# Patient Record
Sex: FEMALE | Race: WHITE | Employment: FULL TIME | URBAN - METROPOLITAN AREA
[De-identification: names, ages, dates, MRNs, and addresses within clinical notes are randomized per-mention and may not be internally consistent; named-entity substitution may affect disease eponyms.]

---

## 2017-01-12 ENCOUNTER — TELEPHONE (OUTPATIENT)
Dept: INTERNAL MEDICINE CLINIC | Facility: CLINIC | Age: 32
End: 2017-01-12

## 2017-01-12 NOTE — TELEPHONE ENCOUNTER
Patient is to take contrave and belviq both. Pharmacy informed. PLAN:  1. Obesity, morbid, BMI 50 or higher (HCC)/KYLE/prediabetes, pt with 4 lbs wt loss on 1 month of phentermine 37.5mg, contrave, topamax and metformin. Total wt loss of 17 lbs.  Pt with O

## 2017-02-17 ENCOUNTER — OFFICE VISIT (OUTPATIENT)
Dept: FAMILY MEDICINE CLINIC | Facility: CLINIC | Age: 32
End: 2017-02-17

## 2017-02-17 VITALS
SYSTOLIC BLOOD PRESSURE: 128 MMHG | DIASTOLIC BLOOD PRESSURE: 70 MMHG | HEIGHT: 64 IN | RESPIRATION RATE: 20 BRPM | TEMPERATURE: 98 F | OXYGEN SATURATION: 97 % | WEIGHT: 290 LBS | BODY MASS INDEX: 49.51 KG/M2 | HEART RATE: 81 BPM

## 2017-02-17 DIAGNOSIS — J01.00 ACUTE MAXILLARY SINUSITIS, RECURRENCE NOT SPECIFIED: ICD-10-CM

## 2017-02-17 DIAGNOSIS — J02.9 SORE THROAT: Primary | ICD-10-CM

## 2017-02-17 LAB — CONTROL LINE PRESENT WITH A CLEAR BACKGROUND (YES/NO): YES YES/NO

## 2017-02-17 PROCEDURE — 99213 OFFICE O/P EST LOW 20 MIN: CPT | Performed by: NURSE PRACTITIONER

## 2017-02-17 PROCEDURE — 87880 STREP A ASSAY W/OPTIC: CPT | Performed by: NURSE PRACTITIONER

## 2017-02-17 RX ORDER — AZITHROMYCIN 250 MG/1
TABLET, FILM COATED ORAL
Qty: 6 TABLET | Refills: 0 | Status: SHIPPED | OUTPATIENT
Start: 2017-02-17 | End: 2017-03-16

## 2017-02-17 NOTE — PROGRESS NOTES
CHIEF COMPLAINT:   Patient presents with:  Sinus Problem: sore throat, pain in left ear, congestion x 6 dys       HPI:   Maddison Thomas is a 32year old female who presents for cold symptoms for  1  weeks.  Symptoms have progressed into sinus dannielle Lorcaserin HCl (BELVIQ) 10 MG Oral Tab Take 10 mg by mouth 2 (two) times daily. Disp: 60 tablet Rfl: 0   Cholecalciferol (VITAMIN D) 2000 UNITS Oral Cap Take  by mouth.  Disp:  Rfl:       Past Medical History   Diagnosis Date   • Depression    • Unspecified EXTREMITIES: no cyanosis, clubbing or edema  LYMPH:  No cervical lymphadenopathy. ASSESSMENT AND PLAN:   Kerry Hinojosa is a 32year old female who presents with Sinus Problem.  Symptoms are consistent with:      ASSESSMENT:  Sore throat  ( Your doctor may prescribe medications to help treat your sinusitis. If you have an infection, antibiotics can help clear it up. If you are prescribed antibiotics, take all pills on schedule until they are gone, even if you feel better.  Decongestants help r

## 2017-03-16 ENCOUNTER — OFFICE VISIT (OUTPATIENT)
Dept: INTERNAL MEDICINE CLINIC | Facility: CLINIC | Age: 32
End: 2017-03-16

## 2017-03-16 VITALS
DIASTOLIC BLOOD PRESSURE: 78 MMHG | SYSTOLIC BLOOD PRESSURE: 124 MMHG | WEIGHT: 293 LBS | HEIGHT: 64 IN | HEART RATE: 98 BPM | BODY MASS INDEX: 50.02 KG/M2 | RESPIRATION RATE: 16 BRPM

## 2017-03-16 DIAGNOSIS — Z13.21 ENCOUNTER FOR VITAMIN DEFICIENCY SCREENING: Primary | ICD-10-CM

## 2017-03-16 DIAGNOSIS — F41.9 ANXIETY: ICD-10-CM

## 2017-03-16 DIAGNOSIS — E66.01 OBESITY, MORBID, BMI 50 OR HIGHER (HCC): ICD-10-CM

## 2017-03-16 PROCEDURE — 99213 OFFICE O/P EST LOW 20 MIN: CPT | Performed by: INTERNAL MEDICINE

## 2017-03-16 RX ORDER — PHENTERMINE HYDROCHLORIDE 37.5 MG/1
37.5 TABLET ORAL
Qty: 30 TABLET | Refills: 0 | Status: SHIPPED | OUTPATIENT
Start: 2017-03-16 | End: 2017-05-23

## 2017-03-16 NOTE — PROGRESS NOTES
CC: Patient presents with:  Weight Check: up 11 lb       HPI:   Obesity, worsening wt gain, stopped all meds. Worsening hunger. Increase anxiety and stress.        Current Outpatient Prescriptions:  Phentermine HCl 37.5 MG Oral Tab Take 1 tablet (37.5 for lipoid disorders     Essential hypertension, benign     Smoking     Lung nodule     Adrenal tumor     Encounter for therapeutic drug monitoring     Prediabetes     Fatigue     KYLE (obstructive sleep apnea)     Constipation     Anxiety        REVIEW OF

## 2017-04-11 ENCOUNTER — OFFICE VISIT (OUTPATIENT)
Dept: FAMILY MEDICINE CLINIC | Facility: CLINIC | Age: 32
End: 2017-04-11

## 2017-04-11 VITALS
HEART RATE: 91 BPM | RESPIRATION RATE: 16 BRPM | SYSTOLIC BLOOD PRESSURE: 122 MMHG | DIASTOLIC BLOOD PRESSURE: 82 MMHG | TEMPERATURE: 99 F | OXYGEN SATURATION: 99 % | HEIGHT: 64 IN | WEIGHT: 293 LBS | BODY MASS INDEX: 50.02 KG/M2

## 2017-04-11 DIAGNOSIS — M26.609 TMJ (TEMPOROMANDIBULAR JOINT SYNDROME): Primary | ICD-10-CM

## 2017-04-11 PROCEDURE — 99213 OFFICE O/P EST LOW 20 MIN: CPT | Performed by: PHYSICIAN ASSISTANT

## 2017-04-11 NOTE — PROGRESS NOTES
CHIEF COMPLAINT:   Patient presents with:  Jaw Pain: Jaw pain sx x 2 weeks. HPI:   Jorge Berry is a 32year old female who presents to clinic today with complaints of pain over the left TMJ radiating to the left ear.  Has had for 2  wee topiramate 50 MG Oral Tab Take 1 tablet (50 mg total) by mouth 2 (two) times daily. Disp: 60 tablet Rfl: 5   Hydrocortisone Acetate 25 MG Rectal Suppos Place 1 suppository (25 mg total) rectally 2 (two) times daily.  Disp: 24 suppository Rfl: 1   MetFORMIN THROAT: oral mucosa pink, moist. Posterior pharynx is not erythematous or injected. No exudates. NECK: supple, non-tender  LUNGS: clear to auscultation bilaterally, no wheezes or rhonchi. Breathing is non labored.   CARDIO: RRR without murmur  GI: good BS' Modest, nonsurgical treatments are a good first step toward relieving symptoms. Try the approaches described below. · Rest the jaw by avoiding crunchy or hard-to-chew foods. Do not eat hard or sticky candies. Soft foods and liquids are easier on the jaw. Unfortunately, many stressful situations cannot be avoided. So learning how to manage stress better is very important. Getting regular exercise, eating nutritious, balanced meals, and getting adequate rest all help to make everyday stress more manageable. Patient voiced understanding and is in agreement with treatment plan.

## 2017-04-11 NOTE — PATIENT INSTRUCTIONS
TMJ Syndrome  The temporomandibular joint (TMJ) is the joint that connects your lower jaw to your head. You can feel it in front of your ears when you open and close your mouth. TMJ disorders involve chronic or recurrent pain in the joint.  When treated, If stress seems to be contributing to your symptoms, try to identify the sources of stress in your life. These aren’t always obvious.  Common stressors include:  · Everyday hassles (which can add up), such as traffic jams, missed appointments, or car troubl · Trouble breathing or swallowing, wheezing  · Confusion  · Extreme drowsiness or trouble awakening  · Fainting or loss of consciousness  · Rapid heart rate  When to seek medical advice  Call your healthcare provider right away if any of these occur:  · Jonathan Lopez

## 2017-04-14 NOTE — TELEPHONE ENCOUNTER
Medication failed refill protocol. Last seen in the office on 8/16/16. Please approve or deny above Rx request. Thank you!

## 2017-04-17 RX ORDER — LOSARTAN POTASSIUM 25 MG/1
TABLET ORAL
Qty: 30 TABLET | Refills: 0 | Status: SHIPPED | OUTPATIENT
Start: 2017-04-17 | End: 2017-06-14

## 2017-04-26 ENCOUNTER — TELEPHONE (OUTPATIENT)
Dept: SURGERY | Facility: CLINIC | Age: 32
End: 2017-04-26

## 2017-04-26 NOTE — TELEPHONE ENCOUNTER
4/20/17 @ 2:17pm Spoke to Jethro at [facility], [phone]. AWI#[id number]. She verified that patient has following benefits for Bariatric services: No wgt mngmnt criteria. No TYLOR/Blue Distinction required.  ABBEY (XWN#[id number]) DX E66.01 Pt has benefits for Dieti

## 2017-05-02 ENCOUNTER — OFFICE VISIT (OUTPATIENT)
Dept: SURGERY | Facility: CLINIC | Age: 32
End: 2017-05-02

## 2017-05-02 ENCOUNTER — TELEPHONE (OUTPATIENT)
Dept: SURGERY | Facility: CLINIC | Age: 32
End: 2017-05-02

## 2017-05-02 VITALS
DIASTOLIC BLOOD PRESSURE: 108 MMHG | BODY MASS INDEX: 50.64 KG/M2 | RESPIRATION RATE: 18 BRPM | HEART RATE: 96 BPM | WEIGHT: 293 LBS | HEIGHT: 63.6 IN | SYSTOLIC BLOOD PRESSURE: 156 MMHG

## 2017-05-02 NOTE — H&P
[address] [place] [address]  Dept: [phone]    5/2/2017  Bariatric New Patient Evaluation    Chief Complaint:  obesity     History of Present Illness:  Jules Comas Comment: social    Drug Use: No            Other Topics            Concern  Caffeine Concern        No  Exercise                Yes    Comment:recently  Seat Belt               Yes        Medications:   Current outpatient prescriptions:   •  LOSARTAN P her breathing is unlabored she has no scleral icterus no cervical or supraclavicular lymphadenopathy no carotid bruits no thyromegaly her lungs are clear bilaterally her heart tones are normal there is perhaps a hint of a holosystolic murmur though initial

## 2017-05-15 ENCOUNTER — PRIOR ORIGINAL RECORDS (OUTPATIENT)
Dept: OTHER | Age: 32
End: 2017-05-15

## 2017-05-23 ENCOUNTER — OFFICE VISIT (OUTPATIENT)
Dept: INTERNAL MEDICINE CLINIC | Facility: CLINIC | Age: 32
End: 2017-05-23

## 2017-05-23 VITALS
SYSTOLIC BLOOD PRESSURE: 122 MMHG | DIASTOLIC BLOOD PRESSURE: 78 MMHG | HEIGHT: 64 IN | WEIGHT: 293 LBS | RESPIRATION RATE: 16 BRPM | HEART RATE: 96 BPM | BODY MASS INDEX: 50.02 KG/M2

## 2017-05-23 DIAGNOSIS — R73.03 PREDIABETES: ICD-10-CM

## 2017-05-23 DIAGNOSIS — Z51.81 ENCOUNTER FOR THERAPEUTIC DRUG MONITORING: Primary | ICD-10-CM

## 2017-05-23 DIAGNOSIS — E66.01 OBESITY, MORBID, BMI 50 OR HIGHER (HCC): ICD-10-CM

## 2017-05-23 PROCEDURE — 99213 OFFICE O/P EST LOW 20 MIN: CPT | Performed by: INTERNAL MEDICINE

## 2017-05-23 RX ORDER — PHENTERMINE HYDROCHLORIDE 37.5 MG/1
37.5 TABLET ORAL
Qty: 30 TABLET | Refills: 0 | Status: SHIPPED | OUTPATIENT
Start: 2017-05-23 | End: 2017-06-20

## 2017-05-23 NOTE — PROGRESS NOTES
CC: Patient presents with:  Weight Check: up 8 lbs       HPI:   Obesity, doing well on phentermine, worsening hunger, been getting some psych therapy. Current Outpatient Prescriptions:  FIBER OR Take by mouth. Disp:  Rfl:    Phentermine HCl 37. placed in this encounter.         Signed Prescriptions Disp Refills    Phentermine HCl 37.5 MG Oral Tab 30 tablet 0      Sig: Take 1 tablet (37.5 mg total) by mouth every morning before breakfast.      Naltrexone-Bupropion HCl ER (CONTRAVE) 8-90 MG Oral Tab

## 2017-06-06 ENCOUNTER — OFFICE VISIT (OUTPATIENT)
Dept: SURGERY | Facility: CLINIC | Age: 32
End: 2017-06-06

## 2017-06-06 VITALS
RESPIRATION RATE: 16 BRPM | HEIGHT: 63.6 IN | WEIGHT: 293 LBS | DIASTOLIC BLOOD PRESSURE: 74 MMHG | HEART RATE: 100 BPM | SYSTOLIC BLOOD PRESSURE: 122 MMHG | BODY MASS INDEX: 50.64 KG/M2

## 2017-06-06 NOTE — PROGRESS NOTES
[address], [place] [address] Lavonne Vaz [facility] [address]  Dept: [phone]    6/6/2017    BARIATRIC EXISTING PATIENT/FOLLOW UP    HPI:  Santa Clarke is a 32year old-year old fem

## 2017-06-07 ENCOUNTER — OFFICE VISIT (OUTPATIENT)
Dept: SURGERY | Facility: CLINIC | Age: 32
End: 2017-06-07

## 2017-06-07 VITALS — BODY MASS INDEX: 50.64 KG/M2 | HEIGHT: 63.6 IN | WEIGHT: 293 LBS

## 2017-06-07 DIAGNOSIS — E66.01 OBESITY, MORBID, BMI 50 OR HIGHER (HCC): Primary | ICD-10-CM

## 2017-06-07 PROCEDURE — 97802 MEDICAL NUTRITION INDIV IN: CPT | Performed by: DIETITIAN, REGISTERED

## 2017-06-07 NOTE — PROGRESS NOTES
[address] [place] [address] AND WEIGHT LOSS CLINIC  [address] [place] [address]  Dept: [phone]  Loc: [phone]    06/07/2017    Bariatric Initial Nutrition Assessment    Be Taco is a 32year old femal Topamax/Topriamate    Patient has received these behavioral treatments for weight loss: Psychologist Therapy    Patient is being followed by Dr. Dulce Quinn for medical weight loss.     Patient has completed medical weight management Yes    Patient has support f ALT 33 08/05/2016   BILT 0.4 08/05/2016   TP 7.3 08/05/2016   ALB 3.6 08/05/2016   GLOBULT 2.8 07/09/2015   ALBGLOBRAT 1.6 07/09/2015    08/05/2016   K 3.9 08/05/2016    08/05/2016   CO2 22.0 08/05/2016     No results found for: MG  No result date: 08/01/2016   Smokeless tobacco: Never Used   Comment: 5 cig daily       Alcohol Intake:    Alcohol Use: Yes   Comment: social       Drugs:    Drug Use: No       Estimated Kcal Needs (Bard Hint): 2783 kcal/day; 1783 kcal/day for weight loss  Es undesirable food choices and physical inactivity as evidenced by history    Intervention     1. Nutrition Rx:  Reviewed  1800 kcal meal plan, Discussed portion control, Reviewed Bariatric Diet Stages 1-4, Discussed goals and Obtain MVI  2.   Coordination o

## 2017-06-12 ENCOUNTER — HOSPITAL ENCOUNTER (OUTPATIENT)
Dept: CV DIAGNOSTICS | Facility: HOSPITAL | Age: 32
Discharge: HOME | End: 2017-06-12
Attending: INTERNAL MEDICINE

## 2017-06-12 ENCOUNTER — HOSPITAL ENCOUNTER (OUTPATIENT)
Dept: CV DIAGNOSTICS | Facility: HOSPITAL | Age: 32
Discharge: HOME | End: 2017-06-12
Attending: INTERNAL MEDICINE
Payer: COMMERCIAL

## 2017-06-12 ENCOUNTER — MYAURORA ACCOUNT LINK (OUTPATIENT)
Dept: OTHER | Age: 32
End: 2017-06-12

## 2017-06-12 DIAGNOSIS — R01.1 MURMUR: ICD-10-CM

## 2017-06-12 DIAGNOSIS — Z01.810 PREOP CARDIOVASCULAR EXAM: ICD-10-CM

## 2017-06-12 DIAGNOSIS — R06.00 DYSPNEA, UNSPECIFIED TYPE: ICD-10-CM

## 2017-06-12 PROCEDURE — 93018 CV STRESS TEST I&R ONLY: CPT | Performed by: INTERNAL MEDICINE

## 2017-06-12 PROCEDURE — 93350 STRESS TTE ONLY: CPT | Performed by: INTERNAL MEDICINE

## 2017-06-12 PROCEDURE — 93017 CV STRESS TEST TRACING ONLY: CPT | Performed by: INTERNAL MEDICINE

## 2017-06-15 ENCOUNTER — OFFICE VISIT (OUTPATIENT)
Dept: NUTRITION/OBESITY MEDICINE | Facility: HOSPITAL | Age: 32
End: 2017-06-15
Attending: SURGERY
Payer: COMMERCIAL

## 2017-06-15 DIAGNOSIS — E66.01 MORBID OBESITY DUE TO EXCESS CALORIES (HCC): Primary | ICD-10-CM

## 2017-06-15 PROCEDURE — 96101 PSYCL TSTG PR HR F2F TIME W/PT: CPT | Performed by: OTHER

## 2017-06-15 RX ORDER — LOSARTAN POTASSIUM 25 MG/1
TABLET ORAL
Qty: 30 TABLET | Refills: 0 | Status: SHIPPED | OUTPATIENT
Start: 2017-06-15 | End: 2017-07-10

## 2017-06-15 NOTE — TELEPHONE ENCOUNTER
LOV:08/04/16   LF:04/17/17    Pending Prescriptions Disp Refills    LOSARTAN POTASSIUM 25 MG Oral Tab [Pharmacy Med Name: LOSARTAN 25MG   TAB] 30 tablet 0     Sig: TAKE ONE TABLET BY MOUTH ONCE DAILY         Pt is in need of an apt   Please approve or deny

## 2017-06-15 NOTE — PROGRESS NOTES
Psychological Evaluation    Patient Name: Dalia Marie  Visit Date:  6/15/2017  :   1985    Reason for Referral:    Boogie Rollins is a 32year old single  female who was referred for a psychological evaluation prior to being sched include: Phentermine, Contrave, Losartan, and Cholecalciferol. Regarding a familial history of medical issues, Dharmesh Varela stated that her maternal grandmother was diagnosed with adult onset diabetes, as well as as sleep apnea.   Also, she related that her m into [employer], and following the diet plan provided to her. Also, she stated that since she used to eat fast food regularly, out of convenience, in order to avoid the temptation she takes different routes to work.  Roderick Simmons related that creating a st Inventory (RUBY). The RUBY was developed to address the need for an instrument that would reliably discriminate anxiety from depression while displaying convergent validity. The scale consists of 21 items, each describing a common symptom of anxiety.   The holding negative feelings concerning her physical stamina, muscular strength, biceps, and health.   On the Sexual Attractiveness factor, however, Ronnie Cameron endorsed holding mostly neutral, as well as positive feelings regarding the items comprising this fac Neighborhood and [employer] [facility]. Wilian Kline endorsed items that indicate she is satisfied with all of the aforementioned items, aside from her health, as it relates to her weight.     Clinical Impression:    Wilian Kline agreed to participate in the interview a recovery. Gideon Pedro appears competent, cognitively and emotionally, to make this decision and has started to make changes to her lifestyle in an effort to prepare for the surgery.   While she related some emotional eating, she stated that she has been wo

## 2017-06-20 ENCOUNTER — OFFICE VISIT (OUTPATIENT)
Dept: INTERNAL MEDICINE CLINIC | Facility: CLINIC | Age: 32
End: 2017-06-20

## 2017-06-20 VITALS
DIASTOLIC BLOOD PRESSURE: 80 MMHG | SYSTOLIC BLOOD PRESSURE: 122 MMHG | HEIGHT: 64 IN | RESPIRATION RATE: 16 BRPM | BODY MASS INDEX: 50.02 KG/M2 | HEART RATE: 84 BPM | WEIGHT: 293 LBS

## 2017-06-20 DIAGNOSIS — Z13.21 ENCOUNTER FOR VITAMIN DEFICIENCY SCREENING: Primary | ICD-10-CM

## 2017-06-20 DIAGNOSIS — F41.9 ANXIETY: ICD-10-CM

## 2017-06-20 DIAGNOSIS — E66.01 OBESITY, MORBID, BMI 50 OR HIGHER (HCC): ICD-10-CM

## 2017-06-20 DIAGNOSIS — R73.03 PREDIABETES: ICD-10-CM

## 2017-06-20 PROCEDURE — 99213 OFFICE O/P EST LOW 20 MIN: CPT | Performed by: INTERNAL MEDICINE

## 2017-06-20 RX ORDER — TOPIRAMATE 25 MG/1
25 TABLET ORAL 2 TIMES DAILY
Qty: 60 TABLET | Refills: 5 | Status: CANCELLED | OUTPATIENT
Start: 2017-06-20

## 2017-06-20 RX ORDER — LACTOBACIL 2/BIFIDO 1/S.THERMO 450B CELL
PACKET (EA) ORAL
COMMUNITY
End: 2017-08-17

## 2017-06-20 RX ORDER — PHENTERMINE HYDROCHLORIDE 37.5 MG/1
37.5 TABLET ORAL
Qty: 30 TABLET | Refills: 0 | Status: SHIPPED | OUTPATIENT
Start: 2017-06-20 | End: 2017-08-17

## 2017-06-20 NOTE — PROGRESS NOTES
CC: Patient presents with:  Weight Check: down 3 lb       HPI:   Obesity, doing well on phentermine and contrave. No chest pain.        Current Outpatient Prescriptions:  Phentermine HCl 37.5 MG Oral Tab Take 1 tablet (37.5 mg total) by mouth every mo Disp Refills    Phentermine HCl 37.5 MG Oral Tab 30 tablet 0      Sig: Take 1 tablet (37.5 mg total) by mouth every morning before breakfast.          None     ASSESSMENT:   Encounter for vitamin deficiency screening  (primary encounter diagnosis)  Obesity

## 2017-06-22 ENCOUNTER — PRIOR ORIGINAL RECORDS (OUTPATIENT)
Dept: OTHER | Age: 32
End: 2017-06-22

## 2017-06-28 ENCOUNTER — TELEPHONE (OUTPATIENT)
Dept: SURGERY | Facility: CLINIC | Age: 32
End: 2017-06-28

## 2017-07-03 ENCOUNTER — OFFICE VISIT (OUTPATIENT)
Dept: SURGERY | Facility: CLINIC | Age: 32
End: 2017-07-03

## 2017-07-03 VITALS — BODY MASS INDEX: 50.02 KG/M2 | WEIGHT: 293 LBS | HEIGHT: 64 IN

## 2017-07-03 DIAGNOSIS — E66.01 OBESITY, MORBID, BMI 50 OR HIGHER (HCC): Primary | ICD-10-CM

## 2017-07-03 PROCEDURE — 97803 MED NUTRITION INDIV SUBSEQ: CPT | Performed by: DIETITIAN, REGISTERED

## 2017-07-03 PROCEDURE — 0358T BIA WHOLE BODY: CPT | Performed by: DIETITIAN, REGISTERED

## 2017-07-03 NOTE — PROGRESS NOTES
[address] [place] [address] AND WEIGHT LOSS CLINIC  [address] [place] [address]  Dept: [phone]  Loc: [phone]    07/03/17      Bariatric Follow-up Nutrition Session    Irma Castro is a 32year old female VITD 34.5 08/05/2016   VITD25 27 (L) 07/09/2015     No results found for: THIAMINE   No results found for: VITB1    Meds:     Current Outpatient Prescriptions:   •  Phentermine HCl 37.5 MG Oral Tab, Take 1 tablet (37.5 mg total) by mouth every morning be 1800 kcal/day. Cooking majority of foods at home, less lean cuisine/frozen dinners, and prioritizing protein.    Food intolerances:  None reported  Vitamin/mineral supplements:  Adult MVI, Other: OTC fiber pills and Vit D  Protein supplements:  Other SlimFa Other:  RTC for Liquid Protein diet instruction  Additional RD visits required to review concepts? For Liquid Protein diet instruction  Patient understands protein requirements? Yes  Patient understand fluid requirements (amount and method of intake)?  Yes

## 2017-07-10 RX ORDER — LOSARTAN POTASSIUM 25 MG/1
TABLET ORAL
Qty: 30 TABLET | Refills: 0 | Status: SHIPPED | OUTPATIENT
Start: 2017-07-10 | End: 2017-07-19

## 2017-07-18 ENCOUNTER — TELEPHONE (OUTPATIENT)
Dept: SURGERY | Facility: CLINIC | Age: 32
End: 2017-07-18

## 2017-07-18 ENCOUNTER — OFFICE VISIT (OUTPATIENT)
Dept: SURGERY | Facility: CLINIC | Age: 32
End: 2017-07-18

## 2017-07-18 VITALS
DIASTOLIC BLOOD PRESSURE: 104 MMHG | WEIGHT: 293 LBS | BODY MASS INDEX: 50.02 KG/M2 | HEART RATE: 112 BPM | RESPIRATION RATE: 16 BRPM | SYSTOLIC BLOOD PRESSURE: 163 MMHG | HEIGHT: 64 IN

## 2017-07-18 NOTE — PROGRESS NOTES
[address], [place] [address] Lavonne Loza  [address]  Dept: [phone]    7/18/2017    BARIATRIC EXISTING PATIENT/FOLLOW UP    HPI:  Venancio Hi is a 32year [de-identified] old fe outcome. Plan: The patient understands all this wishes to proceed we will schedule her for gastric bypass on August 21. She will begin the liquid diet 2 weeks prior to and stop the phentermine and contrary for 2 weeks prior to surgery.   She will make a

## 2017-07-19 ENCOUNTER — OFFICE VISIT (OUTPATIENT)
Dept: FAMILY MEDICINE CLINIC | Facility: CLINIC | Age: 32
End: 2017-07-19

## 2017-07-19 VITALS
RESPIRATION RATE: 16 BRPM | OXYGEN SATURATION: 98 % | HEIGHT: 64 IN | HEART RATE: 123 BPM | SYSTOLIC BLOOD PRESSURE: 140 MMHG | BODY MASS INDEX: 50.02 KG/M2 | WEIGHT: 293 LBS | DIASTOLIC BLOOD PRESSURE: 102 MMHG

## 2017-07-19 DIAGNOSIS — Z13.21 SCREENING FOR ENDOCRINE, NUTRITIONAL, METABOLIC AND IMMUNITY DISORDER: ICD-10-CM

## 2017-07-19 DIAGNOSIS — Z13.29 SCREENING FOR ENDOCRINE, NUTRITIONAL, METABOLIC AND IMMUNITY DISORDER: ICD-10-CM

## 2017-07-19 DIAGNOSIS — L71.9 ROSACEA: ICD-10-CM

## 2017-07-19 DIAGNOSIS — Z13.0 SCREENING FOR ENDOCRINE, NUTRITIONAL, METABOLIC AND IMMUNITY DISORDER: ICD-10-CM

## 2017-07-19 DIAGNOSIS — Z13.228 SCREENING FOR ENDOCRINE, NUTRITIONAL, METABOLIC AND IMMUNITY DISORDER: ICD-10-CM

## 2017-07-19 DIAGNOSIS — Z30.41 ENCOUNTER FOR SURVEILLANCE OF CONTRACEPTIVE PILLS: ICD-10-CM

## 2017-07-19 DIAGNOSIS — I10 ESSENTIAL HYPERTENSION, BENIGN: Primary | ICD-10-CM

## 2017-07-19 PROCEDURE — 99214 OFFICE O/P EST MOD 30 MIN: CPT | Performed by: PHYSICIAN ASSISTANT

## 2017-07-19 RX ORDER — NORGESTIMATE AND ETHINYL ESTRADIOL 7DAYSX3 28
1 KIT ORAL DAILY
Qty: 3 PACKAGE | Refills: 3 | Status: SHIPPED | OUTPATIENT
Start: 2017-07-19 | End: 2017-09-01

## 2017-07-19 RX ORDER — LOSARTAN POTASSIUM 50 MG/1
50 TABLET ORAL DAILY
Qty: 90 TABLET | Refills: 3 | Status: SHIPPED | OUTPATIENT
Start: 2017-07-19 | End: 2018-08-10

## 2017-07-19 RX ORDER — METRONIDAZOLE 10 MG/G
1 GEL TOPICAL DAILY
Qty: 1 EACH | Refills: 0 | Status: SHIPPED | OUTPATIENT
Start: 2017-07-19 | End: 2018-01-30

## 2017-07-19 NOTE — PROGRESS NOTES
Santa Clarke is a 32year old female. HPI:   Patient presents for recheck of her hypertension. Pt has been taking medications as instructed, no medication side effects, home BP monitoring is around 120/80.   Pt denies chest pain, shortness o Packs/day: 0.00      Years: 0.00         Quit date: 8/1/2016  Smokeless tobacco: Never Used                      Comment: 5 cig daily  Alcohol use:  Yes              Comment: social        REVIEW OF SYSTEMS:   GENERAL HEALTH: feels well ot Prescriptions Disp Refills    TRI-SPRINTEC 0.18/0.215/0.25 MG-35 MCG Oral Tab 3 Package 3      Sig: Take 1 tablet by mouth daily. Losartan Potassium 50 MG Oral Tab 90 tablet 3      Sig: Take 1 tablet (50 mg total) by mouth daily.       MetroNIDAZOLE 1

## 2017-07-20 ENCOUNTER — PRIOR ORIGINAL RECORDS (OUTPATIENT)
Dept: OTHER | Age: 32
End: 2017-07-20

## 2017-07-21 ENCOUNTER — OFFICE VISIT (OUTPATIENT)
Dept: SURGERY | Facility: CLINIC | Age: 32
End: 2017-07-21

## 2017-07-21 ENCOUNTER — LAB ENCOUNTER (OUTPATIENT)
Dept: LAB | Facility: HOSPITAL | Age: 32
End: 2017-07-21
Attending: PHYSICIAN ASSISTANT
Payer: COMMERCIAL

## 2017-07-21 VITALS — HEIGHT: 64 IN | WEIGHT: 293 LBS | BODY MASS INDEX: 50.02 KG/M2

## 2017-07-21 DIAGNOSIS — Z13.29 SCREENING FOR ENDOCRINE, NUTRITIONAL, METABOLIC AND IMMUNITY DISORDER: ICD-10-CM

## 2017-07-21 DIAGNOSIS — E66.01 OBESITY, MORBID, BMI 50 OR HIGHER (HCC): Primary | ICD-10-CM

## 2017-07-21 DIAGNOSIS — Z13.21 SCREENING FOR ENDOCRINE, NUTRITIONAL, METABOLIC AND IMMUNITY DISORDER: ICD-10-CM

## 2017-07-21 DIAGNOSIS — Z13.228 SCREENING FOR ENDOCRINE, NUTRITIONAL, METABOLIC AND IMMUNITY DISORDER: ICD-10-CM

## 2017-07-21 DIAGNOSIS — Z13.0 SCREENING FOR ENDOCRINE, NUTRITIONAL, METABOLIC AND IMMUNITY DISORDER: ICD-10-CM

## 2017-07-21 LAB
25(OH)D3 SERPL-MCNC: 47 NG/ML
ALBUMIN SERPL BCP-MCNC: 3.8 G/DL (ref 3.5–4.8)
ALBUMIN/GLOB SERPL: 1 {RATIO} (ref 1–2)
ALP SERPL-CCNC: 59 U/L (ref 32–100)
ALT SERPL-CCNC: 39 U/L (ref 14–54)
ANION GAP SERPL CALC-SCNC: 9 MMOL/L (ref 0–18)
AST SERPL-CCNC: 43 U/L (ref 15–41)
BASOPHILS # BLD: 0.1 K/UL (ref 0–0.2)
BASOPHILS NFR BLD: 1 %
BILIRUB SERPL-MCNC: 0.9 MG/DL (ref 0.3–1.2)
BUN SERPL-MCNC: 16 MG/DL (ref 8–20)
BUN/CREAT SERPL: 23.9 (ref 10–20)
CALCIUM SERPL-MCNC: 9.4 MG/DL (ref 8.5–10.5)
CHLORIDE SERPL-SCNC: 102 MMOL/L (ref 95–110)
CHOLEST SERPL-MCNC: 151 MG/DL (ref 110–200)
CO2 SERPL-SCNC: 27 MMOL/L (ref 22–32)
CREAT SERPL-MCNC: 0.67 MG/DL (ref 0.5–1.5)
EOSINOPHIL # BLD: 0.2 K/UL (ref 0–0.7)
EOSINOPHIL NFR BLD: 3 %
ERYTHROCYTE [DISTWIDTH] IN BLOOD BY AUTOMATED COUNT: 14 % (ref 11–15)
FERRITIN SERPL IA-MCNC: 81 NG/ML (ref 11–307)
FOLATE SERPL-MCNC: 21.7 NG/ML
GLOBULIN PLAS-MCNC: 3.8 G/DL (ref 2.5–3.7)
GLUCOSE SERPL-MCNC: 107 MG/DL (ref 70–99)
HBA1C MFR BLD: 5.6 % (ref 4–6)
HCT VFR BLD AUTO: 42.3 % (ref 35–48)
HDLC SERPL-MCNC: 24 MG/DL
HGB BLD-MCNC: 14.4 G/DL (ref 12–16)
IRON SATN MFR SERPL: 35 % (ref 15–50)
IRON SERPL-MCNC: 146 MCG/DL (ref 28–170)
LDLC SERPL CALC-MCNC: 94 MG/DL (ref 0–99)
LYMPHOCYTES # BLD: 2.2 K/UL (ref 1–4)
LYMPHOCYTES NFR BLD: 23 %
MAGNESIUM SERPL-MCNC: 1.5 MG/DL (ref 1.8–2.5)
MCH RBC QN AUTO: 31.1 PG (ref 27–32)
MCHC RBC AUTO-ENTMCNC: 34 G/DL (ref 32–37)
MCV RBC AUTO: 91.5 FL (ref 80–100)
MONOCYTES # BLD: 0.7 K/UL (ref 0–1)
MONOCYTES NFR BLD: 8 %
NEUTROPHILS # BLD AUTO: 6.2 K/UL (ref 1.8–7.7)
NEUTROPHILS NFR BLD: 66 %
NONHDLC SERPL-MCNC: 127 MG/DL
OSMOLALITY UR CALC.SUM OF ELEC: 288 MOSM/KG (ref 275–295)
PHOSPHATE SERPL-MCNC: 4.1 MG/DL (ref 2.4–4.7)
PLATELET # BLD AUTO: 281 K/UL (ref 140–400)
PMV BLD AUTO: 8.6 FL (ref 7.4–10.3)
POTASSIUM SERPL-SCNC: 4.4 MMOL/L (ref 3.3–5.1)
PROT SERPL-MCNC: 7.6 G/DL (ref 5.9–8.4)
RBC # BLD AUTO: 4.63 M/UL (ref 3.7–5.4)
SODIUM SERPL-SCNC: 138 MMOL/L (ref 136–144)
TIBC SERPL-MCNC: 414 MCG/DL (ref 228–428)
TRANSFERRIN SERPL-MCNC: 314 MG/DL (ref 192–382)
TRIGL SERPL-MCNC: 164 MG/DL (ref 1–149)
TSH SERPL-ACNC: 0.47 UIU/ML (ref 0.45–5.33)
VIT B12 SERPL-MCNC: 372 PG/ML (ref 181–914)
WBC # BLD AUTO: 9.4 K/UL (ref 4–11)

## 2017-07-21 PROCEDURE — 82728 ASSAY OF FERRITIN: CPT

## 2017-07-21 PROCEDURE — 80053 COMPREHEN METABOLIC PANEL: CPT

## 2017-07-21 PROCEDURE — 84443 ASSAY THYROID STIM HORMONE: CPT

## 2017-07-21 PROCEDURE — 82306 VITAMIN D 25 HYDROXY: CPT

## 2017-07-21 PROCEDURE — 83540 ASSAY OF IRON: CPT

## 2017-07-21 PROCEDURE — 36415 COLL VENOUS BLD VENIPUNCTURE: CPT

## 2017-07-21 PROCEDURE — 82607 VITAMIN B-12: CPT

## 2017-07-21 PROCEDURE — 85025 COMPLETE CBC W/AUTO DIFF WBC: CPT

## 2017-07-21 PROCEDURE — 82746 ASSAY OF FOLIC ACID SERUM: CPT

## 2017-07-21 PROCEDURE — 84466 ASSAY OF TRANSFERRIN: CPT

## 2017-07-21 PROCEDURE — 83036 HEMOGLOBIN GLYCOSYLATED A1C: CPT

## 2017-07-21 PROCEDURE — 80061 LIPID PANEL: CPT

## 2017-07-21 PROCEDURE — 84100 ASSAY OF PHOSPHORUS: CPT

## 2017-07-21 PROCEDURE — 97803 MED NUTRITION INDIV SUBSEQ: CPT | Performed by: DIETITIAN, REGISTERED

## 2017-07-21 PROCEDURE — 83735 ASSAY OF MAGNESIUM: CPT

## 2017-07-21 NOTE — PROGRESS NOTES
[address] [place] [address] AND WEIGHT LOSS CLINIC  [address] [place] [address]  Dept: [phone]  Loc: [phone]    07/21/17    Bariatric Liquid Protein Nutrition Session    Maddison Thomas is a 32year old fem 08/05/2016    07/09/2015        Diabetes:    HGBA1C:    Lab Results  Component Value Date   A1C 5.7 (H) 06/05/2015       Lipid Panel:    Lab Results  Component Value Date   CHOLEST 151 07/21/2017   TRIG 164 (H) 07/21/2017   HDL 24 07/21/2017   LDL 9 in:  fruits and fiber  Patient has made some modifications and adjustments to diet: yes, continues to consistently track meals on MFP, started taking Bariatric MVI, 4-5 small meals throughout day, reduced calorie intake to ~1600/day, purchased bariatric co measurements, Food/fluid intake/choices, Food intolerances, Activity level, Vitamin/mineral supplementation, Reinforce goals, Calorie/protein intake and Other:  RTC 1 month post-op  Patient understands protein requirements?  Yes  Patient understand fluid re

## 2017-07-25 ENCOUNTER — TELEPHONE (OUTPATIENT)
Dept: FAMILY MEDICINE CLINIC | Facility: CLINIC | Age: 32
End: 2017-07-25

## 2017-07-25 DIAGNOSIS — R79.89 ELEVATED LIVER FUNCTION TESTS: Primary | ICD-10-CM

## 2017-07-25 NOTE — TELEPHONE ENCOUNTER
----- Message from Sheng Feliciano sent at 7/24/2017  9:50 AM CDT -----  Magnesium is low. Her bariatric surgeon ordered this. Please have the patient call them to see how they want to replace this before surgery. AST is a little elevated.  We can see

## 2017-07-25 NOTE — TELEPHONE ENCOUNTER
Called and spoke with pt. Pt informed of provider message below. Pt states understanding and agrees to plan.

## 2017-07-25 NOTE — TELEPHONE ENCOUNTER
I called pt at [phone] and verified 2 patient identifiers: name & . I discussed results and recommendations at length with patient. Labs faxed to bariatric surgeon Dr Carrillo Friends at [phone]. Pt informed to avoid etoh and tylenol.   I stated

## 2017-07-25 NOTE — TELEPHONE ENCOUNTER
Her level was barely elevated. I am not worried about this. She is fine to take tylenol after her surgery. We can hold off on repeating her lab until she is recovered from surgery. This is likely an incidental finding that will resolve on its own.

## 2017-07-27 ENCOUNTER — OFFICE VISIT (OUTPATIENT)
Dept: FAMILY MEDICINE CLINIC | Facility: CLINIC | Age: 32
End: 2017-07-27

## 2017-07-27 VITALS — DIASTOLIC BLOOD PRESSURE: 66 MMHG | SYSTOLIC BLOOD PRESSURE: 124 MMHG

## 2017-07-27 DIAGNOSIS — Z01.30 BLOOD PRESSURE CHECK: Primary | ICD-10-CM

## 2017-08-17 RX ORDER — MULTIVITAMIN
1 TABLET ORAL 2 TIMES DAILY
COMMUNITY
End: 2017-09-01 | Stop reason: ALTCHOICE

## 2017-08-19 ENCOUNTER — LAB ENCOUNTER (OUTPATIENT)
Dept: LAB | Facility: HOSPITAL | Age: 32
End: 2017-08-19
Attending: SURGERY
Payer: COMMERCIAL

## 2017-08-19 DIAGNOSIS — Z01.818 PRE-OP TESTING: ICD-10-CM

## 2017-08-19 LAB
ANTIBODY SCREEN: NEGATIVE
RH BLOOD TYPE: POSITIVE

## 2017-08-19 PROCEDURE — 86850 RBC ANTIBODY SCREEN: CPT

## 2017-08-19 PROCEDURE — 86901 BLOOD TYPING SEROLOGIC RH(D): CPT

## 2017-08-19 PROCEDURE — 86900 BLOOD TYPING SEROLOGIC ABO: CPT

## 2017-08-19 PROCEDURE — 36415 COLL VENOUS BLD VENIPUNCTURE: CPT

## 2017-08-21 ENCOUNTER — SURGERY (OUTPATIENT)
Age: 32
End: 2017-08-21

## 2017-08-21 ENCOUNTER — ANESTHESIA EVENT (OUTPATIENT)
Dept: SURGERY | Facility: HOSPITAL | Age: 32
DRG: 621 | End: 2017-08-21
Payer: COMMERCIAL

## 2017-08-21 ENCOUNTER — ANESTHESIA (OUTPATIENT)
Dept: SURGERY | Facility: HOSPITAL | Age: 32
DRG: 621 | End: 2017-08-21
Payer: COMMERCIAL

## 2017-08-21 ENCOUNTER — HOSPITAL ENCOUNTER (INPATIENT)
Facility: HOSPITAL | Age: 32
LOS: 2 days | Discharge: HOME | DRG: 621 | End: 2017-08-23
Attending: SURGERY | Admitting: SURGERY
Payer: COMMERCIAL

## 2017-08-21 DIAGNOSIS — Z01.818 PRE-OP TESTING: ICD-10-CM

## 2017-08-21 DIAGNOSIS — E66.01 MORBID OBESITY DUE TO EXCESS CALORIES (HCC): Primary | ICD-10-CM

## 2017-08-21 PROBLEM — E66.9 OBESITY: Status: ACTIVE | Noted: 2017-08-21

## 2017-08-21 LAB — B-HCG UR QL: NEGATIVE

## 2017-08-21 PROCEDURE — 0DJ08ZZ INSPECTION OF UPPER INTESTINAL TRACT, VIA NATURAL OR ARTIFICIAL OPENING ENDOSCOPIC: ICD-10-PCS | Performed by: SURGERY

## 2017-08-21 PROCEDURE — 99232 SBSQ HOSP IP/OBS MODERATE 35: CPT | Performed by: HOSPITALIST

## 2017-08-21 PROCEDURE — 0D164ZA BYPASS STOMACH TO JEJUNUM, PERCUTANEOUS ENDOSCOPIC APPROACH: ICD-10-PCS | Performed by: SURGERY

## 2017-08-21 RX ORDER — ROCURONIUM BROMIDE 10 MG/ML
INJECTION, SOLUTION INTRAVENOUS AS NEEDED
Status: DISCONTINUED | OUTPATIENT
Start: 2017-08-21 | End: 2017-08-21 | Stop reason: SURG

## 2017-08-21 RX ORDER — DEXAMETHASONE SODIUM PHOSPHATE 4 MG/ML
VIAL (ML) INJECTION AS NEEDED
Status: DISCONTINUED | OUTPATIENT
Start: 2017-08-21 | End: 2017-08-21 | Stop reason: SURG

## 2017-08-21 RX ORDER — CLINDAMYCIN PHOSPHATE 150 MG/ML
INJECTION, SOLUTION INTRAVENOUS AS NEEDED
Status: DISCONTINUED | OUTPATIENT
Start: 2017-08-21 | End: 2017-08-21 | Stop reason: SURG

## 2017-08-21 RX ORDER — GLYCOPYRROLATE 0.2 MG/ML
INJECTION INTRAMUSCULAR; INTRAVENOUS AS NEEDED
Status: DISCONTINUED | OUTPATIENT
Start: 2017-08-21 | End: 2017-08-21 | Stop reason: SURG

## 2017-08-21 RX ORDER — MORPHINE SULFATE 2 MG/ML
2 INJECTION, SOLUTION INTRAMUSCULAR; INTRAVENOUS EVERY 10 MIN PRN
Status: DISCONTINUED | OUTPATIENT
Start: 2017-08-21 | End: 2017-08-21 | Stop reason: HOSPADM

## 2017-08-21 RX ORDER — ONDANSETRON 2 MG/ML
INJECTION INTRAMUSCULAR; INTRAVENOUS AS NEEDED
Status: DISCONTINUED | OUTPATIENT
Start: 2017-08-21 | End: 2017-08-21 | Stop reason: SURG

## 2017-08-21 RX ORDER — DEXTROSE AND SODIUM CHLORIDE 5; .45 G/100ML; G/100ML
INJECTION, SOLUTION INTRAVENOUS CONTINUOUS
Status: DISCONTINUED | OUTPATIENT
Start: 2017-08-21 | End: 2017-08-23

## 2017-08-21 RX ORDER — HYDROCODONE BITARTRATE AND ACETAMINOPHEN 5; 325 MG/1; MG/1
1 TABLET ORAL AS NEEDED
Status: DISCONTINUED | OUTPATIENT
Start: 2017-08-21 | End: 2017-08-21 | Stop reason: HOSPADM

## 2017-08-21 RX ORDER — NALOXONE HYDROCHLORIDE 0.4 MG/ML
80 INJECTION, SOLUTION INTRAMUSCULAR; INTRAVENOUS; SUBCUTANEOUS AS NEEDED
Status: DISCONTINUED | OUTPATIENT
Start: 2017-08-21 | End: 2017-08-21 | Stop reason: HOSPADM

## 2017-08-21 RX ORDER — LOSARTAN POTASSIUM 50 MG/1
50 TABLET ORAL DAILY
Status: DISCONTINUED | OUTPATIENT
Start: 2017-08-22 | End: 2017-08-23

## 2017-08-21 RX ORDER — ONDANSETRON 2 MG/ML
4 INJECTION INTRAMUSCULAR; INTRAVENOUS ONCE AS NEEDED
Status: DISCONTINUED | OUTPATIENT
Start: 2017-08-21 | End: 2017-08-21 | Stop reason: HOSPADM

## 2017-08-21 RX ORDER — MORPHINE SULFATE 4 MG/ML
6 INJECTION, SOLUTION INTRAMUSCULAR; INTRAVENOUS EVERY 10 MIN PRN
Status: DISCONTINUED | OUTPATIENT
Start: 2017-08-21 | End: 2017-08-21 | Stop reason: HOSPADM

## 2017-08-21 RX ORDER — MORPHINE SULFATE 4 MG/ML
4 INJECTION, SOLUTION INTRAMUSCULAR; INTRAVENOUS EVERY 10 MIN PRN
Status: DISCONTINUED | OUTPATIENT
Start: 2017-08-21 | End: 2017-08-21 | Stop reason: HOSPADM

## 2017-08-21 RX ORDER — LIDOCAINE HYDROCHLORIDE 10 MG/ML
INJECTION, SOLUTION EPIDURAL; INFILTRATION; INTRACAUDAL; PERINEURAL AS NEEDED
Status: DISCONTINUED | OUTPATIENT
Start: 2017-08-21 | End: 2017-08-21 | Stop reason: SURG

## 2017-08-21 RX ORDER — BUPIVACAINE HYDROCHLORIDE 2.5 MG/ML
INJECTION, SOLUTION EPIDURAL; INFILTRATION; INTRACAUDAL AS NEEDED
Status: DISCONTINUED | OUTPATIENT
Start: 2017-08-21 | End: 2017-08-21 | Stop reason: HOSPADM

## 2017-08-21 RX ORDER — ACETAMINOPHEN 10 MG/ML
1000 INJECTION, SOLUTION INTRAVENOUS EVERY 6 HOURS PRN
Status: DISCONTINUED | OUTPATIENT
Start: 2017-08-21 | End: 2017-08-23

## 2017-08-21 RX ORDER — MORPHINE SULFATE 2 MG/ML
1 INJECTION, SOLUTION INTRAMUSCULAR; INTRAVENOUS EVERY 2 HOUR PRN
Status: DISCONTINUED | OUTPATIENT
Start: 2017-08-21 | End: 2017-08-23

## 2017-08-21 RX ORDER — METOCLOPRAMIDE 10 MG/1
10 TABLET ORAL ONCE
Status: COMPLETED | OUTPATIENT
Start: 2017-08-21 | End: 2017-08-21

## 2017-08-21 RX ORDER — HYDROMORPHONE HYDROCHLORIDE 1 MG/ML
0.4 INJECTION, SOLUTION INTRAMUSCULAR; INTRAVENOUS; SUBCUTANEOUS EVERY 5 MIN PRN
Status: DISCONTINUED | OUTPATIENT
Start: 2017-08-21 | End: 2017-08-21 | Stop reason: HOSPADM

## 2017-08-21 RX ORDER — SODIUM CHLORIDE 0.9 % (FLUSH) 0.9 %
10 SYRINGE (ML) INJECTION AS NEEDED
Status: DISCONTINUED | OUTPATIENT
Start: 2017-08-21 | End: 2017-08-23

## 2017-08-21 RX ORDER — SUCCINYLCHOLINE CHLORIDE 20 MG/ML
INJECTION INTRAMUSCULAR; INTRAVENOUS AS NEEDED
Status: DISCONTINUED | OUTPATIENT
Start: 2017-08-21 | End: 2017-08-21 | Stop reason: SURG

## 2017-08-21 RX ORDER — ONDANSETRON 2 MG/ML
4 INJECTION INTRAMUSCULAR; INTRAVENOUS EVERY 6 HOURS PRN
Status: DISCONTINUED | OUTPATIENT
Start: 2017-08-21 | End: 2017-08-23

## 2017-08-21 RX ORDER — KETOROLAC TROMETHAMINE 30 MG/ML
30 INJECTION, SOLUTION INTRAMUSCULAR; INTRAVENOUS EVERY 6 HOURS
Status: DISPENSED | OUTPATIENT
Start: 2017-08-21 | End: 2017-08-23

## 2017-08-21 RX ORDER — MORPHINE SULFATE 4 MG/ML
4 INJECTION, SOLUTION INTRAMUSCULAR; INTRAVENOUS EVERY 2 HOUR PRN
Status: DISCONTINUED | OUTPATIENT
Start: 2017-08-21 | End: 2017-08-23

## 2017-08-21 RX ORDER — SODIUM CHLORIDE, SODIUM LACTATE, POTASSIUM CHLORIDE, CALCIUM CHLORIDE 600; 310; 30; 20 MG/100ML; MG/100ML; MG/100ML; MG/100ML
INJECTION, SOLUTION INTRAVENOUS CONTINUOUS
Status: DISCONTINUED | OUTPATIENT
Start: 2017-08-21 | End: 2017-08-23

## 2017-08-21 RX ORDER — HYDROMORPHONE HYDROCHLORIDE 1 MG/ML
0.2 INJECTION, SOLUTION INTRAMUSCULAR; INTRAVENOUS; SUBCUTANEOUS EVERY 5 MIN PRN
Status: DISCONTINUED | OUTPATIENT
Start: 2017-08-21 | End: 2017-08-21 | Stop reason: HOSPADM

## 2017-08-21 RX ORDER — HYDROMORPHONE HYDROCHLORIDE 1 MG/ML
0.6 INJECTION, SOLUTION INTRAMUSCULAR; INTRAVENOUS; SUBCUTANEOUS EVERY 5 MIN PRN
Status: DISCONTINUED | OUTPATIENT
Start: 2017-08-21 | End: 2017-08-21 | Stop reason: HOSPADM

## 2017-08-21 RX ORDER — HEPARIN SODIUM 5000 [USP'U]/ML
5000 INJECTION, SOLUTION INTRAVENOUS; SUBCUTANEOUS ONCE
Status: COMPLETED | OUTPATIENT
Start: 2017-08-21 | End: 2017-08-21

## 2017-08-21 RX ORDER — ACETAMINOPHEN 325 MG/1
650 TABLET ORAL ONCE
Status: COMPLETED | OUTPATIENT
Start: 2017-08-21 | End: 2017-08-21

## 2017-08-21 RX ORDER — NEOSTIGMINE METHYLSULFATE 0.5 MG/ML
INJECTION INTRAVENOUS AS NEEDED
Status: DISCONTINUED | OUTPATIENT
Start: 2017-08-21 | End: 2017-08-21 | Stop reason: SURG

## 2017-08-21 RX ORDER — MORPHINE SULFATE 2 MG/ML
2 INJECTION, SOLUTION INTRAMUSCULAR; INTRAVENOUS EVERY 2 HOUR PRN
Status: DISCONTINUED | OUTPATIENT
Start: 2017-08-21 | End: 2017-08-23

## 2017-08-21 RX ORDER — FAMOTIDINE 20 MG/1
20 TABLET ORAL ONCE
Status: COMPLETED | OUTPATIENT
Start: 2017-08-21 | End: 2017-08-21

## 2017-08-21 RX ORDER — CLINDAMYCIN PHOSPHATE 900 MG/50ML
900 INJECTION INTRAVENOUS ONCE
Status: DISCONTINUED | OUTPATIENT
Start: 2017-08-21 | End: 2017-08-21 | Stop reason: HOSPADM

## 2017-08-21 RX ORDER — KETOROLAC TROMETHAMINE 15 MG/ML
15 INJECTION, SOLUTION INTRAMUSCULAR; INTRAVENOUS EVERY 6 HOURS
Status: ACTIVE | OUTPATIENT
Start: 2017-08-21 | End: 2017-08-23

## 2017-08-21 RX ORDER — HYDROCODONE BITARTRATE AND ACETAMINOPHEN 5; 325 MG/1; MG/1
2 TABLET ORAL AS NEEDED
Status: DISCONTINUED | OUTPATIENT
Start: 2017-08-21 | End: 2017-08-21 | Stop reason: HOSPADM

## 2017-08-21 RX ADMIN — DEXAMETHASONE SODIUM PHOSPHATE 4 MG: 4 MG/ML VIAL (ML) INJECTION at 14:28:00

## 2017-08-21 RX ADMIN — GLYCOPYRROLATE 0.8 MG: 0.2 INJECTION INTRAMUSCULAR; INTRAVENOUS at 16:05:00

## 2017-08-21 RX ADMIN — SUCCINYLCHOLINE CHLORIDE 180 MG: 20 INJECTION INTRAMUSCULAR; INTRAVENOUS at 14:31:00

## 2017-08-21 RX ADMIN — ROCURONIUM BROMIDE 20 MG: 10 INJECTION, SOLUTION INTRAVENOUS at 15:40:00

## 2017-08-21 RX ADMIN — CLINDAMYCIN PHOSPHATE 900 MG: 150 INJECTION, SOLUTION INTRAVENOUS at 14:31:00

## 2017-08-21 RX ADMIN — GLYCOPYRROLATE 0.2 MG: 0.2 INJECTION INTRAMUSCULAR; INTRAVENOUS at 14:28:00

## 2017-08-21 RX ADMIN — ROCURONIUM BROMIDE 10 MG: 10 INJECTION, SOLUTION INTRAVENOUS at 14:31:00

## 2017-08-21 RX ADMIN — ONDANSETRON 4 MG: 2 INJECTION INTRAMUSCULAR; INTRAVENOUS at 14:28:00

## 2017-08-21 RX ADMIN — SODIUM CHLORIDE, SODIUM LACTATE, POTASSIUM CHLORIDE, CALCIUM CHLORIDE: 600; 310; 30; 20 INJECTION, SOLUTION INTRAVENOUS at 16:25:00

## 2017-08-21 RX ADMIN — ROCURONIUM BROMIDE 20 MG: 10 INJECTION, SOLUTION INTRAVENOUS at 15:25:00

## 2017-08-21 RX ADMIN — LIDOCAINE HYDROCHLORIDE 50 MG: 10 INJECTION, SOLUTION EPIDURAL; INFILTRATION; INTRACAUDAL; PERINEURAL at 14:31:00

## 2017-08-21 RX ADMIN — ROCURONIUM BROMIDE 30 MG: 10 INJECTION, SOLUTION INTRAVENOUS at 14:40:00

## 2017-08-21 RX ADMIN — SODIUM CHLORIDE, SODIUM LACTATE, POTASSIUM CHLORIDE, CALCIUM CHLORIDE: 600; 310; 30; 20 INJECTION, SOLUTION INTRAVENOUS at 14:28:00

## 2017-08-21 RX ADMIN — NEOSTIGMINE METHYLSULFATE 5 MG: 0.5 INJECTION INTRAVENOUS at 16:05:00

## 2017-08-21 NOTE — H&P
[address] Patient Status:  Surgery Admit    1985 MRN [id number]   Location [facility] PRE OP RECOVERY Attending Serina Rhoades MD   Hosp Day # 0 PCP Maria Elena Hale MD MCG Oral Tab Take 1 tablet by mouth daily. (Patient taking differently: Take 1 tablet by mouth nightly.  ) Disp: 3 Package Rfl: 3 8/21/2017 at 0800   Losartan Potassium 50 MG Oral Tab Take 1 tablet (50 mg total) by mouth daily.  Disp: 90 tablet Rfl: 3 8/21/ (H) 07/21/2017   CA 9.4 07/21/2017   ALB 3.8 07/21/2017   ALKPHO 59 07/21/2017   BILT 0.9 07/21/2017   TP 7.6 07/21/2017   AST 43 (H) 07/21/2017   ALT 39 07/21/2017   TSH 0.47 07/21/2017   DDIMER 0.50 (H) 04/22/2015   MG 1.5 (L) 07/21/2017   PHOS 4.1 07/21

## 2017-08-21 NOTE — ANESTHESIA PREPROCEDURE EVALUATION
Anesthesia PreOp Note    HPI:     Maddison Thomas is a 32year old female who presents for preoperative consultation requested by: Alfonso Corona MD    Date of Surgery: 8/21/2017    Procedure(s):  BARIATRIC GASTRIC BYPASS LAPAROSCOPIC MOE-EN-Y Application topically daily.  Disp: 1 each Rfl: 0 Past Week at Unknown time       Current Facility-Administered Medications Ordered in Epic:  lactated ringers infusion  Intravenous Continuous Jay Orta MD Last Rate: 20 mL/hr at 08/21/17 1032   clind and her pulse is 106. Her respiration is 16 and oxygen saturation is 100%.     08/17/17  1512 08/21/17  1025   BP:  149/55   BP Location:  Right arm   Pulse:  106   Resp:  16   Temp:  98.6 °F (37 °C)   TempSrc:  Oral   SpO2:  100%   Weight: (!) 142.9 kg (31

## 2017-08-21 NOTE — PLAN OF CARE
Problem: GASTROINTESTINAL - ADULT  Goal: Minimal or absence of nausea and vomiting  INTERVENTIONS:  - Maintain adequate hydration with IV or PO as ordered and tolerated  - Nasogastric tube to low intermittent suction as ordered  - Evaluate effectiveness of pressure ulcer development  - Assess and document skin integrity  - Monitor for areas of redness and/or skin breakdown  - Initiate interventions, skin care algorithm/standards of care as needed  Outcome: Progressing  Skin wnl  Goal: Incision(s), wounds(s) injury  INTERVENTIONS:  - Assess pt frequently for physical needs  - Identify cognitive and physical deficits and behaviors that affect risk of falls.   - [place] fall precautions as indicated by assessment.  - Educate pt/family on patient safety includin

## 2017-08-21 NOTE — PROGRESS NOTES
[facility]    Progress Note    Venancio Miines Patient Status:  Surgery Admit    1985 MRN [id number]   Location [address] UNIT Attending Sandford Duane, MD   Hosp Day # 0 PCP Sherri Fitch hypertension  CONT HOME MEDS, MONITOR. KYLE (obstructive sleep apnea)  KYLE PROTOCOL, CONT CPAP.          Results:     Lab Results  Component Value Date   WBC 9.4 07/21/2017   HGB 14.4 07/21/2017   HCT 42.3 07/21/2017    07/21/2017   CREATSERUM 0

## 2017-08-21 NOTE — BRIEF OP NOTE
Pre-Operative Diagnosis: morbid obesity, hypertension, obstructive sleep apnea     Post-Operative Diagnosis: morbid obesity, hypertension, obstructive sleep apnea     Procedure Performed:   Procedure(s):  laparoscopic kathie-en-y gastric bypass    Surgeon

## 2017-08-21 NOTE — OPERATIVE REPORT
Iban Olivarez / Yanci Mcfarlane / Tammy Richmond / Marline Bocanegra / Nayely Rader / Noemy  - [phone]  Answering Service [phone]        Preop Diagnosis: Morbid Obesity secondary to excess calories   Postop Diagnosis: Morbid trocar, and a 5 mm right upper quadrant trocar were all inserted under direct vision. We started by elevating the omentum up towards the head, allowing us to elevate the transverse mesocolon. The ligament of Treitz was identified.   It was carried out t lateral trocar site. The stitch was cut leaving only the anvil and the gastric pouch in good position. We then identified our Ayaka limb which reached up to this area without any tension. A 2-0 silk antimesenteric stay suture was placed.   Enterotomy w and a running 4-0 Monocryl subarticular. The remaining trocar sites were closed with 4-0 Monocryl subarticular sutures. Patient tolerated the procedure well and was brought to the postanesthesia recovery unit in stable condition. Henok Pretty

## 2017-08-22 LAB
ANION GAP SERPL CALC-SCNC: 5 MMOL/L (ref 0–18)
BASOPHILS # BLD: 0 K/UL (ref 0–0.2)
BASOPHILS NFR BLD: 0 %
BUN SERPL-MCNC: 7 MG/DL (ref 8–20)
BUN/CREAT SERPL: 10.4 (ref 10–20)
CALCIUM SERPL-MCNC: 8 MG/DL (ref 8.5–10.5)
CHLORIDE SERPL-SCNC: 106 MMOL/L (ref 95–110)
CO2 SERPL-SCNC: 26 MMOL/L (ref 22–32)
CREAT SERPL-MCNC: 0.67 MG/DL (ref 0.5–1.5)
EOSINOPHIL # BLD: 0 K/UL (ref 0–0.7)
EOSINOPHIL NFR BLD: 0 %
ERYTHROCYTE [DISTWIDTH] IN BLOOD BY AUTOMATED COUNT: 13.8 % (ref 11–15)
GLUCOSE SERPL-MCNC: 187 MG/DL (ref 70–99)
HCT VFR BLD AUTO: 35.7 % (ref 35–48)
HGB BLD-MCNC: 12 G/DL (ref 12–16)
LYMPHOCYTES # BLD: 1.2 K/UL (ref 1–4)
LYMPHOCYTES NFR BLD: 10 %
MCH RBC QN AUTO: 30.8 PG (ref 27–32)
MCHC RBC AUTO-ENTMCNC: 33.6 G/DL (ref 32–37)
MCV RBC AUTO: 91.6 FL (ref 80–100)
MONOCYTES # BLD: 0.9 K/UL (ref 0–1)
MONOCYTES NFR BLD: 7 %
NEUTROPHILS # BLD AUTO: 9.9 K/UL (ref 1.8–7.7)
NEUTROPHILS NFR BLD: 83 %
OSMOLALITY UR CALC.SUM OF ELEC: 287 MOSM/KG (ref 275–295)
PLATELET # BLD AUTO: 245 K/UL (ref 140–400)
PMV BLD AUTO: 9.2 FL (ref 7.4–10.3)
POTASSIUM SERPL-SCNC: 3.7 MMOL/L (ref 3.3–5.1)
RBC # BLD AUTO: 3.9 M/UL (ref 3.7–5.4)
SODIUM SERPL-SCNC: 137 MMOL/L (ref 136–144)
WBC # BLD AUTO: 12 K/UL (ref 4–11)

## 2017-08-22 PROCEDURE — 99232 SBSQ HOSP IP/OBS MODERATE 35: CPT | Performed by: HOSPITALIST

## 2017-08-22 RX ORDER — HEPARIN SODIUM 5000 [USP'U]/ML
5000 INJECTION, SOLUTION INTRAVENOUS; SUBCUTANEOUS EVERY 8 HOURS SCHEDULED
Status: DISCONTINUED | OUTPATIENT
Start: 2017-08-22 | End: 2017-08-23

## 2017-08-22 NOTE — RESPIRATORY THERAPY NOTE
Talked to the family and the patient about the CPAP nd they said brought own CPAP and do not want to she evens Denis.

## 2017-08-22 NOTE — PAYOR COMM NOTE
REQUESTING 1 ADDITIONAL DAY    CONTINUED STAY REVIEW    Payor: [facility] PPO  Subscriber #:  [id number]  Authorization Number: [id number]    Admit date: 8/21/17  Admit time: 1734    Admitting Physician: Abeba Vázquez MD  Attending Physician:  Don Hicks Route User    8/21/2017 1709 Given 0.5 mg Intravenous Ira Rose RN    8/21/2017 1704 Given 0.5 mg Intravenous Ira Rose RN    8/21/2017 1649 Given 0.5 mg Intravenous Ira Rose RN    8/21/2017 1644 Given 0.5 mg Intravenous João Nichole % 1,000 mL irrigation (OR to mix)     Date Action Dose Route User    8/21/2017 1512 Given (none) Irrigation Pedro Blanco MD      propofol (DIPRIVAN) injection     Date Action Dose Route User    8/21/2017 1431 Given 200 mg Intravenous Fanta Ray     morphINE sulfate (PF) 2 MG/ML injection 2 mg 2 mg Intravenous Q2H PRN   Or         morphINE sulfate (PF) 4 MG/ML injection 4 mg 4 mg Intravenous Q2H PRN   ondansetron HCl (ZOFRAN) injection 4 mg 4 mg Intravenous Q6H PRN   acetaminophen (OFIRMEV) infusi 08/22/17   0418   WBC  12.0*   HGB  12.0   HCT  35.7   PLT  245   RBC  3.90   MCV  91.6   MCH  30.8   MCHC  33.6   RDW  13.8          Recent Labs   Lab  08/22/17 0418   BUN  7*   CREATSERUM  0.67   GFRAA  >60   GFRNAA  >60   CA  8.0*   NA  137   K  3.7

## 2017-08-22 NOTE — PROGRESS NOTES
Alok Combs / Francisco Galeas / Georgette Mcintosh / Clau Sebastian / Lindsey Barajas / Mayra Coughlin - [phone]  Answering Service [phone]      Progress Note    Kerry Hinojosa Patient Status:  Inpatient    1985 MR 50 mg 50 mg Oral Daily       Labs:    Lab Results  Component Value Date   WBC 12.0 (H) 08/22/2017   HGB 12.0 08/22/2017   HCT 35.7 08/22/2017    08/22/2017    08/22/2017   K 3.7 08/22/2017   CO2 26 08/22/2017   BUN 7 (L) 08/22/2017    (

## 2017-08-22 NOTE — PROGRESS NOTES
[facility]  Progress Note     Caitlyn Evans  : 1985    Status: Inpatient  Day #: 1    Attending: Nik Finley MD  PCP: Raymon Zuleta MD      Assessment and Plan     Morbid obesity Body mass index is 52.01 kg/m².   - s/p l HYDROcodone-acetaminophen, morphINE sulfate **OR** morphINE sulfate **OR** morphINE sulfate, ondansetron HCl, acetaminophen          Lizbeth Atkinson MD

## 2017-08-22 NOTE — BH NUTRITION NOTE
Bariatric Inpatient Nutrition Note      Hiren Leyva is a 32year old female.     Procedure: Laparoscopic gastric bypass surgery  Surgery Date: 8.21.2017  Medical Diagnosis: Morbid obesity    Height:  Ht Readings from Last 1 Encounters:  08/2 Subcutaneous Q8H [address]   lactated ringers infusion  Intravenous Continuous   [COMPLETED] acetaminophen (TYLENOL) tab 650 mg 650 mg Oral Once   [COMPLETED] famoTIDine (PEPCID) tab 20 mg 20 mg Oral Once   [COMPLETED] Metoclopramide HCl (REGLAN) tab 10 mg 10 mg additional supplementation for repletion as this only provides 100 mg.      Arnulfo Nickerson, MS, RD, LDN

## 2017-08-23 VITALS
TEMPERATURE: 98 F | WEIGHT: 293 LBS | SYSTOLIC BLOOD PRESSURE: 136 MMHG | HEART RATE: 119 BPM | BODY MASS INDEX: 50.02 KG/M2 | DIASTOLIC BLOOD PRESSURE: 78 MMHG | RESPIRATION RATE: 18 BRPM | HEIGHT: 64 IN | OXYGEN SATURATION: 94 %

## 2017-08-23 PROCEDURE — 99238 HOSP IP/OBS DSCHRG MGMT 30/<: CPT | Performed by: HOSPITALIST

## 2017-08-23 NOTE — DISCHARGE SUMMARY
[facility]    Discharge Summary    Santa Clarke Patient Status:  Inpatient    1985 MRN [id number]   Location [facility] 4W/SW/SE Attending Ashutosh Rodas MD   Hosp Day # 2 PCP Joni Austin MD     Date of Adm mondragon discontinued    DVT proph: heparin sq    Complications: none     Consultants     Provider Role Specialty    Jaylon Odom MD Consulting Physician  HOSPITALIST        Surgical Procedures     Case IDs Date Procedure Surgeon Location Status    [id number] Where to Get Your Medications      Please  your prescriptions at the location directed by your doctor or nurse    Bring a paper prescription for each of these medications  · HYDROcodone-acetaminophen 7.5-325 MG/15ML Soln         Follow up:      Mary Blount early may lead to a blockage and vomiting. It may also create stress on the newly formed stomach. · Drink liquids in smaller amounts than you used to. This will make it easier for your body to digest liquids.  But, it is important that you continue to drin pause every few steps. · Start an exercise program 1 to 2 weeks after discharge. You can benefit from simple activities, such as walking or gardening.  Ask your doctor how and when to get started, or speak to the Exercise Physiologist.  · Ask your doctor w pain in your calves  · Persistent pain, nausea, or left shoulder  · Persistent hiccups  · Confusion, depression, or unusual fatigue  · Signs of bladder infection (urinating more often than usual, burning, pain, bleeding, or hesitancy when you urinate)

## 2017-08-23 NOTE — DISCHARGE PLANNING
Discharge Instructions    Seen patient in hospital to discuss discharge care. Covered the postoperative gastric bypass discharge instructions. *Covered fluid intake of 64 oz/day and to call if not meeting at 30-40 oz/day.   *Monitor incision for any red

## 2017-08-23 NOTE — PROGRESS NOTES
Emma Kaplan / Homer Ansari / Jamaal Vicente / Joe Del Valle / Elijah Ma / Michelle Walker - [phone]  Answering Service [phone]      Progress Note    Hiren Leyva Patient Status:  Inpatient    1985 MR PRN   acetaminophen (OFIRMEV) infusion 1,000 mg 1,000 mg Intravenous Q6H PRN   Losartan Potassium (COZAAR) tab 50 mg 50 mg Oral Daily       Labs:    Lab Results  Component Value Date   WBC 12.0 (H) 08/22/2017   HGB 12.0 08/22/2017   HCT 35.7 08/22/2017   P

## 2017-08-23 NOTE — RESPIRATORY THERAPY NOTE
RESPIRATORY THERAPY CPAP PROGRESS NOTE:    Patient is compliant with nightly CPAP: Yes  Patient refused: No  AutoCPAP in use: No  [facility] EPAP noted: 7 cmH20  CPAP interface: Patient using own nasal pillows  Patient tolerating: Well    P recommends to gloria

## 2017-08-23 NOTE — PLAN OF CARE
Active Problems     Problem: Patient/Family Goals   Start Date: 08/21/17   Problem Details: Patient/Family Goals is for capturing the specific goals the patient wants to achieve.  These goals are used in conjunction with physiologic, psychosocial, and needs review patient's medication profile    Goal Priority Disciplines Start Date Expected End Date End Date   Maintains adequate nutritional intake (undernourished) -- Nurse, RT, Social Work, Interdisciplinary 08/21/17 -- --   Goal Details: INTERVENTIONS:   - M Implement wound care per orders   - Initiate  isolation precautions as appropriate   - Initiate Pressure Ulcer prevention bundle as indicated    Goal Priority Disciplines Start Date Expected End Date End Date   Oral mucous membranes remain intact -- Nurse, needs   - Identify cognitive and physical deficits and behaviors that affect risk of falls.    - [place] fall precautions as indicated by assessment.   - Educate pt/family on patient safety including phys ical limitations   - Instruct pt to call for zahida

## 2017-08-24 ENCOUNTER — PATIENT OUTREACH (OUTPATIENT)
Dept: CASE MANAGEMENT | Age: 32
End: 2017-08-24

## 2017-08-24 DIAGNOSIS — G47.33 OSA (OBSTRUCTIVE SLEEP APNEA): ICD-10-CM

## 2017-08-24 DIAGNOSIS — E66.01 OBESITY, MORBID, BMI 50 OR HIGHER (HCC): ICD-10-CM

## 2017-08-24 NOTE — PROGRESS NOTES
Initial Post Discharge Follow Up   Discharge Date: 8/23/17  Contact Date: 8/24/2017    Consent Verification:  Assessment Completed With: Patient  HIPAA Verified?   Yes    Discharge Dx:   Morbid obesity, HTN, KYLE; S/P gastric bypass surgery    General: Disp: 3 Package Rfl: 3   Losartan Potassium 50 MG Oral Tab Take 1 tablet (50 mg total) by mouth daily. Disp: 90 tablet Rfl: 3   MetroNIDAZOLE 1 % External Gel Apply 1 Application topically daily.  Disp: 1 each Rfl: 0     • When you were leaving the hospital [facility] ([facility])    Dec 05, 2017  3:15 PM CST Exam - Established Patient with Vasu Butler MD [address], [facility], [facility] ([facility] [address])        [address]

## 2017-08-28 ENCOUNTER — TELEPHONE (OUTPATIENT)
Dept: SURGERY | Facility: CLINIC | Age: 32
End: 2017-08-28

## 2017-08-28 ENCOUNTER — APPOINTMENT (OUTPATIENT)
Dept: CT IMAGING | Facility: HOSPITAL | Age: 32
End: 2017-08-28
Attending: EMERGENCY MEDICINE
Payer: COMMERCIAL

## 2017-08-28 ENCOUNTER — HOSPITAL ENCOUNTER (EMERGENCY)
Facility: HOSPITAL | Age: 32
Discharge: HOME | End: 2017-08-28
Attending: EMERGENCY MEDICINE
Payer: COMMERCIAL

## 2017-08-28 ENCOUNTER — APPOINTMENT (OUTPATIENT)
Dept: GENERAL RADIOLOGY | Facility: HOSPITAL | Age: 32
End: 2017-08-28
Attending: EMERGENCY MEDICINE
Payer: COMMERCIAL

## 2017-08-28 VITALS
OXYGEN SATURATION: 94 % | RESPIRATION RATE: 16 BRPM | WEIGHT: 293 LBS | HEIGHT: 64 IN | TEMPERATURE: 99 F | BODY MASS INDEX: 50.02 KG/M2 | DIASTOLIC BLOOD PRESSURE: 59 MMHG | HEART RATE: 74 BPM | SYSTOLIC BLOOD PRESSURE: 145 MMHG

## 2017-08-28 DIAGNOSIS — R10.9 ABDOMINAL PAIN, LEFT LATERAL: ICD-10-CM

## 2017-08-28 DIAGNOSIS — N30.00 ACUTE CYSTITIS WITHOUT HEMATURIA: Primary | ICD-10-CM

## 2017-08-28 LAB
ANION GAP SERPL CALC-SCNC: 13 MMOL/L (ref 0–18)
B-HCG UR QL: NEGATIVE
BASOPHILS # BLD: 0.1 K/UL (ref 0–0.2)
BASOPHILS NFR BLD: 1 %
BILIRUB UR QL: NEGATIVE
BUN SERPL-MCNC: 10 MG/DL (ref 8–20)
BUN/CREAT SERPL: 17.9 (ref 10–20)
CALCIUM SERPL-MCNC: 9.1 MG/DL (ref 8.5–10.5)
CHLORIDE SERPL-SCNC: 98 MMOL/L (ref 95–110)
CO2 SERPL-SCNC: 30 MMOL/L (ref 22–32)
COLOR UR: YELLOW
CREAT SERPL-MCNC: 0.56 MG/DL (ref 0.5–1.5)
EOSINOPHIL # BLD: 0.4 K/UL (ref 0–0.7)
EOSINOPHIL NFR BLD: 5 %
ERYTHROCYTE [DISTWIDTH] IN BLOOD BY AUTOMATED COUNT: 13.6 % (ref 11–15)
GLUCOSE SERPL-MCNC: 101 MG/DL (ref 70–99)
GLUCOSE UR-MCNC: NEGATIVE MG/DL
HCT VFR BLD AUTO: 36.8 % (ref 35–48)
HGB BLD-MCNC: 12.4 G/DL (ref 12–16)
LYMPHOCYTES # BLD: 1.4 K/UL (ref 1–4)
LYMPHOCYTES NFR BLD: 14 %
MCH RBC QN AUTO: 30.5 PG (ref 27–32)
MCHC RBC AUTO-ENTMCNC: 33.7 G/DL (ref 32–37)
MCV RBC AUTO: 90.7 FL (ref 80–100)
MONOCYTES # BLD: 0.9 K/UL (ref 0–1)
MONOCYTES NFR BLD: 9 %
NEUTROPHILS # BLD AUTO: 6.9 K/UL (ref 1.8–7.7)
NEUTROPHILS NFR BLD: 71 %
NITRITE UR QL STRIP.AUTO: POSITIVE
OSMOLALITY UR CALC.SUM OF ELEC: 291 MOSM/KG (ref 275–295)
PH UR: 6 [PH] (ref 5–8)
PLATELET # BLD AUTO: 338 K/UL (ref 140–400)
PMV BLD AUTO: 8.7 FL (ref 7.4–10.3)
POTASSIUM SERPL-SCNC: 3.1 MMOL/L (ref 3.3–5.1)
PROT UR-MCNC: 100 MG/DL
RBC # BLD AUTO: 4.06 M/UL (ref 3.7–5.4)
RBC #/AREA URNS AUTO: 57 /HPF
SODIUM SERPL-SCNC: 141 MMOL/L (ref 136–144)
SP GR UR STRIP: 1.01 (ref 1–1.03)
UROBILINOGEN UR STRIP-ACNC: <2
VIT C UR-MCNC: NEGATIVE MG/DL
WBC # BLD AUTO: 9.6 K/UL (ref 4–11)
WBC #/AREA URNS AUTO: 2467 /HPF

## 2017-08-28 PROCEDURE — 71020 XR CHEST PA + LAT CHEST (CPT=71020): CPT | Performed by: EMERGENCY MEDICINE

## 2017-08-28 PROCEDURE — 87186 SC STD MICRODIL/AGAR DIL: CPT | Performed by: EMERGENCY MEDICINE

## 2017-08-28 PROCEDURE — 87086 URINE CULTURE/COLONY COUNT: CPT | Performed by: EMERGENCY MEDICINE

## 2017-08-28 PROCEDURE — 74177 CT ABD & PELVIS W/CONTRAST: CPT | Performed by: EMERGENCY MEDICINE

## 2017-08-28 PROCEDURE — 80048 BASIC METABOLIC PNL TOTAL CA: CPT | Performed by: EMERGENCY MEDICINE

## 2017-08-28 PROCEDURE — 96375 TX/PRO/DX INJ NEW DRUG ADDON: CPT

## 2017-08-28 PROCEDURE — 81025 URINE PREGNANCY TEST: CPT

## 2017-08-28 PROCEDURE — 85025 COMPLETE CBC W/AUTO DIFF WBC: CPT | Performed by: EMERGENCY MEDICINE

## 2017-08-28 PROCEDURE — 87088 URINE BACTERIA CULTURE: CPT | Performed by: EMERGENCY MEDICINE

## 2017-08-28 PROCEDURE — 96365 THER/PROPH/DIAG IV INF INIT: CPT

## 2017-08-28 PROCEDURE — 99284 EMERGENCY DEPT VISIT MOD MDM: CPT

## 2017-08-28 PROCEDURE — 81001 URINALYSIS AUTO W/SCOPE: CPT | Performed by: EMERGENCY MEDICINE

## 2017-08-28 RX ORDER — CEPHALEXIN 250 MG/5ML
500 POWDER, FOR SUSPENSION ORAL 3 TIMES DAILY
Qty: 210 ML | Refills: 0 | Status: SHIPPED | OUTPATIENT
Start: 2017-08-28 | End: 2017-09-04

## 2017-08-28 RX ORDER — HYDROMORPHONE HYDROCHLORIDE 1 MG/ML
0.5 INJECTION, SOLUTION INTRAMUSCULAR; INTRAVENOUS; SUBCUTANEOUS ONCE
Status: COMPLETED | OUTPATIENT
Start: 2017-08-28 | End: 2017-08-28

## 2017-08-28 NOTE — ED INITIAL ASSESSMENT (HPI)
States that for the pat 2 days she has increased ABD pain . States she had \" gastric bypass surgery last Monday and has had no BM all week, No fever.  No vomiting

## 2017-08-28 NOTE — ED NOTES
Patient  Accompanied by mother for c/o LLQ abdominal pain with intermittent nausea and constipation since Monday. Patient states she had gastric bypass surgery on Monday and has not had a BM since then.   Patient denies any fever, no cp, no sob, no urinary

## 2017-08-28 NOTE — TELEPHONE ENCOUNTER
Called patient for post-surgery check. Pt was admitted to ED last night for bladder infection. Still taking pain meds; Abdominal pain worsens when getting up to stand.  Patient tolerating liquid without difficulty, able to drink 40 oz water + good protein

## 2017-08-28 NOTE — ED PROVIDER NOTES
Patient Seen in: [facility] Emergency Department    History   Patient presents with:  Abdomen/Flank Pain (GI/)    Stated Complaint: gastric bypass surgery on monday, cant poop, abd pain    HPI    The patient is a 51-year-old female 1 week status Problem Relation Age of Onset   • Hypertension Father    • Cancer Mother      brain   • Other Colt Feldman Mother    • brain tumor Colt Feldman Mother    • Hypertension Brother        Smoking status: Former Smoker erythema or fluctuance   Musculoskeletal: Normal range of motion. Neurological: She is alert and oriented to person, place, and time. Skin: Skin is warm and dry. Rash noted. Psychiatric: She has a normal mood and affect.  Judgment normal.   Nursing no Chest Pa + Lat Chest ([id number]=[id number])    Result Date: 8/28/2017  CONCLUSION:  No acute cardiopulmonary disease         Ct Abdomen Pelvis Iv Contrast, No Oral (er)    Result Date: 8/28/2017  CONCLUSION:   1.  Recent gastric bypass surgical changes with stranding mL (500 mg total) by mouth 3 (three) times daily. Qty: 210 mL Refills: 0    !! HYDROcodone-acetaminophen 7.5-325 MG/15ML Oral Solution  Take 10 mL by mouth every 6 (six) hours as needed for Pain.   Qty: 240 mL Refills: 0    !! - Potential duplicate medicat

## 2017-08-29 ENCOUNTER — TELEPHONE (OUTPATIENT)
Dept: SURGERY | Facility: CLINIC | Age: 32
End: 2017-08-29

## 2017-08-29 NOTE — TELEPHONE ENCOUNTER
Contacted patient to schedule an appointment earlier than 21 September 2017.   Patient able to come in on 18 September 2017 due to appointments and meetings scheduled for the first two weeks of September

## 2017-08-30 ENCOUNTER — OFFICE VISIT (OUTPATIENT)
Dept: SURGERY | Facility: CLINIC | Age: 32
End: 2017-08-30

## 2017-08-30 VITALS
WEIGHT: 293 LBS | DIASTOLIC BLOOD PRESSURE: 94 MMHG | BODY MASS INDEX: 50.02 KG/M2 | RESPIRATION RATE: 16 BRPM | HEART RATE: 71 BPM | SYSTOLIC BLOOD PRESSURE: 160 MMHG | TEMPERATURE: 98 F | HEIGHT: 64 IN

## 2017-08-30 DIAGNOSIS — G47.33 OSA (OBSTRUCTIVE SLEEP APNEA): ICD-10-CM

## 2017-08-30 DIAGNOSIS — E66.01 OBESITY, MORBID, BMI 50 OR HIGHER (HCC): ICD-10-CM

## 2017-08-30 DIAGNOSIS — I10 ESSENTIAL HYPERTENSION, BENIGN: Primary | ICD-10-CM

## 2017-08-30 DIAGNOSIS — R73.03 PREDIABETES: ICD-10-CM

## 2017-08-30 DIAGNOSIS — K59.00 CONSTIPATION, UNSPECIFIED CONSTIPATION TYPE: ICD-10-CM

## 2017-08-30 NOTE — PROGRESS NOTES
[address], [address] [address]  Dept: [phone]    8/30/2017  Bariatric Patient Follow-up Evaluation    Chief Complaint:  Post-op RYGB    History of Present Illnes Social History Main Topics    Smoking status: Former Smoker                                                                Packs/day: 0.00      Years: 12.00          Quit date: 8/1/2016    Smokeless tobacco: Never Used                        Com kg)   LMP 08/04/2017   BMI 52.66 kg/m²   BMI:  Body mass index is 52.66 kg/m².   General: no acute distress, well-nourished  HENT: normocephalic, atraumatic  Lung: breathing comfortably, symmetrical expansion, clear to ausculation bilaterally, no wheezing

## 2017-09-01 ENCOUNTER — OFFICE VISIT (OUTPATIENT)
Dept: FAMILY MEDICINE CLINIC | Facility: CLINIC | Age: 32
End: 2017-09-01

## 2017-09-01 VITALS
BODY MASS INDEX: 50.02 KG/M2 | RESPIRATION RATE: 16 BRPM | WEIGHT: 293 LBS | HEIGHT: 64 IN | HEART RATE: 54 BPM | SYSTOLIC BLOOD PRESSURE: 134 MMHG | OXYGEN SATURATION: 97 % | DIASTOLIC BLOOD PRESSURE: 82 MMHG | TEMPERATURE: 98 F

## 2017-09-01 DIAGNOSIS — K59.00 ACUTE CONSTIPATION: ICD-10-CM

## 2017-09-01 DIAGNOSIS — T83.511D URINARY TRACT INFECTION ASSOCIATED WITH CATHETERIZATION OF URINARY TRACT, UNSPECIFIED INDWELLING URINARY CATHETER TYPE, SUBSEQUENT ENCOUNTER: Primary | ICD-10-CM

## 2017-09-01 DIAGNOSIS — Z98.84 S/P GASTRIC BYPASS: ICD-10-CM

## 2017-09-01 DIAGNOSIS — N39.0 URINARY TRACT INFECTION ASSOCIATED WITH CATHETERIZATION OF URINARY TRACT, UNSPECIFIED INDWELLING URINARY CATHETER TYPE, SUBSEQUENT ENCOUNTER: Primary | ICD-10-CM

## 2017-09-01 PROCEDURE — 99213 OFFICE O/P EST LOW 20 MIN: CPT | Performed by: PHYSICIAN ASSISTANT

## 2017-09-01 NOTE — PROGRESS NOTES
CHIEF COMPLAINT:   Patient presents with:  ER F/U      HPI:   Stephanie Camacho is a 32year old female who presents for ER f/u. Patient woke up from sleep with severe abdominal pain the day she went into the ER. CT scan was wnl.  Patient was exper /82   Pulse 54   Temp 98 °F (36.7 °C) (Oral)   Resp 16   Ht 64\"   Wt (!) 303 lb   LMP 08/04/2017   SpO2 97%   BMI 52.01 kg/m²   GENERAL: well developed, well nourished,in no apparent distress  CARDIO: RRR, no murmurs  LUNGS: clear to ausculation seun A urinary catheter is a thin, flexible tube. It is placed in the bladder to drain urine. Urine flows through the tube into a collecting bag outside of the body. There are different types of urinary catheters. The most common type is an indwelling catheter. · Person is confused, or is not alert, or has a change in behavior (mainly affects older patients)  · Note that sometimes a person won’t have any symptoms but may still have a CAUTI.   Tell a health care provider right away if you or your loved one has any You can help prevent yourself from getting a CAUTI by doing the following:  · Every day ask your health care provider how long you need to have the catheter. The longer you have a catheter, the higher your chance of getting a CAUTI.   · If a caregiver doesn

## 2017-09-01 NOTE — PATIENT INSTRUCTIONS
Catheter-Associated Urinary Tract Infections     A small balloon keeps the catheter in place inside the bladder. A catheter-associated urinary tract infection (CAUTI) is an infection of the urinary system.  CAUTI is caused by germs that enter the urinar Germs can enter the urinary tract as the catheter is put into the urethra. Germs can also get into the urinary tract while the catheter is in place. The common germs that cause a CAUTI are ones that live in the intestine.  These germs don’t normally cause p · Changing the catheter. If you still need a catheter, the old one will be removed. A new one will be put in. This may help stop the infection.   How hospital and long-term facility staff prevent CAUTI  To keep patients from getting a CAUTI, the staff gardenia · Before you leave the hospital, make sure you understand the instructions on how to care for your catheter at home. · Ask your health care provider how long you need the catheter.  Also ask if you need to make a follow-up appointment to have the catheter

## 2017-09-05 ENCOUNTER — TELEPHONE (OUTPATIENT)
Dept: FAMILY MEDICINE CLINIC | Facility: CLINIC | Age: 32
End: 2017-09-05

## 2017-09-05 ENCOUNTER — APPOINTMENT (OUTPATIENT)
Dept: LAB | Age: 32
End: 2017-09-05
Attending: PHYSICIAN ASSISTANT
Payer: COMMERCIAL

## 2017-09-05 DIAGNOSIS — T83.511D URINARY TRACT INFECTION ASSOCIATED WITH CATHETERIZATION OF URINARY TRACT, UNSPECIFIED INDWELLING URINARY CATHETER TYPE, SUBSEQUENT ENCOUNTER: ICD-10-CM

## 2017-09-05 DIAGNOSIS — R79.89 ELEVATED LIVER FUNCTION TESTS: ICD-10-CM

## 2017-09-05 DIAGNOSIS — N39.0 URINARY TRACT INFECTION ASSOCIATED WITH CATHETERIZATION OF URINARY TRACT, UNSPECIFIED INDWELLING URINARY CATHETER TYPE, SUBSEQUENT ENCOUNTER: ICD-10-CM

## 2017-09-05 PROCEDURE — 87086 URINE CULTURE/COLONY COUNT: CPT | Performed by: PHYSICIAN ASSISTANT

## 2017-09-05 NOTE — TELEPHONE ENCOUNTER
Work letter was faxed to [phone] and a confirmation was received. Pt was called and verbalized understanding.

## 2017-09-12 ENCOUNTER — OFFICE VISIT (OUTPATIENT)
Dept: INTERNAL MEDICINE CLINIC | Facility: CLINIC | Age: 32
End: 2017-09-12

## 2017-09-12 VITALS
DIASTOLIC BLOOD PRESSURE: 78 MMHG | SYSTOLIC BLOOD PRESSURE: 124 MMHG | WEIGHT: 293 LBS | RESPIRATION RATE: 16 BRPM | BODY MASS INDEX: 50.02 KG/M2 | HEART RATE: 84 BPM | HEIGHT: 64 IN

## 2017-09-12 DIAGNOSIS — R73.03 PREDIABETES: ICD-10-CM

## 2017-09-12 DIAGNOSIS — F41.9 ANXIETY: ICD-10-CM

## 2017-09-12 DIAGNOSIS — Z98.84 HISTORY OF ROUX-EN-Y GASTRIC BYPASS: ICD-10-CM

## 2017-09-12 DIAGNOSIS — E66.01 OBESITY, MORBID, BMI 50 OR HIGHER (HCC): ICD-10-CM

## 2017-09-12 DIAGNOSIS — Z51.81 ENCOUNTER FOR THERAPEUTIC DRUG MONITORING: Primary | ICD-10-CM

## 2017-09-12 PROCEDURE — 99213 OFFICE O/P EST LOW 20 MIN: CPT | Performed by: INTERNAL MEDICINE

## 2017-09-12 RX ORDER — MINOCYCLINE HYDROCHLORIDE 100 MG/1
CAPSULE ORAL
Refills: 0 | COMMUNITY
Start: 2017-09-11 | End: 2018-01-02 | Stop reason: ALTCHOICE

## 2017-09-12 RX ORDER — GLYCOPYRROLATE 1 MG/1
TABLET ORAL
Refills: 0 | COMMUNITY
Start: 2017-09-11 | End: 2017-11-30 | Stop reason: ALTCHOICE

## 2017-09-12 NOTE — PROGRESS NOTES
CC: Patient presents with:  Weight Check: gastric bypass 3 weeks ago, down 18 lb       HPI:   Obesity, doing well after gastri bypass surgery, feels good. No fevers. No chest pain.        Current Outpatient Prescriptions:  Minocycline HCl 100 MG Oral months. S/p Ayaka-en-Y gastric bypass on 8/21/2017. Was on phentermine, contrave, topamax and metformin in past. Total 19 lb wt loss. Pt with KYLE, saw sleep doc, due mild KYLE, held  off on treatment, will cont to work on wt loss.   Never started belviq.  Hol

## 2017-09-18 ENCOUNTER — OFFICE VISIT (OUTPATIENT)
Dept: SURGERY | Facility: CLINIC | Age: 32
End: 2017-09-18

## 2017-09-18 VITALS — HEIGHT: 64 IN | BODY MASS INDEX: 49.23 KG/M2 | WEIGHT: 288.38 LBS

## 2017-09-18 DIAGNOSIS — E66.01 MORBID OBESITY WITH BMI OF 45.0-49.9, ADULT (HCC): Primary | ICD-10-CM

## 2017-09-18 PROCEDURE — 97803 MED NUTRITION INDIV SUBSEQ: CPT | Performed by: DIETITIAN, REGISTERED

## 2017-09-18 NOTE — PROGRESS NOTES
[address] [place] [address] AND WEIGHT LOSS CLINIC  [address] [place] [address]  Dept: [phone]  Loc: [phone]    09/18/17      Bariatric Follow-up Nutrition Session    Belinda Da Silva is a 32year old female VLDL 18 08/05/2016   TCHDLRATIO 4.42 08/05/2016   NONHDLC 127 07/21/2017   CHOLHDLRATIO 5.4 (H) 06/05/2015        Vitamins/Minerals:    Lab Results  Component Value Date   B12 372 07/21/2017   VITB12 619 08/27/2015       Lab Results  Component Value Date workday  · Frequency: per day    Other:  Patient doing very well post-op. Experienced constipation and  bladder infection initially post-op, now resolved. Tolerating soft/solid diet. Consumes adequate protein and fluids throughout the day.  Pt states she's

## 2017-09-26 ENCOUNTER — OFFICE VISIT (OUTPATIENT)
Dept: SURGERY | Facility: CLINIC | Age: 32
End: 2017-09-26

## 2017-09-26 VITALS
HEART RATE: 92 BPM | WEIGHT: 285.5 LBS | SYSTOLIC BLOOD PRESSURE: 142 MMHG | RESPIRATION RATE: 16 BRPM | BODY MASS INDEX: 48.74 KG/M2 | HEIGHT: 64 IN | DIASTOLIC BLOOD PRESSURE: 78 MMHG

## 2017-09-26 RX ORDER — NORGESTIMATE AND ETHINYL ESTRADIOL 7DAYSX3 28
KIT ORAL
Refills: 2 | COMMUNITY
Start: 2017-09-17 | End: 2018-07-06

## 2017-09-26 NOTE — PROGRESS NOTES
[address], [address] [address]  Dept: [phone]    9/26/2017  Bariatric New Patient Evaluation    Chief Complaint: Postoperative follow-up    History of Present Il Years: 12.00          Quit date: 8/1/2016    Smokeless tobacco: Never Used                        Comment: 5 cig daily    Alcohol use:  Yes                Comment: social    Drug use: No            Other Topics            Concern  Caffeine Concern        No

## 2017-10-23 ENCOUNTER — TELEPHONE (OUTPATIENT)
Dept: SURGERY | Facility: CLINIC | Age: 32
End: 2017-10-23

## 2017-10-31 ENCOUNTER — OFFICE VISIT (OUTPATIENT)
Dept: SURGERY | Facility: CLINIC | Age: 32
End: 2017-10-31

## 2017-10-31 VITALS
SYSTOLIC BLOOD PRESSURE: 128 MMHG | BODY MASS INDEX: 46.3 KG/M2 | DIASTOLIC BLOOD PRESSURE: 78 MMHG | HEIGHT: 64 IN | RESPIRATION RATE: 16 BRPM | HEART RATE: 95 BPM | WEIGHT: 271.19 LBS

## 2017-10-31 DIAGNOSIS — IMO0001 CLASS 3 OBESITY DUE TO EXCESS CALORIES WITHOUT SERIOUS COMORBIDITY WITH BODY MASS INDEX (BMI) OF 45.0 TO 49.9 IN ADULT: Primary | ICD-10-CM

## 2017-10-31 RX ORDER — GARLIC EXTRACT 500 MG
1 CAPSULE ORAL DAILY
COMMUNITY
End: 2018-08-03 | Stop reason: ALTCHOICE

## 2017-10-31 RX ORDER — ADAPALENE 3 MG/G
GEL TOPICAL
Refills: 4 | COMMUNITY
Start: 2017-10-15 | End: 2018-04-02 | Stop reason: ALTCHOICE

## 2017-10-31 NOTE — PROGRESS NOTES
[address], [place] [address] Lavonne Loza  [address]  Dept: [phone]    10/31/2017    BARIATRIC EXISTING PATIENT/FOLLOW UP    HPI:  Samanpablo Mackay is a 32year [de-identified] old f

## 2017-11-09 ENCOUNTER — TELEPHONE (OUTPATIENT)
Dept: FAMILY MEDICINE CLINIC | Facility: CLINIC | Age: 32
End: 2017-11-09

## 2017-11-09 NOTE — TELEPHONE ENCOUNTER
Marquez Enciso from [facility] would like to speak with you regarding some test results for patient.  Please call [phone]

## 2017-11-16 ENCOUNTER — OFFICE VISIT (OUTPATIENT)
Dept: SURGERY | Facility: CLINIC | Age: 32
End: 2017-11-16

## 2017-11-16 VITALS — WEIGHT: 264.5 LBS | BODY MASS INDEX: 45.16 KG/M2 | HEIGHT: 64 IN

## 2017-11-16 DIAGNOSIS — E66.01 MORBID OBESITY WITH BMI OF 45.0-49.9, ADULT (HCC): Primary | ICD-10-CM

## 2017-11-16 PROCEDURE — 97803 MED NUTRITION INDIV SUBSEQ: CPT | Performed by: DIETITIAN, REGISTERED

## 2017-11-16 NOTE — PROGRESS NOTES
[address] [place] [address] AND WEIGHT LOSS CLINIC  [address] [place] [address]  Dept: [phone]  Loc: [phone]    11/16/17      Bariatric Follow-up Nutrition Session    Saman Mackay is a 32year old female 18 08/05/2016   BHAVESHHDNAILA 4.42 08/05/2016   [place] 127 07/21/2017   ROMMEL 5.4 (H) 06/05/2015        Vitamins/Minerals:    Lab Results  Component Value Date   B12 372 07/21/2017   VITB12 619 08/27/2015       Lab Results  Component Value Date   VITD continues to track intake on MyFitnessPal daily, continues to prioritize protein, switched bariatric MVI, has noticed less restriction and able to eat slightly larger portions since surgery though still appropriate portions  Food intolerances:  None new  V

## 2017-11-17 NOTE — TELEPHONE ENCOUNTER
Fili Pyle called from [facility] ER called to ask if we received pt's CT results from her ED visit there on 11/4. I informed her I do not see any documentation regarding we received it & requested she fax it again. CT of abdomen results received.   Dr. Dung Yoder rev

## 2017-11-28 ENCOUNTER — MED REC SCAN ONLY (OUTPATIENT)
Dept: FAMILY MEDICINE CLINIC | Facility: CLINIC | Age: 32
End: 2017-11-28

## 2017-11-30 ENCOUNTER — OFFICE VISIT (OUTPATIENT)
Dept: FAMILY MEDICINE CLINIC | Facility: CLINIC | Age: 32
End: 2017-11-30

## 2017-11-30 VITALS
BODY MASS INDEX: 46.42 KG/M2 | WEIGHT: 262 LBS | RESPIRATION RATE: 16 BRPM | DIASTOLIC BLOOD PRESSURE: 88 MMHG | OXYGEN SATURATION: 97 % | HEIGHT: 63 IN | HEART RATE: 78 BPM | SYSTOLIC BLOOD PRESSURE: 126 MMHG | TEMPERATURE: 98 F

## 2017-11-30 DIAGNOSIS — R91.1 PULMONARY NODULE: ICD-10-CM

## 2017-11-30 DIAGNOSIS — I10 ESSENTIAL HYPERTENSION, BENIGN: ICD-10-CM

## 2017-11-30 DIAGNOSIS — Z87.42 HISTORY OF OVARIAN CYST: Primary | ICD-10-CM

## 2017-11-30 DIAGNOSIS — R87.619 ABNORMAL CERVICAL PAPANICOLAOU SMEAR, UNSPECIFIED ABNORMAL PAP FINDING: ICD-10-CM

## 2017-11-30 DIAGNOSIS — E27.8 ADRENAL NODULE (HCC): ICD-10-CM

## 2017-11-30 PROBLEM — E27.9 ADRENAL NODULE (HCC): Status: ACTIVE | Noted: 2017-11-30

## 2017-11-30 PROCEDURE — 99214 OFFICE O/P EST MOD 30 MIN: CPT | Performed by: PHYSICIAN ASSISTANT

## 2017-11-30 RX ORDER — ACETAMINOPHEN 160 MG
2000 TABLET,DISINTEGRATING ORAL DAILY
COMMUNITY
End: 2018-01-02

## 2017-11-30 NOTE — PROGRESS NOTES
CHIEF COMPLAINT:   Patient presents with:  ER F/U: ER f/u, abdomial pain         HPI:   Judith Watts is a 32year old female who presents to follow up from ER visit at [facility] 11/4/17.  She went to the ER after experencing lower abdomin Packs/day: 0.00      Years: 12.00        Quit date: 8/1/2016  Smokeless tobacco: Never Used                      Comment: 5 cig daily  Alcohol use:  Yes              Comment: social       REVIEW OF SYSTE - Repeat imaging in one year to monitor     Abnormal cervical Papanicolaou smear, unspecified abnormal pap finding         - Colposcopy scheduled.  Followed by gyn

## 2017-12-03 ENCOUNTER — MED REC SCAN ONLY (OUTPATIENT)
Dept: FAMILY MEDICINE CLINIC | Facility: CLINIC | Age: 32
End: 2017-12-03

## 2017-12-07 ENCOUNTER — PRIOR ORIGINAL RECORDS (OUTPATIENT)
Dept: OTHER | Age: 32
End: 2017-12-07

## 2018-01-02 ENCOUNTER — LAB ENCOUNTER (OUTPATIENT)
Dept: LAB | Facility: HOSPITAL | Age: 33
End: 2018-01-02
Attending: SURGERY
Payer: COMMERCIAL

## 2018-01-02 ENCOUNTER — OFFICE VISIT (OUTPATIENT)
Dept: SURGERY | Facility: CLINIC | Age: 33
End: 2018-01-02

## 2018-01-02 VITALS
DIASTOLIC BLOOD PRESSURE: 68 MMHG | HEIGHT: 63 IN | HEART RATE: 71 BPM | WEIGHT: 254.06 LBS | BODY MASS INDEX: 45.02 KG/M2 | SYSTOLIC BLOOD PRESSURE: 122 MMHG

## 2018-01-02 DIAGNOSIS — I10 ESSENTIAL HYPERTENSION, BENIGN: Primary | ICD-10-CM

## 2018-01-02 DIAGNOSIS — E27.8 ADRENAL NODULE (HCC): ICD-10-CM

## 2018-01-02 DIAGNOSIS — E87.8 ELECTROLYTE IMBALANCE: ICD-10-CM

## 2018-01-02 DIAGNOSIS — G47.33 OSA (OBSTRUCTIVE SLEEP APNEA): ICD-10-CM

## 2018-01-02 DIAGNOSIS — IMO0001 CLASS 3 OBESITY DUE TO EXCESS CALORIES WITHOUT SERIOUS COMORBIDITY WITH BODY MASS INDEX (BMI) OF 45.0 TO 49.9 IN ADULT: ICD-10-CM

## 2018-01-02 DIAGNOSIS — R79.89 ELEVATED LIVER FUNCTION TESTS: ICD-10-CM

## 2018-01-02 DIAGNOSIS — Z98.84 HISTORY OF ROUX-EN-Y GASTRIC BYPASS: ICD-10-CM

## 2018-01-02 LAB
ALBUMIN SERPL BCP-MCNC: 3.5 G/DL (ref 3.5–4.8)
ALBUMIN/GLOB SERPL: 1.1 {RATIO} (ref 1–2)
ALP SERPL-CCNC: 58 U/L (ref 32–100)
ALT SERPL-CCNC: 45 U/L (ref 14–54)
ANION GAP SERPL CALC-SCNC: 8 MMOL/L (ref 0–18)
AST SERPL-CCNC: 48 U/L (ref 15–41)
BILIRUB DIRECT SERPL-MCNC: 0.1 MG/DL (ref 0–0.2)
BILIRUB SERPL-MCNC: 0.5 MG/DL (ref 0.3–1.2)
BUN SERPL-MCNC: 4 MG/DL (ref 8–20)
BUN/CREAT SERPL: 6.3 (ref 10–20)
CALCIUM SERPL-MCNC: 9 MG/DL (ref 8.5–10.5)
CHLORIDE SERPL-SCNC: 105 MMOL/L (ref 95–110)
CO2 SERPL-SCNC: 27 MMOL/L (ref 22–32)
CORTIS SERPL-MCNC: 10.3 MCG/DL
CREAT SERPL-MCNC: 0.63 MG/DL (ref 0.5–1.5)
DHEA-S SERPL-MCNC: 116.8 UG/DL (ref 20–266)
ERYTHROCYTE [DISTWIDTH] IN BLOOD BY AUTOMATED COUNT: 13.9 % (ref 11–15)
FERRITIN SERPL IA-MCNC: 25 NG/ML (ref 11–307)
FOLATE SERPL-MCNC: >23.4 NG/ML
GLOBULIN PLAS-MCNC: 3.3 G/DL (ref 2.5–3.7)
GLUCOSE SERPL-MCNC: 88 MG/DL (ref 70–99)
HCT VFR BLD AUTO: 38.4 % (ref 35–48)
HGB BLD-MCNC: 12.8 G/DL (ref 12–16)
IRON SATN MFR SERPL: 13 % (ref 15–50)
IRON SERPL-MCNC: 52 MCG/DL (ref 28–170)
MAGNESIUM SERPL-MCNC: 1.8 MG/DL (ref 1.8–2.5)
MCH RBC QN AUTO: 30.1 PG (ref 27–32)
MCHC RBC AUTO-ENTMCNC: 33.3 G/DL (ref 32–37)
MCV RBC AUTO: 90.5 FL (ref 80–100)
OSMOLALITY UR CALC.SUM OF ELEC: 286 MOSM/KG (ref 275–295)
PLATELET # BLD AUTO: 301 K/UL (ref 140–400)
PMV BLD AUTO: 9 FL (ref 7.4–10.3)
POTASSIUM SERPL-SCNC: 4 MMOL/L (ref 3.3–5.1)
PROT SERPL-MCNC: 6.8 G/DL (ref 5.9–8.4)
RBC # BLD AUTO: 4.24 M/UL (ref 3.7–5.4)
SODIUM SERPL-SCNC: 140 MMOL/L (ref 136–144)
TIBC SERPL-MCNC: 393 MCG/DL (ref 228–428)
TRANSFERRIN SERPL-MCNC: 298 MG/DL (ref 192–382)
VIT B12 SERPL-MCNC: 308 PG/ML (ref 181–914)
WBC # BLD AUTO: 7 K/UL (ref 4–11)

## 2018-01-02 PROCEDURE — 36415 COLL VENOUS BLD VENIPUNCTURE: CPT

## 2018-01-02 PROCEDURE — 82533 TOTAL CORTISOL: CPT

## 2018-01-02 PROCEDURE — 84425 ASSAY OF VITAMIN B-1: CPT

## 2018-01-02 PROCEDURE — 82728 ASSAY OF FERRITIN: CPT

## 2018-01-02 PROCEDURE — 82627 DEHYDROEPIANDROSTERONE: CPT

## 2018-01-02 PROCEDURE — 83735 ASSAY OF MAGNESIUM: CPT

## 2018-01-02 PROCEDURE — 82746 ASSAY OF FOLIC ACID SERUM: CPT

## 2018-01-02 PROCEDURE — 82607 VITAMIN B-12: CPT

## 2018-01-02 PROCEDURE — 83540 ASSAY OF IRON: CPT

## 2018-01-02 PROCEDURE — 82306 VITAMIN D 25 HYDROXY: CPT

## 2018-01-02 PROCEDURE — 82248 BILIRUBIN DIRECT: CPT

## 2018-01-02 PROCEDURE — 84466 ASSAY OF TRANSFERRIN: CPT

## 2018-01-02 PROCEDURE — 99214 OFFICE O/P EST MOD 30 MIN: CPT | Performed by: INTERNAL MEDICINE

## 2018-01-02 PROCEDURE — 82024 ASSAY OF ACTH: CPT

## 2018-01-02 PROCEDURE — 80053 COMPREHEN METABOLIC PANEL: CPT

## 2018-01-02 PROCEDURE — 85027 COMPLETE CBC AUTOMATED: CPT

## 2018-01-02 NOTE — PROGRESS NOTES
[address], [address]  Dept: [phone]    Date: 2018    Patient:  Irma Castro  :      1985  MRN:      [id number]    Referring Pr History:  Past Surgical History:  2008: ANKLE FRACTURE SURGERY      Comment: Rt.   08/21/2017: GASTRIC BYPASS,OBESE<100CM MOE-EN-Y  6/5/2017: LASER SURGERY OF EYE Bilateral    Family History:    Family History   Problem Relation Age of Onset   • Hypertens

## 2018-01-03 LAB — ADRENOCORTICOTROPIC HORMONE: <5 PG/ML

## 2018-01-04 LAB — VITAMIN B1 (THIAMINE), WHOLE B: 53 NMOL/L

## 2018-01-05 LAB — 25(OH)D3 SERPL-MCNC: 45.5 NG/ML

## 2018-01-08 ENCOUNTER — TELEPHONE (OUTPATIENT)
Dept: FAMILY MEDICINE CLINIC | Facility: CLINIC | Age: 33
End: 2018-01-08

## 2018-01-08 DIAGNOSIS — E27.8 LEFT ADRENAL MASS (HCC): Primary | ICD-10-CM

## 2018-01-09 NOTE — TELEPHONE ENCOUNTER
Pt is calling for lab results. Can you call pt back again or explain results to us so we can call her back?

## 2018-01-10 NOTE — TELEPHONE ENCOUNTER
Called and spoke with pt. Pt informed of lab results below. Pt also informed that Dr. Martinez Dress office has sent her additional instructions via [employer]. Pt states understanding and agrees to plan. Pt has no further questions at this time.

## 2018-01-10 NOTE — TELEPHONE ENCOUNTER
Notes Recorded by Ramón Patel PA-C on 1/9/2018 at 5:47 PM CST  Spoke with patient. Low thiamine level-patient has history of gastric bypass-recommend  mg per day.  Patient denies ext swelling/chest pain/SOB/confusion/headaches/tingling/nausea

## 2018-01-11 ENCOUNTER — LAB ENCOUNTER (OUTPATIENT)
Dept: LAB | Age: 33
End: 2018-01-11
Attending: PHYSICIAN ASSISTANT
Payer: COMMERCIAL

## 2018-01-11 ENCOUNTER — TELEPHONE (OUTPATIENT)
Dept: SURGERY | Facility: CLINIC | Age: 33
End: 2018-01-11

## 2018-01-11 DIAGNOSIS — E27.8 LEFT ADRENAL MASS (HCC): ICD-10-CM

## 2018-01-11 LAB
FREE T4: 1.3 NG/DL (ref 0.9–1.8)
FSH: 9.1 MIU/ML
LH: 5 MIU/ML
PROLACTIN: 7.7 NG/ML
TSI SER-ACNC: 0.09 MIU/ML (ref 0.35–5.5)

## 2018-01-11 PROCEDURE — 84443 ASSAY THYROID STIM HORMONE: CPT | Performed by: PHYSICIAN ASSISTANT

## 2018-01-11 PROCEDURE — 84146 ASSAY OF PROLACTIN: CPT | Performed by: PHYSICIAN ASSISTANT

## 2018-01-11 PROCEDURE — 83002 ASSAY OF GONADOTROPIN (LH): CPT | Performed by: PHYSICIAN ASSISTANT

## 2018-01-11 PROCEDURE — 83001 ASSAY OF GONADOTROPIN (FSH): CPT | Performed by: PHYSICIAN ASSISTANT

## 2018-01-11 PROCEDURE — 36415 COLL VENOUS BLD VENIPUNCTURE: CPT | Performed by: PHYSICIAN ASSISTANT

## 2018-01-11 PROCEDURE — 84439 ASSAY OF FREE THYROXINE: CPT | Performed by: PHYSICIAN ASSISTANT

## 2018-01-11 NOTE — TELEPHONE ENCOUNTER
1/10/18 @ 10:32am Spoke to John florencio at [facility], [phone], Ref#[id number]. He verified that patient has following benefits for Bariatric services:  No Weight management criteria. No TYLOR/Blue Distinction required.  ABBEY (DFW#[id number]) DX E66.01 Pt has bene

## 2018-01-12 ENCOUNTER — TELEPHONE (OUTPATIENT)
Dept: FAMILY MEDICINE CLINIC | Facility: CLINIC | Age: 33
End: 2018-01-12

## 2018-01-12 DIAGNOSIS — R79.89 LOW TSH LEVEL: Primary | ICD-10-CM

## 2018-01-12 NOTE — TELEPHONE ENCOUNTER
Left voicemail after discussing case with Dr. Zane Bright.  Discussed low TSH levels, and need to investigate further with thyroid ultrasound and additional blood work including repeat TSH/T4, thyroid peroxidase and thyroglobulin antibodies, thyroid stimulati

## 2018-01-15 ENCOUNTER — HOSPITAL ENCOUNTER (OUTPATIENT)
Dept: MRI IMAGING | Age: 33
Discharge: HOME | End: 2018-01-15
Attending: PHYSICIAN ASSISTANT
Payer: COMMERCIAL

## 2018-01-15 ENCOUNTER — HOSPITAL ENCOUNTER (OUTPATIENT)
Dept: ULTRASOUND IMAGING | Age: 33
Discharge: HOME | End: 2018-01-15
Attending: PHYSICIAN ASSISTANT
Payer: COMMERCIAL

## 2018-01-15 DIAGNOSIS — E27.8 ADRENAL MASS (HCC): Primary | ICD-10-CM

## 2018-01-15 DIAGNOSIS — R79.89 LOW TSH LEVEL: ICD-10-CM

## 2018-01-15 DIAGNOSIS — E27.8 ADRENAL MASS (HCC): ICD-10-CM

## 2018-01-15 DIAGNOSIS — E27.8 LEFT ADRENAL MASS (HCC): ICD-10-CM

## 2018-01-15 PROCEDURE — 74183 MRI ABD W/O CNTR FLWD CNTR: CPT | Performed by: EMERGENCY MEDICINE

## 2018-01-15 PROCEDURE — 76536 US EXAM OF HEAD AND NECK: CPT | Performed by: PHYSICIAN ASSISTANT

## 2018-01-15 PROCEDURE — A9575 INJ GADOTERATE MEGLUMI 0.1ML: HCPCS | Performed by: PHYSICIAN ASSISTANT

## 2018-01-16 ENCOUNTER — LAB ENCOUNTER (OUTPATIENT)
Dept: LAB | Age: 33
End: 2018-01-16
Attending: PHYSICIAN ASSISTANT
Payer: COMMERCIAL

## 2018-01-16 DIAGNOSIS — R79.89 LOW TSH LEVEL: ICD-10-CM

## 2018-01-16 LAB
FREE T4: 1.6 NG/DL (ref 0.9–1.8)
T3RU NFR SERPL: 29 % (ref 23–37)
TSI SER-ACNC: 0.01 MIU/ML (ref 0.35–5.5)

## 2018-01-16 PROCEDURE — 86800 THYROGLOBULIN ANTIBODY: CPT | Performed by: PHYSICIAN ASSISTANT

## 2018-01-16 PROCEDURE — 36415 COLL VENOUS BLD VENIPUNCTURE: CPT | Performed by: PHYSICIAN ASSISTANT

## 2018-01-16 PROCEDURE — 86376 MICROSOMAL ANTIBODY EACH: CPT | Performed by: PHYSICIAN ASSISTANT

## 2018-01-16 PROCEDURE — 84443 ASSAY THYROID STIM HORMONE: CPT | Performed by: PHYSICIAN ASSISTANT

## 2018-01-16 PROCEDURE — 84445 ASSAY OF TSI GLOBULIN: CPT | Performed by: PHYSICIAN ASSISTANT

## 2018-01-16 PROCEDURE — 84439 ASSAY OF FREE THYROXINE: CPT | Performed by: PHYSICIAN ASSISTANT

## 2018-01-17 ENCOUNTER — TELEPHONE (OUTPATIENT)
Dept: FAMILY MEDICINE CLINIC | Facility: CLINIC | Age: 33
End: 2018-01-17

## 2018-01-17 DIAGNOSIS — R79.89 LOW TSH LEVEL: ICD-10-CM

## 2018-01-17 DIAGNOSIS — E04.2 MULTIPLE THYROID NODULES: Primary | ICD-10-CM

## 2018-01-17 LAB
ANTI-THYROGLOBULIN: 17 U/ML (ref ?–60)
ANTI-THYROPEROXIDASE: 41 U/ML (ref ?–60)

## 2018-01-17 NOTE — TELEPHONE ENCOUNTER
Called patient to discuss multiple thyroid nodules on thyroid ultrasound and low TSH levels. No answer, left voicemail. Reviewed recent thyroid labs, which were normal other than low TSH.  Explained that her findings are suggestive of hyperthyroidism, and

## 2018-01-22 LAB — THYROID STIMULATING IMMUNOGLOBULIN: 90 %

## 2018-01-29 ENCOUNTER — HOSPITAL ENCOUNTER (OUTPATIENT)
Dept: ULTRASOUND IMAGING | Facility: HOSPITAL | Age: 33
Discharge: HOME | End: 2018-01-29
Attending: PHYSICIAN ASSISTANT
Payer: COMMERCIAL

## 2018-01-29 DIAGNOSIS — E04.2 MULTIPLE THYROID NODULES: ICD-10-CM

## 2018-01-29 DIAGNOSIS — R79.89 LOW TSH LEVEL: ICD-10-CM

## 2018-01-29 PROCEDURE — 88173 CYTOPATH EVAL FNA REPORT: CPT | Performed by: PHYSICIAN ASSISTANT

## 2018-01-29 PROCEDURE — 10022 US FNA THYROID (CPT=10022/76942): CPT | Performed by: PHYSICIAN ASSISTANT

## 2018-01-29 PROCEDURE — 76942 ECHO GUIDE FOR BIOPSY: CPT | Performed by: PHYSICIAN ASSISTANT

## 2018-01-30 ENCOUNTER — TELEPHONE (OUTPATIENT)
Dept: SURGERY | Facility: CLINIC | Age: 33
End: 2018-01-30

## 2018-01-30 ENCOUNTER — NURSE ONLY (OUTPATIENT)
Dept: SURGERY | Facility: CLINIC | Age: 33
End: 2018-01-30

## 2018-01-30 ENCOUNTER — OFFICE VISIT (OUTPATIENT)
Dept: SURGERY | Facility: CLINIC | Age: 33
End: 2018-01-30

## 2018-01-30 VITALS
BODY MASS INDEX: 44.09 KG/M2 | RESPIRATION RATE: 16 BRPM | HEART RATE: 87 BPM | SYSTOLIC BLOOD PRESSURE: 135 MMHG | HEIGHT: 63 IN | WEIGHT: 248.81 LBS | DIASTOLIC BLOOD PRESSURE: 87 MMHG

## 2018-01-30 DIAGNOSIS — E53.8 B12 DEFICIENCY: Primary | ICD-10-CM

## 2018-01-30 DIAGNOSIS — E53.8 B12 DEFICIENCY: ICD-10-CM

## 2018-01-30 DIAGNOSIS — E51.9 THIAMINE DEFICIENCY: ICD-10-CM

## 2018-01-30 DIAGNOSIS — E51.9 THIAMIN DEFICIENCY: Primary | ICD-10-CM

## 2018-01-30 DIAGNOSIS — E51.9 THIAMIN DEFICIENCY: ICD-10-CM

## 2018-01-30 PROCEDURE — 96372 THER/PROPH/DIAG INJ SC/IM: CPT | Performed by: INTERNAL MEDICINE

## 2018-01-30 RX ORDER — ROPINIROLE 0.25 MG/1
0.25 TABLET, FILM COATED ORAL 3 TIMES DAILY
COMMUNITY
End: 2018-04-09

## 2018-01-30 RX ORDER — GLUCOSAMINE/D3/BOSWELLIA SERRA 1500MG-400
TABLET ORAL
COMMUNITY

## 2018-01-30 RX ORDER — THIAMINE HYDROCHLORIDE 100 MG/ML
100 INJECTION, SOLUTION INTRAMUSCULAR; INTRAVENOUS ONCE
Status: SHIPPED | OUTPATIENT
Start: 2018-01-30

## 2018-01-30 RX ORDER — CYANOCOBALAMIN 1000 UG/ML
1000 INJECTION INTRAMUSCULAR; SUBCUTANEOUS ONCE
Status: SHIPPED | OUTPATIENT
Start: 2018-01-30

## 2018-01-30 RX ORDER — ASCORBIC ACID 125 MG
TABLET,CHEWABLE ORAL
COMMUNITY
End: 2018-04-02 | Stop reason: ALTCHOICE

## 2018-01-30 RX ADMIN — CYANOCOBALAMIN 1000 MCG: 1000 INJECTION INTRAMUSCULAR; SUBCUTANEOUS at 08:33:00

## 2018-01-30 RX ADMIN — THIAMINE HYDROCHLORIDE 100 MG: 100 INJECTION, SOLUTION INTRAMUSCULAR; INTRAVENOUS at 08:35:00

## 2018-01-30 NOTE — PROGRESS NOTES
[address], [place] [address] Lavonne Isaacsall [address] [facility],[address]  Dept: [phone]    1/30/2018    BARIATRIC EXISTING PATIENT/FOLLOW UP    HPI:  Norah Leary is a 28year [de-identified] old fe

## 2018-01-30 NOTE — TELEPHONE ENCOUNTER
Spoke with pt about labs and replacement. She will take Thiamine 250mg daily and B12 1000 mcg daily and follow up labs in 6 weeks.

## 2018-01-31 RX ORDER — CYANOCOBALAMIN 1000 UG/ML
1000 INJECTION INTRAMUSCULAR; SUBCUTANEOUS ONCE
Status: COMPLETED | OUTPATIENT
Start: 2018-01-30 | End: 2018-01-30

## 2018-01-31 RX ORDER — THIAMINE HYDROCHLORIDE 100 MG/ML
100 INJECTION, SOLUTION INTRAMUSCULAR; INTRAVENOUS ONCE
Status: COMPLETED | OUTPATIENT
Start: 2018-01-30 | End: 2018-01-30

## 2018-02-12 ENCOUNTER — OFFICE VISIT (OUTPATIENT)
Dept: SURGERY | Facility: CLINIC | Age: 33
End: 2018-02-12

## 2018-02-12 VITALS — HEIGHT: 64 IN | BODY MASS INDEX: 41.23 KG/M2 | WEIGHT: 241.5 LBS

## 2018-02-12 DIAGNOSIS — E66.01 MORBID OBESITY WITH BMI OF 40.0-44.9, ADULT (HCC): Primary | ICD-10-CM

## 2018-02-12 DIAGNOSIS — Z98.84 S/P GASTRIC BYPASS: ICD-10-CM

## 2018-02-12 PROCEDURE — 0358T BIA WHOLE BODY: CPT | Performed by: DIETITIAN, REGISTERED

## 2018-02-12 PROCEDURE — 97803 MED NUTRITION INDIV SUBSEQ: CPT | Performed by: DIETITIAN, REGISTERED

## 2018-02-12 NOTE — PROGRESS NOTES
[address] [place] [address] AND WEIGHT LOSS CLINIC  [address] [place] [address]  Dept: [phone]  Loc: [phone]    02/12/18      Bariatric Follow-up Nutrition Session    Be Taco is a 28year old female 24 07/21/2017   LDL 94 07/21/2017   VLDL 18 08/05/2016   TCHDLRATIO 4.42 08/05/2016   NONHDLC 127 07/21/2017   CHOLMEGAN 5.4 (H) 06/05/2015        Vitamins/Minerals:    Lab Results  Component Value Date   B12 308 01/02/2018   VITB12 619 08/27/2015 bread  1/2 tbsp hummus  1/2 tbsp tzatziki sauce (greek yog)   1/3 C cottage cheese 2 oz greek chicken w/ tomato, cucumber, onion  1 vivien bread  1/2 tbsp hummus  1/2 tbsp tzatziki sauce (greek yog)     Total Calories:  ~675 cece  Excessive in: nothing  Inade light strength training 2 x week with focus on core and lower body.      Monitor/Evaluate     Anthropometric measurements, Food/fluid intake/choices, Food intolerances, Activity level, Vitamin/mineral supplementation, Reinforce goals, Calorie/protein intake

## 2018-02-27 PROBLEM — R87.810 ASCUS WITH POSITIVE HIGH RISK HPV CERVICAL: Status: ACTIVE | Noted: 2017-12-06

## 2018-02-27 PROBLEM — E04.1 NODULAR THYROID DISEASE: Status: ACTIVE | Noted: 2018-02-27

## 2018-02-27 PROBLEM — R87.610 ASCUS WITH POSITIVE HIGH RISK HPV CERVICAL: Status: ACTIVE | Noted: 2017-12-06

## 2018-03-02 ENCOUNTER — LAB ENCOUNTER (OUTPATIENT)
Dept: LAB | Age: 33
End: 2018-03-02
Attending: INTERNAL MEDICINE
Payer: COMMERCIAL

## 2018-03-02 DIAGNOSIS — D49.7 ADRENAL TUMOR: ICD-10-CM

## 2018-03-02 DIAGNOSIS — R73.03 PREDIABETES: ICD-10-CM

## 2018-03-02 DIAGNOSIS — E27.8 ADRENAL NODULE (HCC): ICD-10-CM

## 2018-03-02 DIAGNOSIS — E04.1 NODULAR THYROID DISEASE: ICD-10-CM

## 2018-03-02 LAB — CORTISOL: 1.8 UG/DL

## 2018-03-02 PROCEDURE — 36415 COLL VENOUS BLD VENIPUNCTURE: CPT

## 2018-03-02 PROCEDURE — 82533 TOTAL CORTISOL: CPT

## 2018-03-05 NOTE — PROGRESS NOTES
Patient informed of Dr. Martinez Limon result note. Patient verbalized understanding and agrees with plan.

## 2018-03-05 NOTE — PROGRESS NOTES
Telephone Information:  Mobile          [phone]    [facility] regarding Dr. Martinez Limon result note. Hours and number given.

## 2018-03-22 ENCOUNTER — TELEPHONE (OUTPATIENT)
Dept: SURGERY | Facility: CLINIC | Age: 33
End: 2018-03-22

## 2018-03-22 DIAGNOSIS — E51.9 THIAMINE DEFICIENCY: Primary | ICD-10-CM

## 2018-03-22 DIAGNOSIS — Z98.84 H/O GASTRIC BYPASS: ICD-10-CM

## 2018-03-22 DIAGNOSIS — E66.01 MORBID OBESITY WITH BMI OF 40.0-44.9, ADULT (HCC): ICD-10-CM

## 2018-03-22 DIAGNOSIS — E55.9 VITAMIN D DEFICIENCY: ICD-10-CM

## 2018-03-22 DIAGNOSIS — E53.8 LOW SERUM VITAMIN B12: ICD-10-CM

## 2018-03-23 ENCOUNTER — LAB ENCOUNTER (OUTPATIENT)
Dept: LAB | Age: 33
End: 2018-03-23
Attending: INTERNAL MEDICINE
Payer: COMMERCIAL

## 2018-03-23 DIAGNOSIS — Z98.84 H/O GASTRIC BYPASS: ICD-10-CM

## 2018-03-23 DIAGNOSIS — E51.9 THIAMINE DEFICIENCY: ICD-10-CM

## 2018-03-23 DIAGNOSIS — E53.8 LOW SERUM VITAMIN B12: ICD-10-CM

## 2018-03-23 DIAGNOSIS — E66.01 MORBID OBESITY WITH BMI OF 40.0-44.9, ADULT (HCC): ICD-10-CM

## 2018-03-23 DIAGNOSIS — E55.9 VITAMIN D DEFICIENCY: ICD-10-CM

## 2018-03-23 LAB
25-HYDROXYVITAMIN D (TOTAL): 34.7 NG/ML (ref 30–100)
ALBUMIN SERPL-MCNC: 3.4 G/DL (ref 3.5–4.8)
ALP LIVER SERPL-CCNC: 63 U/L (ref 37–98)
ALT SERPL-CCNC: 22 U/L (ref 14–54)
AST SERPL-CCNC: 18 U/L (ref 15–41)
BASOPHILS # BLD AUTO: 0.05 X10(3) UL (ref 0–0.1)
BASOPHILS NFR BLD AUTO: 0.8 %
BILIRUB SERPL-MCNC: 0.4 MG/DL (ref 0.1–2)
BUN BLD-MCNC: 13 MG/DL (ref 8–20)
CALCIUM BLD-MCNC: 9 MG/DL (ref 8.3–10.3)
CHLORIDE: 104 MMOL/L (ref 101–111)
CO2: 30 MMOL/L (ref 22–32)
CREAT BLD-MCNC: 0.64 MG/DL (ref 0.55–1.02)
EOSINOPHIL # BLD AUTO: 0.17 X10(3) UL (ref 0–0.3)
EOSINOPHIL NFR BLD AUTO: 2.7 %
ERYTHROCYTE [DISTWIDTH] IN BLOOD BY AUTOMATED COUNT: 13.2 % (ref 11.5–16)
GLUCOSE BLD-MCNC: 100 MG/DL (ref 70–99)
HAV AB SERPL IA-ACNC: 1387 PG/ML (ref 193–986)
HCT VFR BLD AUTO: 38.1 % (ref 34–50)
HGB BLD-MCNC: 12.2 G/DL (ref 12–16)
IMMATURE GRANULOCYTE COUNT: 0.01 X10(3) UL (ref 0–1)
IMMATURE GRANULOCYTE RATIO %: 0.2 %
LYMPHOCYTES # BLD AUTO: 1.94 X10(3) UL (ref 0.9–4)
LYMPHOCYTES NFR BLD AUTO: 31.3 %
M PROTEIN MFR SERPL ELPH: 7.4 G/DL (ref 6.1–8.3)
MCH RBC QN AUTO: 30 PG (ref 27–33.2)
MCHC RBC AUTO-ENTMCNC: 32 G/DL (ref 31–37)
MCV RBC AUTO: 93.6 FL (ref 81–100)
MONOCYTES # BLD AUTO: 0.61 X10(3) UL (ref 0.1–1)
MONOCYTES NFR BLD AUTO: 9.8 %
NEUTROPHIL ABS PRELIM: 3.42 X10 (3) UL (ref 1.3–6.7)
NEUTROPHILS # BLD AUTO: 3.42 X10(3) UL (ref 1.3–6.7)
NEUTROPHILS NFR BLD AUTO: 55.2 %
PLATELET # BLD AUTO: 268 10(3)UL (ref 150–450)
POTASSIUM SERPL-SCNC: 3.8 MMOL/L (ref 3.6–5.1)
RBC # BLD AUTO: 4.07 X10(6)UL (ref 3.8–5.1)
RED CELL DISTRIBUTION WIDTH-SD: 45.5 FL (ref 35.1–46.3)
SODIUM SERPL-SCNC: 138 MMOL/L (ref 136–144)
WBC # BLD AUTO: 6.2 X10(3) UL (ref 4–13)

## 2018-03-23 PROCEDURE — 80053 COMPREHEN METABOLIC PANEL: CPT

## 2018-03-23 PROCEDURE — 36415 COLL VENOUS BLD VENIPUNCTURE: CPT

## 2018-03-23 PROCEDURE — 82306 VITAMIN D 25 HYDROXY: CPT

## 2018-03-23 PROCEDURE — 82607 VITAMIN B-12: CPT

## 2018-03-23 PROCEDURE — 84425 ASSAY OF VITAMIN B-1: CPT

## 2018-03-23 PROCEDURE — 85025 COMPLETE CBC W/AUTO DIFF WBC: CPT

## 2018-03-27 ENCOUNTER — LAB ENCOUNTER (OUTPATIENT)
Dept: LAB | Age: 33
End: 2018-03-27
Attending: FAMILY MEDICINE
Payer: COMMERCIAL

## 2018-03-27 DIAGNOSIS — E27.8 ADRENAL NODULE (HCC): ICD-10-CM

## 2018-03-27 PROCEDURE — 83835 ASSAY OF METANEPHRINES: CPT

## 2018-03-27 PROCEDURE — 82384 ASSAY THREE CATECHOLAMINES: CPT

## 2018-03-28 LAB — VITAMIN B1 (THIAMINE), WHOLE B: 313 NMOL/L

## 2018-03-29 ENCOUNTER — APPOINTMENT (OUTPATIENT)
Dept: LAB | Age: 33
End: 2018-03-29
Attending: INTERNAL MEDICINE
Payer: COMMERCIAL

## 2018-03-29 DIAGNOSIS — E27.8 ADRENAL NODULE (HCC): ICD-10-CM

## 2018-03-29 LAB
CREATININE, URINE - PER 24H: 1558 MG/D
CREATININE, URINE - PER VOLUME: 76 MG/DL
DOPAMINE, URINE - PER 24H: 387 UG/D
DOPAMINE, URINE - PER VOLUME: 189 UG/L
DOPAMINE, URINE - RATIO TO CRT: 249 UG/G CRT
EPINEPHRINE, URINE - PER 24H: 6 UG/D
EPINEPHRINE, URINE - PER VOL: 3 UG/L
EPINEPHRINE,URINE-RATIO TO CRT: 4 UG/G CRT
HOURS COLLECTED: 24 HR
NOREPINEPHRINE, URINE - PER 24: 33 UG/D
NOREPINEPHRINE, URINE - RATIO: 21 UG/G CRT
NOREPINEPHRINE, URINE-PER VOL: 16 UG/L
TOTAL VOLUME: 2050 ML

## 2018-03-29 PROCEDURE — 82088 ASSAY OF ALDOSTERONE: CPT

## 2018-03-29 PROCEDURE — 36415 COLL VENOUS BLD VENIPUNCTURE: CPT

## 2018-03-29 PROCEDURE — 84244 ASSAY OF RENIN: CPT

## 2018-03-30 LAB
CREATININE, URINE - PER 24H: 1558 MG/D
CREATININE, URINE - PER VOLUME: 76 MG/DL
HOURS COLLECTED: 24 HR
METANEPHRINE, UR- RATIO TO CRT: 51 UG/G CRT
METANEPHRINE, URINE - PER 24H: 80 UG/D
METANEPHRINE, URINE-PER VOLUME: 39 UG/L
NORMETANEPHRINE, UR-PER VOLUME: 98 UG/L
NORMETANEPHRINE, URINE - RATIO: 129 UG/G CRT
NORMETANEPHRINE, URINE-PER 24H: 201 UG/D
TOTAL VOLUME: 2050 ML

## 2018-03-30 NOTE — PROGRESS NOTES
[phone] (home)     Patient informed of Dr. Telma Moura result note. Patient verbalized understanding and agrees with plan.

## 2018-04-02 ENCOUNTER — OFFICE VISIT (OUTPATIENT)
Dept: SURGERY | Facility: CLINIC | Age: 33
End: 2018-04-02

## 2018-04-02 VITALS — BODY MASS INDEX: 39.8 KG/M2 | WEIGHT: 233.13 LBS | HEIGHT: 64 IN

## 2018-04-02 DIAGNOSIS — R73.03 PREDIABETES: ICD-10-CM

## 2018-04-02 DIAGNOSIS — Z98.84 HISTORY OF ROUX-EN-Y GASTRIC BYPASS: ICD-10-CM

## 2018-04-02 DIAGNOSIS — G47.33 OSA (OBSTRUCTIVE SLEEP APNEA): ICD-10-CM

## 2018-04-02 DIAGNOSIS — I10 ESSENTIAL HYPERTENSION, BENIGN: Primary | ICD-10-CM

## 2018-04-02 PROCEDURE — 99213 OFFICE O/P EST LOW 20 MIN: CPT | Performed by: INTERNAL MEDICINE

## 2018-04-02 RX ORDER — CHOLECALCIFEROL (VITAMIN D3) 25 MCG
TABLET,CHEWABLE ORAL DAILY
COMMUNITY
End: 2019-01-06

## 2018-04-02 RX ORDER — METHION/INOS/CHOL BT/B COM/LIV 110MG-86MG
CAPSULE ORAL DAILY
COMMUNITY
End: 2018-04-09

## 2018-04-02 NOTE — PROGRESS NOTES
[address] [place] [address],[address]  Dept: [phone]    Date: 2018    Patient:  Yenifer Preciado  :      1985  MRN:      [id number]    Referring Pr daily., Disp: 90 tablet, Rfl: 3    Allergies:  Penicillin G; Sulfa Antibiotics; Pcn [Penicillins]     Social History:  Smoking status: Former Smoker                                                              Packs/day: 0.00      Years: 12.00        Quit pressure stable on the above medications. No interval change in antihypertensive medication.      KYLE: improved with weight loss    Doing well    Blood work done  Continue pro biotic and bariatric mvi  Hold thiamine supplements    Keep areas under skin fold

## 2018-04-09 ENCOUNTER — OFFICE VISIT (OUTPATIENT)
Dept: FAMILY MEDICINE CLINIC | Facility: CLINIC | Age: 33
End: 2018-04-09

## 2018-04-09 VITALS
HEART RATE: 106 BPM | OXYGEN SATURATION: 98 % | SYSTOLIC BLOOD PRESSURE: 122 MMHG | BODY MASS INDEX: 39.61 KG/M2 | HEIGHT: 64 IN | RESPIRATION RATE: 18 BRPM | WEIGHT: 232 LBS | DIASTOLIC BLOOD PRESSURE: 70 MMHG | TEMPERATURE: 99 F

## 2018-04-09 DIAGNOSIS — R59.1 LYMPHADENOPATHY: ICD-10-CM

## 2018-04-09 DIAGNOSIS — J01.00 ACUTE NON-RECURRENT MAXILLARY SINUSITIS: Primary | ICD-10-CM

## 2018-04-09 PROCEDURE — 99213 OFFICE O/P EST LOW 20 MIN: CPT | Performed by: NURSE PRACTITIONER

## 2018-04-09 RX ORDER — CEFDINIR 300 MG/1
300 CAPSULE ORAL 2 TIMES DAILY
Qty: 20 CAPSULE | Refills: 0 | Status: SHIPPED | OUTPATIENT
Start: 2018-04-09 | End: 2018-05-09 | Stop reason: ALTCHOICE

## 2018-04-09 NOTE — PROGRESS NOTES
CHIEF COMPLAINT:   Patient presents with:  Sinus Problem: x2 weeks---sinus drainage, thick spit, sore throat, sinus issues (started with a swollen lymph node)      HPI:   Deepika De Jesus is a 28year old female who presents for sore throat, tender lasik 6/17 - no glasses      Past Surgical History:  2008: ANKLE FRACTURE SURGERY      Comment: Rt.   08/21/2017: GASTRIC BYPASS,OBESE<100CM MOE-EN-Y  6/5/2017: LASER SURGERY OF EYE Bilateral   Family History   Problem Relation Age of Onset   • Hypertens LYMPH:  +LT tonsillar lymphadenopathy and tenderness- small. No overlying erythema or streaking. No other LAD in head/neck; no parotid or submandibular LAD.       ASSESSMENT AND PLAN:   ASSESSMENT:  Ventura White is a 28year old female who pre The sinuses are air-filled spaces within the bones of the face. They connect to the inside of the nose. Sinusitis is an inflammation of the tissue lining the sinus cavity. Sinus inflammation can occur during a cold.  It can also be due to allergies to polle · Do not use nasal rinses or irrigation during an acute sinus infection, unless told to by your health care provider. Rinsing may spread the infection to other sinuses.   · Use acetaminophen or ibuprofen to control pain, unless another pain medicine was pre

## 2018-05-09 ENCOUNTER — OFFICE VISIT (OUTPATIENT)
Dept: FAMILY MEDICINE CLINIC | Facility: CLINIC | Age: 33
End: 2018-05-09

## 2018-05-09 VITALS
TEMPERATURE: 99 F | RESPIRATION RATE: 16 BRPM | WEIGHT: 229 LBS | DIASTOLIC BLOOD PRESSURE: 72 MMHG | OXYGEN SATURATION: 98 % | BODY MASS INDEX: 39.09 KG/M2 | HEART RATE: 69 BPM | SYSTOLIC BLOOD PRESSURE: 122 MMHG | HEIGHT: 64 IN

## 2018-05-09 DIAGNOSIS — R35.0 URINE FREQUENCY: Primary | ICD-10-CM

## 2018-05-09 DIAGNOSIS — A64 STD (SEXUALLY TRANSMITTED DISEASE): ICD-10-CM

## 2018-05-09 DIAGNOSIS — N89.8 VAGINAL ITCHING: ICD-10-CM

## 2018-05-09 DIAGNOSIS — N89.8 VAGINAL DISCHARGE: ICD-10-CM

## 2018-05-09 PROCEDURE — 99214 OFFICE O/P EST MOD 30 MIN: CPT | Performed by: PHYSICIAN ASSISTANT

## 2018-05-09 PROCEDURE — 87591 N.GONORRHOEAE DNA AMP PROB: CPT | Performed by: PHYSICIAN ASSISTANT

## 2018-05-09 PROCEDURE — 81003 URINALYSIS AUTO W/O SCOPE: CPT | Performed by: PHYSICIAN ASSISTANT

## 2018-05-09 PROCEDURE — 87491 CHLMYD TRACH DNA AMP PROBE: CPT | Performed by: PHYSICIAN ASSISTANT

## 2018-05-09 PROCEDURE — 87510 GARDNER VAG DNA DIR PROBE: CPT | Performed by: PHYSICIAN ASSISTANT

## 2018-05-09 PROCEDURE — 87480 CANDIDA DNA DIR PROBE: CPT | Performed by: PHYSICIAN ASSISTANT

## 2018-05-09 PROCEDURE — 87660 TRICHOMONAS VAGIN DIR PROBE: CPT | Performed by: PHYSICIAN ASSISTANT

## 2018-05-09 RX ORDER — ADAPALENE 3 MG/G
GEL TOPICAL
Refills: 4 | COMMUNITY
Start: 2018-05-01 | End: 2018-08-10 | Stop reason: ALTCHOICE

## 2018-05-09 RX ORDER — METRONIDAZOLE 10 MG/G
GEL TOPICAL
Refills: 4 | COMMUNITY
Start: 2018-04-09 | End: 2018-05-09 | Stop reason: ALTCHOICE

## 2018-05-09 NOTE — PROGRESS NOTES
CHIEF COMPLAINT:     No chief complaint on file. HPI:   Marvin Blackwell is a 28year old female who presents for STD testing. New unprotected partner two weeks ago. Increased frequency of urination. No burning with urination. No hematuria.  No OF SYSTEMS:   GENERAL: Denies fever, chills  SKIN: Denies rashes, skin wounds or ulcers.   CHEST: Denies chest pain, or palpitations  LUNGS: Denies shortness of breath, cough, or wheezing  GI: See HPI       EXAM:   /72   Pulse 69   Temp 98.5 °F (36.9

## 2018-05-11 ENCOUNTER — TELEPHONE (OUTPATIENT)
Dept: FAMILY MEDICINE CLINIC | Facility: CLINIC | Age: 33
End: 2018-05-11

## 2018-05-11 RX ORDER — METRONIDAZOLE 500 MG/1
500 TABLET ORAL 2 TIMES DAILY
Qty: 14 TABLET | Refills: 0 | Status: SHIPPED | OUTPATIENT
Start: 2018-05-11 | End: 2018-05-18

## 2018-05-11 NOTE — TELEPHONE ENCOUNTER
Discussed results and need for ABT with patient. Patient understands need for Flagyl and no ETOH while taking it. Also informed her CG was negative.     Patient voiced understanding

## 2018-05-11 NOTE — TELEPHONE ENCOUNTER
----- Message from Alon Palomo PA-C sent at 5/10/2018  2:43 PM CDT -----  Bacterial vaginosis-start flagyl 500 mg BID for 7 days. No ETOH with medication.  Negative G/C

## 2018-05-16 ENCOUNTER — TELEPHONE (OUTPATIENT)
Dept: SURGERY | Facility: CLINIC | Age: 33
End: 2018-05-16

## 2018-05-16 NOTE — TELEPHONE ENCOUNTER
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 She wanted to take Flonase nasal spray, informed her that it was safe to take.

## 2018-05-19 ENCOUNTER — OFFICE VISIT (OUTPATIENT)
Dept: FAMILY MEDICINE CLINIC | Facility: CLINIC | Age: 33
End: 2018-05-19

## 2018-05-19 ENCOUNTER — HOSPITAL ENCOUNTER (EMERGENCY)
Age: 33
Discharge: HOME | End: 2018-05-19
Attending: EMERGENCY MEDICINE
Payer: COMMERCIAL

## 2018-05-19 VITALS
BODY MASS INDEX: 38.93 KG/M2 | WEIGHT: 228 LBS | HEIGHT: 64 IN | TEMPERATURE: 99 F | DIASTOLIC BLOOD PRESSURE: 70 MMHG | RESPIRATION RATE: 20 BRPM | OXYGEN SATURATION: 98 % | SYSTOLIC BLOOD PRESSURE: 124 MMHG | HEART RATE: 98 BPM

## 2018-05-19 VITALS
DIASTOLIC BLOOD PRESSURE: 74 MMHG | RESPIRATION RATE: 16 BRPM | HEIGHT: 64 IN | OXYGEN SATURATION: 98 % | HEART RATE: 79 BPM | TEMPERATURE: 98 F | SYSTOLIC BLOOD PRESSURE: 160 MMHG | WEIGHT: 228 LBS | BODY MASS INDEX: 38.93 KG/M2

## 2018-05-19 DIAGNOSIS — J01.00 ACUTE NON-RECURRENT MAXILLARY SINUSITIS: Primary | ICD-10-CM

## 2018-05-19 DIAGNOSIS — J06.9 UPPER RESPIRATORY TRACT INFECTION, UNSPECIFIED TYPE: Primary | ICD-10-CM

## 2018-05-19 PROCEDURE — 99212 OFFICE O/P EST SF 10 MIN: CPT | Performed by: NURSE PRACTITIONER

## 2018-05-19 PROCEDURE — 99283 EMERGENCY DEPT VISIT LOW MDM: CPT

## 2018-05-19 RX ORDER — FLUTICASONE PROPIONATE 50 MCG
SPRAY, SUSPENSION (ML) NASAL DAILY
COMMUNITY
End: 2018-08-03 | Stop reason: ALTCHOICE

## 2018-05-19 RX ORDER — LEVOFLOXACIN 500 MG/1
500 TABLET, FILM COATED ORAL DAILY
Qty: 10 TABLET | Refills: 0 | Status: SHIPPED | OUTPATIENT
Start: 2018-05-19 | End: 2018-05-29

## 2018-05-19 RX ORDER — HYDROCODONE BITARTRATE AND ACETAMINOPHEN 5; 325 MG/1; MG/1
1-2 TABLET ORAL EVERY 4 HOURS PRN
Qty: 15 TABLET | Refills: 0 | Status: SHIPPED | OUTPATIENT
Start: 2018-05-19 | End: 2018-05-26

## 2018-05-19 NOTE — PROGRESS NOTES
CHIEF COMPLAINT:   Patient presents with:  URI: sinus pressure,post nasal drip and ear pain x 1 wk      HPI:   Deepika De Jesus is a 28year old female who presents for upper respiratory symptoms for  5 days.  Patient reports congestion, dry cough, 08/21/2017: GASTRIC BYPASS,OBESE<100CM MOE-EN-Y  6/5/2017: LASER SURGERY OF EYE Bilateral      Smoking status: Former Smoker                                                              Packs/day: 0.00      Years: 12.00        Quit date: 8/1/2016  Michael Discussed HPI and physical exam with pt. No bacterial focus noted on exam. We discussed both viral and allergy etiologies. Treatment options discussed with patient and explained in detail. We reviewed symptomatic care.  We did discuss delayed antimicrobial · You may use acetaminophen or ibuprofen to control pain and fever, unless another medicine was prescribed. If you have chronic liver or kidney disease, have ever had a stomach ulcer or gastrointestinal bleeding, or are taking blood-thinning medicines, dimas

## 2018-05-20 NOTE — ED INITIAL ASSESSMENT (HPI)
Right upper cheek pain that started last night and is radiating into her ear. Seen at urgent care today, told she likely has sinus infection but jesse declined ATB as she was just on them.  Unsure if this is her tooth

## 2018-05-20 NOTE — ED PROVIDER NOTES
Patient Seen in: THE [facility] Emergency Department In [place]    History   Patient presents with:  Cough/URI    Stated Complaint: sinus pain and pressure    HPI    Patient presents with 1 day of pain in the right cheek and right ear. No fever or chills.   Robby Feliz erythema, mild swelling, tenderness over the right maxillary sinus. Otherwise no facial tenderness. Right tympanic membrane dull and red.   Left tympanic membrane is normal.  Throat grossly normal.  Oropharynx moist.  Neck: No meningismus or adenopathy  L

## 2018-06-13 ENCOUNTER — TELEPHONE (OUTPATIENT)
Dept: FAMILY MEDICINE CLINIC | Facility: CLINIC | Age: 33
End: 2018-06-13

## 2018-06-13 NOTE — TELEPHONE ENCOUNTER
Please see below message  Regarding STD labs ordered 5/9. When do you want pt to have these labs done?

## 2018-06-13 NOTE — TELEPHONE ENCOUNTER
Pt called stating she rec'd a letter stating she had over due labs to be done. Pt was under the understanding that Raza Morrison wanted her to wait until July or August to have these done. Pls call pt to advise when she should have the labs done.

## 2018-07-06 ENCOUNTER — TELEPHONE (OUTPATIENT)
Dept: FAMILY MEDICINE CLINIC | Facility: CLINIC | Age: 33
End: 2018-07-06

## 2018-07-06 RX ORDER — NORGESTIMATE AND ETHINYL ESTRADIOL 7DAYSX3 28
1 KIT ORAL DAILY
Qty: 3 PACKAGE | Refills: 0 | Status: SHIPPED | OUTPATIENT
Start: 2018-07-06 | End: 2018-08-10

## 2018-07-20 ENCOUNTER — HOSPITAL ENCOUNTER (OUTPATIENT)
Dept: ULTRASOUND IMAGING | Age: 33
Discharge: HOME | End: 2018-07-20
Attending: INTERNAL MEDICINE
Payer: COMMERCIAL

## 2018-07-20 DIAGNOSIS — E27.8 ADRENAL NODULE (HCC): ICD-10-CM

## 2018-07-20 DIAGNOSIS — E04.1 NODULAR THYROID DISEASE: ICD-10-CM

## 2018-07-20 DIAGNOSIS — D49.7 ADRENAL TUMOR: ICD-10-CM

## 2018-07-20 DIAGNOSIS — R73.03 PREDIABETES: ICD-10-CM

## 2018-07-20 PROCEDURE — 76536 US EXAM OF HEAD AND NECK: CPT | Performed by: INTERNAL MEDICINE

## 2018-08-02 ENCOUNTER — APPOINTMENT (OUTPATIENT)
Dept: LAB | Age: 33
End: 2018-08-02
Attending: PHYSICIAN ASSISTANT
Payer: COMMERCIAL

## 2018-08-02 DIAGNOSIS — A64 STD (SEXUALLY TRANSMITTED DISEASE): ICD-10-CM

## 2018-08-02 PROCEDURE — 86706 HEP B SURFACE ANTIBODY: CPT | Performed by: PHYSICIAN ASSISTANT

## 2018-08-02 PROCEDURE — 87389 HIV-1 AG W/HIV-1&-2 AB AG IA: CPT | Performed by: PHYSICIAN ASSISTANT

## 2018-08-02 PROCEDURE — 86780 TREPONEMA PALLIDUM: CPT | Performed by: PHYSICIAN ASSISTANT

## 2018-08-02 PROCEDURE — 36415 COLL VENOUS BLD VENIPUNCTURE: CPT | Performed by: PHYSICIAN ASSISTANT

## 2018-08-02 PROCEDURE — 87340 HEPATITIS B SURFACE AG IA: CPT | Performed by: PHYSICIAN ASSISTANT

## 2018-08-02 PROCEDURE — 86803 HEPATITIS C AB TEST: CPT | Performed by: PHYSICIAN ASSISTANT

## 2018-08-03 ENCOUNTER — TELEPHONE (OUTPATIENT)
Dept: FAMILY MEDICINE CLINIC | Facility: CLINIC | Age: 33
End: 2018-08-03

## 2018-08-03 ENCOUNTER — OFFICE VISIT (OUTPATIENT)
Dept: SURGERY | Facility: CLINIC | Age: 33
End: 2018-08-03
Payer: COMMERCIAL

## 2018-08-03 VITALS
HEIGHT: 64 IN | WEIGHT: 214 LBS | RESPIRATION RATE: 18 BRPM | OXYGEN SATURATION: 99 % | HEART RATE: 79 BPM | SYSTOLIC BLOOD PRESSURE: 146 MMHG | BODY MASS INDEX: 36.54 KG/M2 | DIASTOLIC BLOOD PRESSURE: 94 MMHG

## 2018-08-03 DIAGNOSIS — R73.03 PREDIABETES: ICD-10-CM

## 2018-08-03 DIAGNOSIS — I10 ESSENTIAL HYPERTENSION, BENIGN: Primary | ICD-10-CM

## 2018-08-03 DIAGNOSIS — E66.9 OBESITY (BMI 30-39.9): ICD-10-CM

## 2018-08-03 DIAGNOSIS — R73.09 ABNORMAL BLOOD SUGAR: ICD-10-CM

## 2018-08-03 DIAGNOSIS — Z98.84 HISTORY OF ROUX-EN-Y GASTRIC BYPASS: ICD-10-CM

## 2018-08-03 DIAGNOSIS — E55.9 VITAMIN D DEFICIENCY: ICD-10-CM

## 2018-08-03 LAB
HBV SURFACE AB SER QL: REACTIVE
HBV SURFACE AB SERPL IA-ACNC: 765.68 MIU/ML
HBV SURFACE AG SER-ACNC: <0.1 [IU]/L
HBV SURFACE AG SERPL QL IA: NONREACTIVE
HCV AB SERPL QL IA: NONREACTIVE
T PALLIDUM AB SER QL IA: NONREACTIVE

## 2018-08-03 PROCEDURE — 99214 OFFICE O/P EST MOD 30 MIN: CPT | Performed by: INTERNAL MEDICINE

## 2018-08-03 NOTE — PROGRESS NOTES
[address], [address]  Dept: [phone]    Date: 2018    Patient:  Romana Blanks  :      1985  MRN:      [id number]    Referring Pr Quit date: 8/1/2016  Smokeless tobacco: Never Used                      Comment: 5 cig daily  Alcohol use:  Yes              Comment: social    Surgical History:  Past Surgical History:  2008: ANKLE FRACTURE SURGERY      Comment: Rt.   08/21/2017: JUDD done  Continue pro biotic and bariatric mvi  Hold thiamine supplements    Keep areas under skin folds dry  May benefit from skin removal after weight loss.       Orders Placed This Encounter      Comp Metabolic Panel (14)      Vitamin D, 25-Hydroxy      Vit

## 2018-08-04 ENCOUNTER — LAB ENCOUNTER (OUTPATIENT)
Dept: LAB | Age: 33
End: 2018-08-04
Attending: INTERNAL MEDICINE
Payer: COMMERCIAL

## 2018-08-04 DIAGNOSIS — R73.09 ABNORMAL BLOOD SUGAR: ICD-10-CM

## 2018-08-04 DIAGNOSIS — E66.9 OBESITY (BMI 30-39.9): ICD-10-CM

## 2018-08-04 DIAGNOSIS — I10 ESSENTIAL HYPERTENSION, BENIGN: ICD-10-CM

## 2018-08-04 DIAGNOSIS — Z98.84 HISTORY OF ROUX-EN-Y GASTRIC BYPASS: ICD-10-CM

## 2018-08-04 DIAGNOSIS — E55.9 VITAMIN D DEFICIENCY: ICD-10-CM

## 2018-08-04 DIAGNOSIS — R73.03 PREDIABETES: ICD-10-CM

## 2018-08-04 LAB
ALBUMIN SERPL-MCNC: 3.5 G/DL (ref 3.5–4.8)
ALBUMIN/GLOB SERPL: 0.9 {RATIO} (ref 1–2)
ALP LIVER SERPL-CCNC: 72 U/L (ref 37–98)
ALT SERPL-CCNC: 26 U/L (ref 14–54)
ANION GAP SERPL CALC-SCNC: 6 MMOL/L (ref 0–18)
AST SERPL-CCNC: 29 U/L (ref 15–41)
BASOPHILS # BLD AUTO: 0.06 X10(3) UL (ref 0–0.1)
BASOPHILS NFR BLD AUTO: 1 %
BILIRUB SERPL-MCNC: 0.5 MG/DL (ref 0.1–2)
BUN BLD-MCNC: 10 MG/DL (ref 8–20)
BUN/CREAT SERPL: 14.5 (ref 10–20)
CALCIUM BLD-MCNC: 9.2 MG/DL (ref 8.3–10.3)
CHLORIDE SERPL-SCNC: 105 MMOL/L (ref 101–111)
CHOLEST SMN-MCNC: 153 MG/DL (ref ?–200)
CO2 SERPL-SCNC: 31 MMOL/L (ref 22–32)
CREAT BLD-MCNC: 0.69 MG/DL (ref 0.55–1.02)
EOSINOPHIL # BLD AUTO: 0.23 X10(3) UL (ref 0–0.3)
EOSINOPHIL NFR BLD AUTO: 3.9 %
ERYTHROCYTE [DISTWIDTH] IN BLOOD BY AUTOMATED COUNT: 14.1 % (ref 11.5–16)
EST. AVERAGE GLUCOSE BLD GHB EST-MCNC: 111 MG/DL (ref 68–126)
GLOBULIN PLAS-MCNC: 4.1 G/DL (ref 2.5–3.7)
GLUCOSE BLD-MCNC: 78 MG/DL (ref 70–99)
HAV AB SERPL IA-ACNC: 1212 PG/ML (ref 193–986)
HBA1C MFR BLD HPLC: 5.5 % (ref ?–5.7)
HCT VFR BLD AUTO: 43 % (ref 34–50)
HDLC SERPL-MCNC: 33 MG/DL (ref 40–59)
HGB BLD-MCNC: 14 G/DL (ref 12–16)
IMMATURE GRANULOCYTE COUNT: 0.01 X10(3) UL (ref 0–1)
IMMATURE GRANULOCYTE RATIO %: 0.2 %
LDLC SERPL CALC-MCNC: 101 MG/DL (ref ?–100)
LYMPHOCYTES # BLD AUTO: 2.48 X10(3) UL (ref 0.9–4)
LYMPHOCYTES NFR BLD AUTO: 41.9 %
M PROTEIN MFR SERPL ELPH: 7.6 G/DL (ref 6.1–8.3)
MCH RBC QN AUTO: 30.2 PG (ref 27–33.2)
MCHC RBC AUTO-ENTMCNC: 32.6 G/DL (ref 31–37)
MCV RBC AUTO: 92.7 FL (ref 81–100)
MONOCYTES # BLD AUTO: 0.46 X10(3) UL (ref 0.1–1)
MONOCYTES NFR BLD AUTO: 7.8 %
NEUTROPHIL ABS PRELIM: 2.68 X10 (3) UL (ref 1.3–6.7)
NEUTROPHILS # BLD AUTO: 2.68 X10(3) UL (ref 1.3–6.7)
NEUTROPHILS NFR BLD AUTO: 45.2 %
NONHDLC SERPL-MCNC: 120 MG/DL (ref ?–130)
OSMOLALITY SERPL CALC.SUM OF ELEC: 292 MOSM/KG (ref 275–295)
PLATELET # BLD AUTO: 264 10(3)UL (ref 150–450)
POTASSIUM SERPL-SCNC: 4.1 MMOL/L (ref 3.6–5.1)
RBC # BLD AUTO: 4.64 X10(6)UL (ref 3.8–5.1)
RED CELL DISTRIBUTION WIDTH-SD: 47.7 FL (ref 35.1–46.3)
SODIUM SERPL-SCNC: 142 MMOL/L (ref 136–144)
TRIGL SERPL-MCNC: 94 MG/DL (ref 30–149)
TSI SER-ACNC: 0.61 MIU/ML (ref 0.35–5.5)
VIT D+METAB SERPL-MCNC: 38 NG/ML (ref 30–100)
VLDLC SERPL CALC-MCNC: 19 MG/DL (ref 0–30)
WBC # BLD AUTO: 5.9 X10(3) UL (ref 4–13)

## 2018-08-04 PROCEDURE — 82306 VITAMIN D 25 HYDROXY: CPT

## 2018-08-04 PROCEDURE — 85025 COMPLETE CBC W/AUTO DIFF WBC: CPT

## 2018-08-04 PROCEDURE — 80061 LIPID PANEL: CPT

## 2018-08-04 PROCEDURE — 82607 VITAMIN B-12: CPT

## 2018-08-04 PROCEDURE — 84443 ASSAY THYROID STIM HORMONE: CPT

## 2018-08-04 PROCEDURE — 84425 ASSAY OF VITAMIN B-1: CPT

## 2018-08-04 PROCEDURE — 36415 COLL VENOUS BLD VENIPUNCTURE: CPT

## 2018-08-04 PROCEDURE — 80053 COMPREHEN METABOLIC PANEL: CPT

## 2018-08-04 PROCEDURE — 83036 HEMOGLOBIN GLYCOSYLATED A1C: CPT

## 2018-08-06 ENCOUNTER — OFFICE VISIT (OUTPATIENT)
Dept: SURGERY | Facility: CLINIC | Age: 33
End: 2018-08-06
Payer: COMMERCIAL

## 2018-08-06 VITALS — WEIGHT: 213.38 LBS | HEIGHT: 64 IN | BODY MASS INDEX: 36.43 KG/M2

## 2018-08-06 DIAGNOSIS — E66.9 OBESITY (BMI 35.0-39.9 WITHOUT COMORBIDITY): ICD-10-CM

## 2018-08-06 PROCEDURE — 97803 MED NUTRITION INDIV SUBSEQ: CPT | Performed by: DIETITIAN, REGISTERED

## 2018-08-06 PROCEDURE — 0358T BIA WHOLE BODY: CPT | Performed by: DIETITIAN, REGISTERED

## 2018-08-06 NOTE — PROGRESS NOTES
[address] [place] [address] AND WEIGHT LOSS CLINIC  [address] [place] [address]  Dept: [phone]  Loc: [phone]    08/06/18      Bariatric Follow-up Nutrition Session    Iram Carnes is a 28year old female 08/04/2018    (H) 08/04/2018   VLDL 19 08/04/2018   TCHDLRATIO 4.42 08/05/2016   NONHDLC 120 08/04/2018   CHOLHDLRATIO 5.4 (H) 06/05/2015        Vitamins/Minerals:    Lab Results  Component Value Date   B12 1,212 (H) 08/04/2018   VITB12 619 08/27/20 veggies       Total Calories:  800-1000 kcal  Excessive in: nothing  Inadequate in:  nothing     Patient has made some modifications and adjustments to diet: yes, continues to prioritize protein at every eating occasion, stopped tracking intake  Food intol time spent with pt: 30 minutes

## 2018-08-10 ENCOUNTER — TELEPHONE (OUTPATIENT)
Dept: FAMILY MEDICINE CLINIC | Facility: CLINIC | Age: 33
End: 2018-08-10

## 2018-08-10 ENCOUNTER — OFFICE VISIT (OUTPATIENT)
Dept: FAMILY MEDICINE CLINIC | Facility: CLINIC | Age: 33
End: 2018-08-10
Payer: COMMERCIAL

## 2018-08-10 VITALS
HEART RATE: 96 BPM | DIASTOLIC BLOOD PRESSURE: 80 MMHG | RESPIRATION RATE: 16 BRPM | WEIGHT: 217 LBS | TEMPERATURE: 99 F | SYSTOLIC BLOOD PRESSURE: 128 MMHG | HEIGHT: 64 IN | OXYGEN SATURATION: 98 % | BODY MASS INDEX: 37.05 KG/M2

## 2018-08-10 DIAGNOSIS — Z30.41 ENCOUNTER FOR BIRTH CONTROL PILLS MAINTENANCE: Primary | ICD-10-CM

## 2018-08-10 DIAGNOSIS — E66.01 MORBID OBESITY (HCC): ICD-10-CM

## 2018-08-10 DIAGNOSIS — Z00.00 ROUTINE GENERAL MEDICAL EXAMINATION AT A HEALTH CARE FACILITY: ICD-10-CM

## 2018-08-10 DIAGNOSIS — Z23 NEED FOR HEPATITIS B VACCINATION: ICD-10-CM

## 2018-08-10 DIAGNOSIS — S46.912A LEFT SHOULDER STRAIN, INITIAL ENCOUNTER: ICD-10-CM

## 2018-08-10 DIAGNOSIS — I10 ESSENTIAL HYPERTENSION, BENIGN: ICD-10-CM

## 2018-08-10 DIAGNOSIS — N76.1 SUBACUTE VAGINITIS: ICD-10-CM

## 2018-08-10 LAB
APPEARANCE: CLEAR
MULTISTIX LOT#: NORMAL NUMERIC
PH, URINE: 6 (ref 4.5–8)
PROTEIN (URINE DIPSTICK): 30 MG/DL
SPECIFIC GRAVITY: 1.02 (ref 1–1.03)
UROBILINOGEN,SEMI-QN: 0.2 MG/DL (ref 0–1.9)
VITAMIN B1 (THIAMINE), WHOLE B: 189 NMOL/L

## 2018-08-10 PROCEDURE — 81003 URINALYSIS AUTO W/O SCOPE: CPT | Performed by: NURSE PRACTITIONER

## 2018-08-10 PROCEDURE — 87591 N.GONORRHOEAE DNA AMP PROB: CPT | Performed by: NURSE PRACTITIONER

## 2018-08-10 PROCEDURE — 90746 HEPB VACCINE 3 DOSE ADULT IM: CPT | Performed by: NURSE PRACTITIONER

## 2018-08-10 PROCEDURE — 87660 TRICHOMONAS VAGIN DIR PROBE: CPT | Performed by: NURSE PRACTITIONER

## 2018-08-10 PROCEDURE — 87510 GARDNER VAG DNA DIR PROBE: CPT | Performed by: NURSE PRACTITIONER

## 2018-08-10 PROCEDURE — 99395 PREV VISIT EST AGE 18-39: CPT | Performed by: NURSE PRACTITIONER

## 2018-08-10 PROCEDURE — 87480 CANDIDA DNA DIR PROBE: CPT | Performed by: NURSE PRACTITIONER

## 2018-08-10 PROCEDURE — 87491 CHLMYD TRACH DNA AMP PROBE: CPT | Performed by: NURSE PRACTITIONER

## 2018-08-10 PROCEDURE — 99214 OFFICE O/P EST MOD 30 MIN: CPT | Performed by: NURSE PRACTITIONER

## 2018-08-10 PROCEDURE — 90471 IMMUNIZATION ADMIN: CPT | Performed by: NURSE PRACTITIONER

## 2018-08-10 RX ORDER — MELOXICAM 15 MG/1
15 TABLET ORAL DAILY
Qty: 30 TABLET | Refills: 0 | Status: SHIPPED | OUTPATIENT
Start: 2018-08-10 | End: 2018-09-09

## 2018-08-10 RX ORDER — PYRIDOXINE HCL (VITAMIN B6) 100 MG
TABLET ORAL
COMMUNITY

## 2018-08-10 RX ORDER — NORGESTIMATE AND ETHINYL ESTRADIOL 7DAYSX3 28
1 KIT ORAL DAILY
Qty: 3 PACKAGE | Refills: 0 | Status: SHIPPED | OUTPATIENT
Start: 2018-08-10 | End: 2018-11-19

## 2018-08-10 RX ORDER — LOSARTAN POTASSIUM 50 MG/1
50 TABLET ORAL DAILY
Qty: 90 TABLET | Refills: 3 | Status: SHIPPED | OUTPATIENT
Start: 2018-08-10 | End: 2019-03-15

## 2018-08-10 NOTE — PROGRESS NOTES
[facility]   PROGRESS NOTE  Chief Complaint:   Patient presents with: Well Adult: annual visit      HPI:   This is a 28year old female coming in for physical and refill on medications.      Physical : 28 y female presents for physical. Patient r -LIPID PANEL   Result Value Ref Range   Cholesterol, Total 153 <200 mg/dL   Triglycerides 94 30 - 149 mg/dL   HDL Cholesterol 33 (L) 40 - 59 mg/dL   LDL Cholesterol 101 (H) <100 mg/dL   VLDL 19 0 - 30 mg/dL   Non HDL Chol 120 <130 mg/dL   -ASSAY, THYROID Packs/day: 0.00      Years: 12.00        Quit date: 8/1/2016  Smokeless tobacco: Never Used                      Comment: 5 cig daily  Alcohol use:  Yes              Comment: social    Family History:  Family History   Problem Relation dysuria   NEUROLOGICAL:  Denies headache, seizures, dizziness, syncope, paralysis  HEMATOLOGIC:  Denies anemia, bleeding or bruising. PSYCHIATRIC:  Denies depression or anxiety.   ENDOCRINOLOGIC:  Denies excessive sweating, cold or heat intolerance, polyur fruits and vegetables and less fatty foods in your diet. Morbid obesity ([address]    Continue follow up with nutritionist     Other orders  -     Meloxicam 15 MG Oral Tab; Take 1 tablet (15 mg total) by mouth daily.         Encounter for birth control p allergies, or worsening or changing symptoms. Patient is to call with any side effects or complications from the treatments as a result of today.      Problem List:  Patient Active Problem List:     Essential hypertension, benign     Lung nodule     Adrena

## 2018-08-12 LAB
C TRACH DNA SPEC QL NAA+PROBE: NEGATIVE
N GONORRHOEA DNA SPEC QL NAA+PROBE: NEGATIVE

## 2018-08-13 ENCOUNTER — TELEPHONE (OUTPATIENT)
Dept: FAMILY MEDICINE CLINIC | Facility: CLINIC | Age: 33
End: 2018-08-13

## 2018-08-13 NOTE — TELEPHONE ENCOUNTER
LVM for pt to call back to review test results. PSR: OK to lync me if/when pt calls back today, while I am in triage. Thank you.

## 2018-08-13 NOTE — TELEPHONE ENCOUNTER
----- Message from ISAEL Quintero-YOUNG sent at 8/13/2018  7:03 AM CDT -----  Results shows Bacterial Vaginosis -sent new prescription for Flagyl BID x 7 days -not sexually transmitted disease-do not drink alcohol while taking antibiotics.

## 2018-08-14 NOTE — TELEPHONE ENCOUNTER
2nd attempt. LVM for pt to call back to review test results. PSR: OK to lync me if/when pt calls back today, while I am in triage. Thank you.

## 2018-08-21 ENCOUNTER — OFFICE VISIT (OUTPATIENT)
Dept: SURGERY | Facility: CLINIC | Age: 33
End: 2018-08-21
Payer: COMMERCIAL

## 2018-08-21 VITALS
RESPIRATION RATE: 16 BRPM | HEIGHT: 63 IN | SYSTOLIC BLOOD PRESSURE: 116 MMHG | DIASTOLIC BLOOD PRESSURE: 72 MMHG | WEIGHT: 215.81 LBS | HEART RATE: 83 BPM | BODY MASS INDEX: 38.24 KG/M2

## 2018-08-21 NOTE — PROGRESS NOTES
[address], [place] [address] Lavonne Loza  [facility]  Dept: [phone]    8/21/2018    BARIATRIC EXISTING PATIENT/FOLLOW UP    HPI:  Sherri Marquez is a 28year [de-identified] old fe

## 2018-09-07 ENCOUNTER — E-VISIT (OUTPATIENT)
Dept: FAMILY MEDICINE CLINIC | Facility: CLINIC | Age: 33
End: 2018-09-07

## 2018-09-07 DIAGNOSIS — R30.0 DYSURIA: Primary | ICD-10-CM

## 2018-09-07 PROCEDURE — 98969 ONLINE SERVICE BY HC PRO: CPT | Performed by: NURSE PRACTITIONER

## 2018-09-07 RX ORDER — NITROFURANTOIN 25; 75 MG/1; MG/1
100 CAPSULE ORAL 2 TIMES DAILY
Qty: 14 CAPSULE | Refills: 0 | Status: SHIPPED | OUTPATIENT
Start: 2018-09-07 | End: 2018-09-14

## 2018-09-12 ENCOUNTER — OFFICE VISIT (OUTPATIENT)
Dept: FAMILY MEDICINE CLINIC | Facility: CLINIC | Age: 33
End: 2018-09-12
Payer: COMMERCIAL

## 2018-09-12 VITALS
HEART RATE: 94 BPM | HEIGHT: 64 IN | WEIGHT: 216 LBS | SYSTOLIC BLOOD PRESSURE: 120 MMHG | RESPIRATION RATE: 20 BRPM | DIASTOLIC BLOOD PRESSURE: 80 MMHG | TEMPERATURE: 99 F | OXYGEN SATURATION: 98 % | BODY MASS INDEX: 36.88 KG/M2

## 2018-09-12 DIAGNOSIS — K12.0 APHTHOUS ULCER: ICD-10-CM

## 2018-09-12 DIAGNOSIS — J02.9 SORE THROAT: Primary | ICD-10-CM

## 2018-09-12 LAB
CONTROL LINE PRESENT WITH A CLEAR BACKGROUND (YES/NO): YES YES/NO
STREP GRP A CUL-SCR: NEGATIVE

## 2018-09-12 PROCEDURE — 87081 CULTURE SCREEN ONLY: CPT | Performed by: PHYSICIAN ASSISTANT

## 2018-09-12 PROCEDURE — 99213 OFFICE O/P EST LOW 20 MIN: CPT | Performed by: PHYSICIAN ASSISTANT

## 2018-09-12 PROCEDURE — 87880 STREP A ASSAY W/OPTIC: CPT | Performed by: PHYSICIAN ASSISTANT

## 2018-09-12 NOTE — PROGRESS NOTES
CHIEF COMPLAINT:   Patient presents with:  Sore Throat: s/s for 2-3 days. OTC Tylenol was taken      HPI:   Iram Carnes is a 28year old female presents to clinic with symptoms of sore throat for 2-3 days. +runny nose. B/l ear pain.  No headac quittin.1      Smokeless tobacco: Never Used      Tobacco comment: 5 cig daily    Alcohol use: Yes      Comment: social    Drug use: No       REVIEW OF SYSTEMS:   GENERAL HEALTH:  See HPI  SKIN: denies any unusual skin lesions or rashes  HEENT: See HPI Meds & Refills for this Visit:  Requested Prescriptions     Signed Prescriptions Disp Refills   • Lidocaine Viscous 2 % Mouth/Throat Solution 200 mL 0     Sig: Gargle with 15ml Q3hrs prn pain     Side effects, risks, benefits, of medication explained

## 2018-09-12 NOTE — PATIENT INSTRUCTIONS
Claritin OTC   Tylenol for pain  Avoid spicy food/juice   Throat culture sent out   Please follow up with PCP if no improvement or if symptoms worsen

## 2018-09-15 ENCOUNTER — TELEPHONE (OUTPATIENT)
Dept: FAMILY MEDICINE CLINIC | Facility: CLINIC | Age: 33
End: 2018-09-15

## 2018-09-15 NOTE — TELEPHONE ENCOUNTER
Spoke with pateint, informed her that throat culture is negative for GABHS. Pt states that she is feeling \"much better\".

## 2018-10-24 ENCOUNTER — TELEPHONE (OUTPATIENT)
Dept: SURGERY | Facility: CLINIC | Age: 33
End: 2018-10-24

## 2018-10-24 NOTE — TELEPHONE ENCOUNTER
10/1/18 @ 9:29am Received voicemail from [facility] (no phone number left for callbacks) informing Bariatric Dept that CPT 2727W for date of service 8/6/18 was denied due to services being considered to be Experimental and investigational by insu

## 2018-11-19 DIAGNOSIS — Z30.41 ENCOUNTER FOR BIRTH CONTROL PILLS MAINTENANCE: ICD-10-CM

## 2018-11-20 RX ORDER — NORGESTIMATE AND ETHINYL ESTRADIOL 7DAYSX3 28
KIT ORAL
Qty: 84 TABLET | Refills: 0 | Status: SHIPPED | OUTPATIENT
Start: 2018-11-20 | End: 2019-03-11

## 2018-11-20 NOTE — TELEPHONE ENCOUNTER
Medication(s) to Refill:   Requested Prescriptions     Pending Prescriptions Disp Refills   • TRI-SPRINTEC 0.18/0.215/0.25 MG-35 MCG Oral Tab [Pharmacy Med Name: TRI-SPRINTEC TAB] 84 tablet 0     Sig: TAKE 1 TABLET BY MOUTH ONCE DAILY         Reason for Me

## 2018-12-15 ENCOUNTER — PRIOR ORIGINAL RECORDS (OUTPATIENT)
Dept: OTHER | Age: 33
End: 2018-12-15

## 2018-12-15 ENCOUNTER — LAB ENCOUNTER (OUTPATIENT)
Dept: LAB | Age: 33
End: 2018-12-15
Attending: INTERNAL MEDICINE
Payer: COMMERCIAL

## 2018-12-15 DIAGNOSIS — R06.00 DYSPNEA, PAROXYSMAL NOCTURNAL: Primary | ICD-10-CM

## 2018-12-15 PROCEDURE — 36415 COLL VENOUS BLD VENIPUNCTURE: CPT

## 2018-12-15 PROCEDURE — 80061 LIPID PANEL: CPT

## 2018-12-17 LAB
CHOLESTEROL, TOTAL: 142 MG/DL
HDL CHOLESTEROL: 32 MG/DL
LDL CHOLESTEROL: 82 MG/DL
TRIGLYCERIDES: 141 MG/DL

## 2018-12-27 ENCOUNTER — MYAURORA ACCOUNT LINK (OUTPATIENT)
Dept: OTHER | Age: 33
End: 2018-12-27

## 2018-12-27 ENCOUNTER — PRIOR ORIGINAL RECORDS (OUTPATIENT)
Dept: OTHER | Age: 33
End: 2018-12-27

## 2019-01-06 ENCOUNTER — OFFICE VISIT (OUTPATIENT)
Dept: FAMILY MEDICINE CLINIC | Facility: CLINIC | Age: 34
End: 2019-01-06
Payer: COMMERCIAL

## 2019-01-06 VITALS
TEMPERATURE: 98 F | BODY MASS INDEX: 37.56 KG/M2 | SYSTOLIC BLOOD PRESSURE: 118 MMHG | HEIGHT: 64 IN | OXYGEN SATURATION: 97 % | WEIGHT: 220 LBS | DIASTOLIC BLOOD PRESSURE: 78 MMHG | HEART RATE: 82 BPM

## 2019-01-06 DIAGNOSIS — N30.01 ACUTE CYSTITIS WITH HEMATURIA: ICD-10-CM

## 2019-01-06 DIAGNOSIS — R39.9 UTI SYMPTOMS: Primary | ICD-10-CM

## 2019-01-06 LAB
MULTISTIX LOT#: ABNORMAL NUMERIC
PH, URINE: 6 (ref 4.5–8)
PROTEIN (URINE DIPSTICK): 30 MG/DL
SPECIFIC GRAVITY: 1.02 (ref 1–1.03)
URINE-COLOR: YELLOW
UROBILINOGEN,SEMI-QN: 0.2 MG/DL (ref 0–1.9)

## 2019-01-06 PROCEDURE — 87088 URINE BACTERIA CULTURE: CPT | Performed by: NURSE PRACTITIONER

## 2019-01-06 PROCEDURE — 81003 URINALYSIS AUTO W/O SCOPE: CPT | Performed by: NURSE PRACTITIONER

## 2019-01-06 PROCEDURE — 99213 OFFICE O/P EST LOW 20 MIN: CPT | Performed by: NURSE PRACTITIONER

## 2019-01-06 PROCEDURE — 87186 SC STD MICRODIL/AGAR DIL: CPT | Performed by: NURSE PRACTITIONER

## 2019-01-06 PROCEDURE — 87086 URINE CULTURE/COLONY COUNT: CPT | Performed by: NURSE PRACTITIONER

## 2019-01-06 RX ORDER — NITROFURANTOIN 25; 75 MG/1; MG/1
100 CAPSULE ORAL 2 TIMES DAILY
Qty: 10 CAPSULE | Refills: 0 | Status: SHIPPED | OUTPATIENT
Start: 2019-01-06 | End: 2019-01-11

## 2019-01-06 NOTE — PROGRESS NOTES
Parvez Dorado is a 35year old female. HPI:   Patient presents with symptoms of UTI for 1 days. Complaining of urinary frequency, urgency, dysuria, pos suprapubic pain  Denies back pain, fever, hematuria.   Pt has history of UTI in past. Smokeless tobacco: Never Used      Tobacco comment: 5 cig daily    Alcohol use: Yes      Comment: social    Drug use: No        REVIEW OF SYSTEMS:   GENERAL HEALTH: no  fever/chills or fatigue  SKIN: denies any unusual skin lesions or rashes  RESPIRATO Most UTIs are caused by bacteria, although they may also be caused by viruses or fungi. Bacteria from the bowel are the most common source of infection. The infection may start because of any of the following:  · Sexual activity.  During sex, bacteria can t

## 2019-02-06 ENCOUNTER — MED REC SCAN ONLY (OUTPATIENT)
Dept: FAMILY MEDICINE CLINIC | Facility: CLINIC | Age: 34
End: 2019-02-06

## 2019-02-28 VITALS
DIASTOLIC BLOOD PRESSURE: 74 MMHG | HEIGHT: 64 IN | WEIGHT: 259 LBS | SYSTOLIC BLOOD PRESSURE: 112 MMHG | BODY MASS INDEX: 44.22 KG/M2 | HEART RATE: 68 BPM

## 2019-02-28 VITALS
HEART RATE: 98 BPM | SYSTOLIC BLOOD PRESSURE: 136 MMHG | HEIGHT: 64 IN | WEIGHT: 293 LBS | DIASTOLIC BLOOD PRESSURE: 80 MMHG | BODY MASS INDEX: 50.02 KG/M2

## 2019-02-28 VITALS
BODY MASS INDEX: 37.9 KG/M2 | WEIGHT: 222 LBS | DIASTOLIC BLOOD PRESSURE: 70 MMHG | HEIGHT: 64 IN | SYSTOLIC BLOOD PRESSURE: 110 MMHG | HEART RATE: 85 BPM

## 2019-03-01 VITALS
SYSTOLIC BLOOD PRESSURE: 138 MMHG | DIASTOLIC BLOOD PRESSURE: 80 MMHG | HEART RATE: 92 BPM | HEIGHT: 64 IN | WEIGHT: 293 LBS | BODY MASS INDEX: 50.02 KG/M2

## 2019-03-07 ENCOUNTER — TELEPHONE (OUTPATIENT)
Dept: SURGERY | Facility: CLINIC | Age: 34
End: 2019-03-07

## 2019-03-07 NOTE — TELEPHONE ENCOUNTER
3/4/19 @ 9:15am Spoke to Leonora Richardson at [facility], [phone], AFZ#[phone]. He verified that patient has following benefits for Bariatric services:   • EHV ([facility]#[id number]) DX E66.9 & E66.3   - Dietitian ([id number]) – Yes. No limit,  no prior auth req’d.

## 2019-03-11 DIAGNOSIS — Z30.41 ENCOUNTER FOR BIRTH CONTROL PILLS MAINTENANCE: ICD-10-CM

## 2019-03-11 RX ORDER — NORGESTIMATE AND ETHINYL ESTRADIOL 7DAYSX3 28
KIT ORAL
Qty: 84 TABLET | Refills: 0 | Status: SHIPPED | OUTPATIENT
Start: 2019-03-11 | End: 2019-05-20

## 2019-03-14 ENCOUNTER — OFFICE VISIT (OUTPATIENT)
Dept: SURGERY | Facility: CLINIC | Age: 34
End: 2019-03-14
Payer: COMMERCIAL

## 2019-03-14 VITALS — BODY MASS INDEX: 37.88 KG/M2 | HEIGHT: 64 IN | WEIGHT: 221.88 LBS

## 2019-03-14 DIAGNOSIS — E66.01 CLASS 2 SEVERE OBESITY DUE TO EXCESS CALORIES WITH SERIOUS COMORBIDITY AND BODY MASS INDEX (BMI) OF 38.0 TO 38.9 IN ADULT (HCC): Primary | ICD-10-CM

## 2019-03-14 PROCEDURE — 97803 MED NUTRITION INDIV SUBSEQ: CPT | Performed by: DIETITIAN, REGISTERED

## 2019-03-14 NOTE — PROGRESS NOTES
[address] [place] [address] AND WE[facility] LOSS CLINIC  [address] [place] [address]  Dept: [phone]  Loc: [phone]    03/14/19      Bariatric Follow-up Nutrition Session    Romana Blanks is a 35year old female 12/15/2018    TRIG 141 12/15/2018    HDL 32 (L) 12/15/2018    LDL 82 12/15/2018    VLDL 28 12/15/2018    TCHDLRATIO 4.42 08/05/2016    NONHDLC 110 12/15/2018    CHOLHDLRATIO 5.4 (H) 06/05/2015        Vitamins/Minerals:  Lab Results   Component Value Date steamed veg    Push pop after dinner     Total Calories:  ~1800 kcal per day  Excessive in: calories and simple sugars  Inadequate in:  nothing     Patient has made some modifications and adjustments to diet: yes, prioritizes protein at every eating occasi spent with pt: 30 minutes

## 2019-03-15 ENCOUNTER — LAB ENCOUNTER (OUTPATIENT)
Dept: LAB | Age: 34
End: 2019-03-15
Attending: NURSE PRACTITIONER
Payer: COMMERCIAL

## 2019-03-15 ENCOUNTER — OFFICE VISIT (OUTPATIENT)
Dept: FAMILY MEDICINE CLINIC | Facility: CLINIC | Age: 34
End: 2019-03-15
Payer: COMMERCIAL

## 2019-03-15 VITALS
HEIGHT: 64 IN | RESPIRATION RATE: 15 BRPM | HEART RATE: 88 BPM | BODY MASS INDEX: 38.07 KG/M2 | DIASTOLIC BLOOD PRESSURE: 84 MMHG | WEIGHT: 223 LBS | TEMPERATURE: 99 F | SYSTOLIC BLOOD PRESSURE: 130 MMHG

## 2019-03-15 DIAGNOSIS — Z11.3 SCREEN FOR STD (SEXUALLY TRANSMITTED DISEASE): ICD-10-CM

## 2019-03-15 DIAGNOSIS — Z11.3 SCREEN FOR STD (SEXUALLY TRANSMITTED DISEASE): Primary | ICD-10-CM

## 2019-03-15 LAB
APPEARANCE: CLEAR
HBV SURFACE AB SER QL: REACTIVE
HBV SURFACE AB SERPL IA-ACNC: >1000 MIU/ML
HBV SURFACE AG SER-ACNC: <0.1 [IU]/L
HBV SURFACE AG SERPL QL IA: NONREACTIVE
HCV AB SERPL QL IA: NONREACTIVE
MULTISTIX LOT#: ABNORMAL NUMERIC
PH, URINE: 6 (ref 4.5–8)
SPECIFIC GRAVITY: 1.01 (ref 1–1.03)
T PALLIDUM AB SER QL IA: NONREACTIVE
UROBILINOGEN,SEMI-QN: 0.2 MG/DL (ref 0–1.9)

## 2019-03-15 PROCEDURE — 87340 HEPATITIS B SURFACE AG IA: CPT | Performed by: NURSE PRACTITIONER

## 2019-03-15 PROCEDURE — 86706 HEP B SURFACE ANTIBODY: CPT | Performed by: NURSE PRACTITIONER

## 2019-03-15 PROCEDURE — 81003 URINALYSIS AUTO W/O SCOPE: CPT | Performed by: NURSE PRACTITIONER

## 2019-03-15 PROCEDURE — 87491 CHLMYD TRACH DNA AMP PROBE: CPT | Performed by: NURSE PRACTITIONER

## 2019-03-15 PROCEDURE — 87510 GARDNER VAG DNA DIR PROBE: CPT | Performed by: NURSE PRACTITIONER

## 2019-03-15 PROCEDURE — 86803 HEPATITIS C AB TEST: CPT | Performed by: NURSE PRACTITIONER

## 2019-03-15 PROCEDURE — 36415 COLL VENOUS BLD VENIPUNCTURE: CPT | Performed by: NURSE PRACTITIONER

## 2019-03-15 PROCEDURE — 87389 HIV-1 AG W/HIV-1&-2 AB AG IA: CPT | Performed by: NURSE PRACTITIONER

## 2019-03-15 PROCEDURE — 86780 TREPONEMA PALLIDUM: CPT | Performed by: NURSE PRACTITIONER

## 2019-03-15 PROCEDURE — 99213 OFFICE O/P EST LOW 20 MIN: CPT | Performed by: NURSE PRACTITIONER

## 2019-03-15 PROCEDURE — 87480 CANDIDA DNA DIR PROBE: CPT | Performed by: NURSE PRACTITIONER

## 2019-03-15 PROCEDURE — 87591 N.GONORRHOEAE DNA AMP PROB: CPT | Performed by: NURSE PRACTITIONER

## 2019-03-15 PROCEDURE — 87660 TRICHOMONAS VAGIN DIR PROBE: CPT | Performed by: NURSE PRACTITIONER

## 2019-03-15 RX ORDER — LOSARTAN POTASSIUM 25 MG/1
1 TABLET ORAL EVERY MORNING
Refills: 2 | COMMUNITY
Start: 2019-02-27

## 2019-03-15 RX ORDER — LORATADINE 10 MG/1
TABLET ORAL
COMMUNITY

## 2019-03-15 NOTE — PROGRESS NOTES
HPI:   Samantha Fuentes is a 35year old female who presents  For STD testing         LMP: 3/12/18-still on her menstrual cycle.   Exposure to STDs:new partner in last three months   Past hx of STDs: had Chlamydia  and positive with HPV   Denies any Quit date: 2016        Years since quittin.6      Smokeless tobacco: Never Used    Alcohol use: Yes      Comment: social    Drug use: No       REVIEW OF SYSTEMS:   GENERAL: no fatigue, fever, chills.   SKIN: denies any unusual skin lesions  GI: av

## 2019-03-17 LAB
C TRACH DNA SPEC QL NAA+PROBE: NEGATIVE
N GONORRHOEA DNA SPEC QL NAA+PROBE: NEGATIVE

## 2019-03-18 ENCOUNTER — TELEPHONE (OUTPATIENT)
Dept: FAMILY MEDICINE CLINIC | Facility: CLINIC | Age: 34
End: 2019-03-18

## 2019-03-18 NOTE — TELEPHONE ENCOUNTER
----- Message from SANDRA Li sent at 3/18/2019  7:30 AM CDT -----  Negative and immune to Hepatitis B

## 2019-03-19 RX ORDER — FLUCONAZOLE 150 MG/1
150 TABLET ORAL DAILY
Qty: 2 TABLET | Refills: 0 | Status: SHIPPED | OUTPATIENT
Start: 2019-03-19 | End: 2019-03-21

## 2019-03-19 NOTE — TELEPHONE ENCOUNTER
HIV still pending , swabs was negative for candidiasis or vaginitis we can sent her Diflucan 150 mg x 2 doses if not better will  Sent new swabs

## 2019-03-19 NOTE — TELEPHONE ENCOUNTER
Discussed all labs with patient confirming results. She would like two things:    1. Is the HIV negative  2. She is having some vaginal itching with foul smell she states maybe yeast.  Would like to know if she can have something for this.   [facility] is her

## 2019-03-26 NOTE — TELEPHONE ENCOUNTER
HIV is still marked in process but negative result received. Called [facility] to verify and spoke with [place]. She states that yes the result is final but she is not sure why it is still marked in process.   She states that if appears this way because if

## 2019-04-03 ENCOUNTER — OFFICE VISIT (OUTPATIENT)
Dept: FAMILY MEDICINE CLINIC | Facility: CLINIC | Age: 34
End: 2019-04-03
Payer: COMMERCIAL

## 2019-04-03 VITALS
RESPIRATION RATE: 16 BRPM | HEART RATE: 92 BPM | HEIGHT: 64 IN | OXYGEN SATURATION: 98 % | DIASTOLIC BLOOD PRESSURE: 80 MMHG | SYSTOLIC BLOOD PRESSURE: 122 MMHG | TEMPERATURE: 99 F | BODY MASS INDEX: 37.9 KG/M2 | WEIGHT: 222 LBS

## 2019-04-03 DIAGNOSIS — N76.0 ACUTE VAGINITIS: ICD-10-CM

## 2019-04-03 DIAGNOSIS — R82.90 ABNORMAL URINE ODOR: Primary | ICD-10-CM

## 2019-04-03 PROCEDURE — 81003 URINALYSIS AUTO W/O SCOPE: CPT | Performed by: NURSE PRACTITIONER

## 2019-04-03 PROCEDURE — 87660 TRICHOMONAS VAGIN DIR PROBE: CPT | Performed by: NURSE PRACTITIONER

## 2019-04-03 PROCEDURE — 87086 URINE CULTURE/COLONY COUNT: CPT | Performed by: NURSE PRACTITIONER

## 2019-04-03 PROCEDURE — 99213 OFFICE O/P EST LOW 20 MIN: CPT | Performed by: NURSE PRACTITIONER

## 2019-04-03 PROCEDURE — 87480 CANDIDA DNA DIR PROBE: CPT | Performed by: NURSE PRACTITIONER

## 2019-04-03 PROCEDURE — 87510 GARDNER VAG DNA DIR PROBE: CPT | Performed by: NURSE PRACTITIONER

## 2019-04-03 RX ORDER — FLUCONAZOLE 150 MG/1
150 TABLET ORAL ONCE
Qty: 2 TABLET | Refills: 0 | Status: SHIPPED | OUTPATIENT
Start: 2019-04-03 | End: 2019-04-03

## 2019-04-03 NOTE — PATIENT INSTRUCTIONS
Preventing Vaginitis     Use mild, unscented soap when you bathe or shower to avoid irritating your vagina.     Vaginitis is irritation or infection of the vagina or vulva (the outside opening of the vagina). Vaginitis can be caused by bacteria, viruses, · Don’t sit in wet clothes. Yeast thrives when [employer] warm and damp. · Don’t wear tight pants. And don’t wear tights, leggings, or hose without a cotton crotch. These types of clothing trap warmth and moisture. · Wear cotton underwear.  Cotton lets air circ

## 2019-04-03 NOTE — PROGRESS NOTES
Sherri Marquez is a 35year old female who presents for vaginal discharge. The patient complains of vaginal discharge. Pt has had the sx's for 1 month . Pt has no itching. There is  unusual odor. Denies dysuria.  There is no  a whitish thick di Take by mouth.  Disp:  Rfl:       Past Medical History:   Diagnosis Date   • Anxiety state     seeing psychologist-no meds   • Asthma     exercise induced-no problems for 10 yrs   • Constipation    • Depression     hx of years ago   • H/O gastric bypass 08/ auscultation  CARDIO: RRR without murmur  GI: good BS's,no masses, HSM or tenderness, Beck negative, no guarding, no Psoas sign. No hernias. EXTREMITIES: no edema  NEURO: DTR 2+bilaterally upper and lower extremities.     ASSESSMENT AND PLAN:   Manjula Cox

## 2019-04-23 RX ORDER — MINOCYCLINE HYDROCHLORIDE 100 MG/1
CAPSULE ORAL
COMMUNITY
Start: 2017-12-07 | End: 2019-11-21 | Stop reason: ALTCHOICE

## 2019-04-23 RX ORDER — GLYCOPYRROLATE 1 MG/1
TABLET ORAL
COMMUNITY
Start: 2017-12-07 | End: 2019-11-21 | Stop reason: ALTCHOICE

## 2019-04-23 RX ORDER — ADAPALENE GEL USP, 0.3% 3 MG/G
GEL TOPICAL
COMMUNITY

## 2019-04-23 RX ORDER — LOSARTAN POTASSIUM 25 MG/1
TABLET ORAL
COMMUNITY
Start: 2018-12-27 | End: 2019-08-24 | Stop reason: SDUPTHER

## 2019-05-20 ENCOUNTER — PATIENT MESSAGE (OUTPATIENT)
Dept: FAMILY MEDICINE CLINIC | Facility: CLINIC | Age: 34
End: 2019-05-20

## 2019-05-20 DIAGNOSIS — Z30.41 ENCOUNTER FOR BIRTH CONTROL PILLS MAINTENANCE: ICD-10-CM

## 2019-05-20 RX ORDER — NORGESTIMATE AND ETHINYL ESTRADIOL 7DAYSX3 28
1 KIT ORAL
Qty: 84 TABLET | Refills: 0 | Status: SHIPPED | OUTPATIENT
Start: 2019-05-20 | End: 2019-08-06

## 2019-05-20 NOTE — TELEPHONE ENCOUNTER
From: Mario Patterson  To: SANDRA Hamilton  Sent: 5/20/2019 7:43 AM CDT  Subject: Prescription Question    Good morning! I will be traveling to [facility] in June. I have a question about taking my birth control medication (Tri-Sprintec).  Due t

## 2019-05-20 NOTE — TELEPHONE ENCOUNTER
Medication(s) to Refill:   Requested Prescriptions     Pending Prescriptions Disp Refills   • TRI-SPRINTEC 0.18/0.215/0.25 MG-35 MCG Oral Tab 84 tablet 0     Sig: Take 1 tablet by mouth once daily.          Reason for Medication Refill being sent to Provide

## 2019-06-03 ENCOUNTER — TELEPHONE (OUTPATIENT)
Dept: SURGERY | Facility: CLINIC | Age: 34
End: 2019-06-03

## 2019-06-03 ENCOUNTER — OFFICE VISIT (OUTPATIENT)
Dept: SURGERY | Facility: CLINIC | Age: 34
End: 2019-06-03
Payer: COMMERCIAL

## 2019-06-03 VITALS — HEIGHT: 64 IN | WEIGHT: 222.63 LBS | BODY MASS INDEX: 38.01 KG/M2

## 2019-06-03 DIAGNOSIS — E66.01 CLASS 2 SEVERE OBESITY DUE TO EXCESS CALORIES WITH SERIOUS COMORBIDITY AND BODY MASS INDEX (BMI) OF 38.0 TO 38.9 IN ADULT (HCC): Primary | ICD-10-CM

## 2019-06-03 PROCEDURE — 97803 MED NUTRITION INDIV SUBSEQ: CPT | Performed by: DIETITIAN, REGISTERED

## 2019-06-03 PROCEDURE — 0358T BIA WHOLE BODY: CPT | Performed by: DIETITIAN, REGISTERED

## 2019-06-03 NOTE — PROGRESS NOTES
[address] [place] [address] AND WEIGHT LOSS CLINIC  [address] [place] [address]  Dept: [phone]  Loc: [phone]    06/03/19      Bariatric Follow-up Nutrition Session    Sandy Joel is a 35year old female 12/15/2018    TRIG 141 12/15/2018    HDL 32 (L) 12/15/2018    LDL 82 12/15/2018    VLDL 28 12/15/2018    TCHDLRATIO 4.42 08/05/2016    NONHDLC 110 12/15/2018    CHOLHDLRATIO 5.4 (H) 06/05/2015        Vitamins/Minerals:  Lab Results   Component Value Date Goal weight: 180 lbs    Total Calories:  1100 kcal per day  Excessive in: calories some days?   Inadequate in:  vegetables and fiber     Patient has made some modifications and adjustments to diet: yes, is keeping consistent food records  Food intoleran training    Monitor/Evaluate     Anthropometric measurements, Food/fluid intake/choices, Food intolerances, Activity level, Vitamin/mineral supplementation, Reinforce goals, Calorie/protein intake and Other:  nutrition labs    Roel Ta MS RD JEFFN  F

## 2019-06-06 DIAGNOSIS — E55.9 VITAMIN D DEFICIENCY: ICD-10-CM

## 2019-06-06 DIAGNOSIS — Z98.84 HISTORY OF ROUX-EN-Y GASTRIC BYPASS: ICD-10-CM

## 2019-06-06 DIAGNOSIS — E66.9 OBESITY (BMI 30-39.9): ICD-10-CM

## 2019-06-06 DIAGNOSIS — I10 ESSENTIAL HYPERTENSION, BENIGN: Primary | ICD-10-CM

## 2019-06-06 DIAGNOSIS — R73.03 PREDIABETES: ICD-10-CM

## 2019-06-25 ENCOUNTER — TELEPHONE (OUTPATIENT)
Dept: FAMILY MEDICINE CLINIC | Facility: CLINIC | Age: 34
End: 2019-06-25

## 2019-06-25 DIAGNOSIS — Z78.9 ALLERGY HISTORY UNKNOWN: Primary | ICD-10-CM

## 2019-06-25 NOTE — TELEPHONE ENCOUNTER
Pt called and is wondering if we could order her an allergy test because she want to get a dog but wants to make sure she is not allergic.

## 2019-06-26 ENCOUNTER — LAB ENCOUNTER (OUTPATIENT)
Dept: LAB | Age: 34
End: 2019-06-26
Attending: INTERNAL MEDICINE
Payer: COMMERCIAL

## 2019-06-26 DIAGNOSIS — E55.9 VITAMIN D DEFICIENCY: ICD-10-CM

## 2019-06-26 DIAGNOSIS — I10 ESSENTIAL HYPERTENSION, BENIGN: ICD-10-CM

## 2019-06-26 DIAGNOSIS — E66.9 OBESITY (BMI 30-39.9): ICD-10-CM

## 2019-06-26 DIAGNOSIS — Z98.84 HISTORY OF ROUX-EN-Y GASTRIC BYPASS: ICD-10-CM

## 2019-06-26 DIAGNOSIS — R73.03 PREDIABETES: ICD-10-CM

## 2019-06-26 DIAGNOSIS — Z78.9 ALLERGY HISTORY UNKNOWN: ICD-10-CM

## 2019-06-26 PROCEDURE — 82306 VITAMIN D 25 HYDROXY: CPT | Performed by: INTERNAL MEDICINE

## 2019-06-26 PROCEDURE — 36415 COLL VENOUS BLD VENIPUNCTURE: CPT | Performed by: FAMILY MEDICINE

## 2019-06-26 PROCEDURE — 82607 VITAMIN B-12: CPT | Performed by: INTERNAL MEDICINE

## 2019-06-26 PROCEDURE — 82785 ASSAY OF IGE: CPT | Performed by: FAMILY MEDICINE

## 2019-06-26 PROCEDURE — 84425 ASSAY OF VITAMIN B-1: CPT | Performed by: INTERNAL MEDICINE

## 2019-06-26 PROCEDURE — 85027 COMPLETE CBC AUTOMATED: CPT | Performed by: INTERNAL MEDICINE

## 2019-06-26 PROCEDURE — 80053 COMPREHEN METABOLIC PANEL: CPT | Performed by: INTERNAL MEDICINE

## 2019-06-26 PROCEDURE — 86003 ALLG SPEC IGE CRUDE XTRC EA: CPT | Performed by: FAMILY MEDICINE

## 2019-07-11 ENCOUNTER — OFFICE VISIT (OUTPATIENT)
Dept: SURGERY | Facility: CLINIC | Age: 34
End: 2019-07-11
Payer: COMMERCIAL

## 2019-07-11 ENCOUNTER — APPOINTMENT (OUTPATIENT)
Dept: LAB | Facility: HOSPITAL | Age: 34
End: 2019-07-11
Attending: INTERNAL MEDICINE
Payer: COMMERCIAL

## 2019-07-11 VITALS
HEART RATE: 76 BPM | HEIGHT: 64 IN | SYSTOLIC BLOOD PRESSURE: 133 MMHG | WEIGHT: 221 LBS | DIASTOLIC BLOOD PRESSURE: 65 MMHG | RESPIRATION RATE: 16 BRPM | BODY MASS INDEX: 37.73 KG/M2

## 2019-07-11 DIAGNOSIS — Z51.81 ENCOUNTER FOR THERAPEUTIC DRUG MONITORING: ICD-10-CM

## 2019-07-11 DIAGNOSIS — E66.9 OBESITY (BMI 30-39.9): ICD-10-CM

## 2019-07-11 DIAGNOSIS — I10 ESSENTIAL HYPERTENSION, BENIGN: ICD-10-CM

## 2019-07-11 DIAGNOSIS — I10 ESSENTIAL HYPERTENSION, BENIGN: Primary | ICD-10-CM

## 2019-07-11 DIAGNOSIS — R63.5 WEIGHT GAIN: ICD-10-CM

## 2019-07-11 DIAGNOSIS — Z98.84 HISTORY OF ROUX-EN-Y GASTRIC BYPASS: ICD-10-CM

## 2019-07-11 PROCEDURE — 99214 OFFICE O/P EST MOD 30 MIN: CPT | Performed by: INTERNAL MEDICINE

## 2019-07-11 PROCEDURE — 93005 ELECTROCARDIOGRAM TRACING: CPT

## 2019-07-11 PROCEDURE — 93010 ELECTROCARDIOGRAM REPORT: CPT | Performed by: INTERNAL MEDICINE

## 2019-07-11 RX ORDER — PHENTERMINE HYDROCHLORIDE 37.5 MG/1
37.5 TABLET ORAL
Qty: 30 TABLET | Refills: 2 | Status: SHIPPED | OUTPATIENT
Start: 2019-07-11 | End: 2019-10-10 | Stop reason: DRUGHIGH

## 2019-07-11 NOTE — PROGRESS NOTES
[address] [place] [address],[address]  Dept: [phone]    Date: 2018    Patient:  Akua Haro  :      1985  MRN:      [id number]    Referring Pr Quit date: 2016        Years since quittin.9      Smokeless tobacco: Never Used    Alcohol use: Yes      Comment: social    Drug use: No    Surgical History:    Past Surgical History:   Procedure Laterality Date   • ANKLE FRACTURE SURGERY  200 on the above medications. No interval change in antihypertensive medication.      KYLE: improved with weight loss    Doing well    Blood work done  Continue pro biotic and bariatric mvi    Will restart phentermine  37.5 mg   Should start with half tablet in

## 2019-07-22 ENCOUNTER — HOSPITAL ENCOUNTER (OUTPATIENT)
Dept: ULTRASOUND IMAGING | Age: 34
Discharge: HOME | End: 2019-07-22
Attending: INTERNAL MEDICINE
Payer: COMMERCIAL

## 2019-07-22 DIAGNOSIS — D49.7 ADRENAL TUMOR: ICD-10-CM

## 2019-07-22 DIAGNOSIS — E27.8 ADRENAL NODULE (HCC): ICD-10-CM

## 2019-07-22 PROCEDURE — 76536 US EXAM OF HEAD AND NECK: CPT | Performed by: INTERNAL MEDICINE

## 2019-07-30 ENCOUNTER — OFFICE VISIT (OUTPATIENT)
Dept: SURGERY | Facility: CLINIC | Age: 34
End: 2019-07-30
Payer: COMMERCIAL

## 2019-07-30 VITALS
OXYGEN SATURATION: 99 % | HEIGHT: 64 IN | HEART RATE: 80 BPM | SYSTOLIC BLOOD PRESSURE: 132 MMHG | WEIGHT: 215.63 LBS | TEMPERATURE: 99 F | BODY MASS INDEX: 36.81 KG/M2 | DIASTOLIC BLOOD PRESSURE: 76 MMHG

## 2019-07-30 RX ORDER — METRONIDAZOLE 7.5 MG/G
GEL TOPICAL EVERY OTHER DAY
COMMUNITY
End: 2019-08-22

## 2019-07-30 RX ORDER — ADAPALENE 3 MG/G
GEL TOPICAL AS NEEDED
COMMUNITY
End: 2021-07-19

## 2019-07-30 NOTE — PROGRESS NOTES
[address], [address] [facility] [address] [facility] [address]  Dept: [phone]    7/30/2019    BARIATRIC EXISTING PATIENT/FOLLOW UP    HPI:  Kerry Micaela is a 35year [de-identified] old

## 2019-08-05 ENCOUNTER — TELEPHONE (OUTPATIENT)
Dept: SURGERY | Facility: CLINIC | Age: 34
End: 2019-08-05

## 2019-08-06 ENCOUNTER — OFFICE VISIT (OUTPATIENT)
Dept: FAMILY MEDICINE CLINIC | Facility: CLINIC | Age: 34
End: 2019-08-06
Payer: COMMERCIAL

## 2019-08-06 VITALS
OXYGEN SATURATION: 99 % | HEIGHT: 64 IN | BODY MASS INDEX: 36.98 KG/M2 | DIASTOLIC BLOOD PRESSURE: 72 MMHG | RESPIRATION RATE: 18 BRPM | WEIGHT: 216.63 LBS | HEART RATE: 87 BPM | TEMPERATURE: 99 F | SYSTOLIC BLOOD PRESSURE: 122 MMHG

## 2019-08-06 DIAGNOSIS — N30.00 ACUTE CYSTITIS WITHOUT HEMATURIA: ICD-10-CM

## 2019-08-06 DIAGNOSIS — R39.9 UTI SYMPTOMS: Primary | ICD-10-CM

## 2019-08-06 DIAGNOSIS — Z30.41 ENCOUNTER FOR BIRTH CONTROL PILLS MAINTENANCE: ICD-10-CM

## 2019-08-06 LAB
MULTISTIX LOT#: NORMAL NUMERIC
PH, URINE: 6 (ref 4.5–8)
SPECIFIC GRAVITY: 1 (ref 1–1.03)
URINE-COLOR: YELLOW
UROBILINOGEN,SEMI-QN: 0.2 MG/DL (ref 0–1.9)

## 2019-08-06 PROCEDURE — 87186 SC STD MICRODIL/AGAR DIL: CPT | Performed by: NURSE PRACTITIONER

## 2019-08-06 PROCEDURE — 99213 OFFICE O/P EST LOW 20 MIN: CPT | Performed by: NURSE PRACTITIONER

## 2019-08-06 PROCEDURE — 81003 URINALYSIS AUTO W/O SCOPE: CPT | Performed by: NURSE PRACTITIONER

## 2019-08-06 PROCEDURE — 87077 CULTURE AEROBIC IDENTIFY: CPT | Performed by: NURSE PRACTITIONER

## 2019-08-06 PROCEDURE — 87086 URINE CULTURE/COLONY COUNT: CPT | Performed by: NURSE PRACTITIONER

## 2019-08-06 RX ORDER — NITROFURANTOIN 25; 75 MG/1; MG/1
100 CAPSULE ORAL 2 TIMES DAILY
Qty: 14 CAPSULE | Refills: 0 | Status: SHIPPED | OUTPATIENT
Start: 2019-08-06 | End: 2019-08-13

## 2019-08-06 RX ORDER — NORGESTIMATE AND ETHINYL ESTRADIOL 7DAYSX3 28
1 KIT ORAL
Qty: 84 TABLET | Refills: 0 | Status: SHIPPED | OUTPATIENT
Start: 2019-08-06 | End: 2019-11-19

## 2019-08-06 NOTE — PROGRESS NOTES
CHIEF COMPLAINT:   Patient presents with:  Burning On Urination: frequency, urgency, cloudy x yesterday      HPI:   Dalia Marie is a 35year old female who presents with symptoms of UTI.  Complaining of urinary frequency, dysuria, cloudy stro Smokeless tobacco: Never Used    Alcohol use: Yes      Frequency: 2-4 times a month      Drinks per session: 1 or 2      Comment: social    Drug use: No        REVIEW OF SYSTEMS:   GENERAL: Denies fever, chills, or body aches  SKIN: no rashes, no skin Sig: Take 1 capsule (100 mg total) by mouth 2 (two) times daily for 7 days. Risk and benefits of medication discussed. Stressed importance of completing full course of antibiotic unless told otherwise.      Patient Instructions       Understanding U The patient is asked to return in 3 days if not better. Call if fever, vomiting, worsening symptoms.

## 2019-08-08 ENCOUNTER — TELEPHONE (OUTPATIENT)
Dept: FAMILY MEDICINE CLINIC | Facility: CLINIC | Age: 34
End: 2019-08-08

## 2019-08-08 NOTE — TELEPHONE ENCOUNTER
Pt calling stating that she saw her test results on mychart and wanted clarification. Explained UA and culture results to pt. Advised to continue macrobid as it will work to treat her urinary tract infection. Pt verbalized understanding.

## 2019-08-19 ENCOUNTER — OFFICE VISIT (OUTPATIENT)
Dept: FAMILY MEDICINE CLINIC | Facility: CLINIC | Age: 34
End: 2019-08-19
Payer: COMMERCIAL

## 2019-08-19 VITALS
HEIGHT: 64 IN | SYSTOLIC BLOOD PRESSURE: 126 MMHG | OXYGEN SATURATION: 98 % | RESPIRATION RATE: 16 BRPM | DIASTOLIC BLOOD PRESSURE: 72 MMHG | HEART RATE: 101 BPM | WEIGHT: 217.81 LBS | TEMPERATURE: 99 F | BODY MASS INDEX: 37.19 KG/M2

## 2019-08-19 DIAGNOSIS — J06.9 VIRAL UPPER RESPIRATORY TRACT INFECTION: Primary | ICD-10-CM

## 2019-08-19 PROCEDURE — 99213 OFFICE O/P EST LOW 20 MIN: CPT | Performed by: FAMILY MEDICINE

## 2019-08-19 RX ORDER — CEFDINIR 300 MG/1
300 CAPSULE ORAL 2 TIMES DAILY
Qty: 20 CAPSULE | Refills: 0 | Status: SHIPPED | OUTPATIENT
Start: 2019-08-19 | End: 2019-08-22

## 2019-08-19 NOTE — PROGRESS NOTES
CHIEF COMPLAINT:   Patient presents with:  Cough: nasal congestion, coughing, ear pain, sore throat,chills x3 days       HPI:   Judith Watts is a 35year old female who presents for sinus congestion for  3  days.  Symptoms have been worsening si History:   Procedure Laterality Date   • ANKLE FRACTURE SURGERY  2008    Rt.     • BARIATRIC GASTRIC BYPASS LAPAROSCOPIC MOE  N-Y N/A 8/21/2017    Performed by Maggy Xiong MD at [facility] OR   • GASTRIC BYPASS,OBESE<100CM MOE-EN-Y  08/21/2017   • LAS rhonchi. Breathing is non labored. CARDIO: RRR without murmur  EXTREMITIES: no cyanosis, clubbing or edema  LYMPH:  Mild ac/tonsillar lymphadenopathy.     NEURO:  No focal deficits      ASSESSMENT AND PLAN:   Viral upper respiratory tract infection  (prima indicates understanding of these issues and agrees to the plan.

## 2019-08-19 NOTE — PATIENT INSTRUCTIONS
Hold antibiotics for now. Most viral illnesses get worse for the first 3-5 days, then plateau and improve gradually over the next 3-5 days. Monitor symptoms for now.    Use otc meds for comfort as needed--  Continue flonase   Consider adding saline nasa

## 2019-08-22 ENCOUNTER — OFFICE VISIT (OUTPATIENT)
Dept: FAMILY MEDICINE CLINIC | Facility: CLINIC | Age: 34
End: 2019-08-22
Payer: COMMERCIAL

## 2019-08-22 VITALS
BODY MASS INDEX: 37.05 KG/M2 | WEIGHT: 217 LBS | OXYGEN SATURATION: 98 % | SYSTOLIC BLOOD PRESSURE: 122 MMHG | HEART RATE: 84 BPM | DIASTOLIC BLOOD PRESSURE: 84 MMHG | HEIGHT: 64 IN | RESPIRATION RATE: 16 BRPM

## 2019-08-22 DIAGNOSIS — N63.15 BREAST LUMP ON RIGHT SIDE AT 9 O'CLOCK POSITION: ICD-10-CM

## 2019-08-22 DIAGNOSIS — H65.91 RIGHT NON-SUPPURATIVE OTITIS MEDIA: ICD-10-CM

## 2019-08-22 DIAGNOSIS — Z00.00 ROUTINE GENERAL MEDICAL EXAMINATION AT A HEALTH CARE FACILITY: Primary | ICD-10-CM

## 2019-08-22 DIAGNOSIS — J01.10 ACUTE NON-RECURRENT FRONTAL SINUSITIS: ICD-10-CM

## 2019-08-22 PROCEDURE — 99395 PREV VISIT EST AGE 18-39: CPT | Performed by: NURSE PRACTITIONER

## 2019-08-22 RX ORDER — PREDNISONE 20 MG/1
40 TABLET ORAL DAILY
Qty: 10 TABLET | Refills: 0 | Status: SHIPPED | OUTPATIENT
Start: 2019-08-22 | End: 2019-08-27

## 2019-08-22 NOTE — PROGRESS NOTES
Lashae Trinidad is a 35year old female who presents for a complete physical exam, no gyn. HPI:     Patient presents with:  Physical      Patient  Is 33y female presents with comprehensive physical. Patient has sins and symptoms of sinus.  Sinus Obesity, unspecified    • Unspecified sleep apnea     AHI 5 c-pap   • Visual impairment     lasik 6/17 - no glasses      Past Surgical History:   Procedure Laterality Date   • ANKLE FRACTURE SURGERY  2008    Rt.     • BARIATRIC GASTRIC BYPASS LAPAROSCOPIC R distress. HEENT: PERRL and EOMI. Nose: patent swollen turbinates, Ears: R ear TM 's erythematous , non-bulging. TM's fluid behind both. Neck is supple, with no cervical LAD or thyroid abnormalities.    Lungs: are clear to auscultation bilaterally, wit The patient indicates understanding of these issues and agrees to the plan. The patient is asked to return for CPX in 1 year.

## 2019-08-24 ENCOUNTER — TELEPHONE (OUTPATIENT)
Dept: CARDIOLOGY | Age: 34
End: 2019-08-24

## 2019-08-26 ENCOUNTER — TELEPHONE (OUTPATIENT)
Dept: FAMILY MEDICINE CLINIC | Facility: CLINIC | Age: 34
End: 2019-08-26

## 2019-08-26 DIAGNOSIS — N63.15 BREAST LUMP ON RIGHT SIDE AT 9 O'CLOCK POSITION: Primary | ICD-10-CM

## 2019-08-26 RX ORDER — LOSARTAN POTASSIUM 25 MG/1
TABLET ORAL
Qty: 90 TABLET | Refills: 0 | Status: SHIPPED | OUTPATIENT
Start: 2019-08-26 | End: 2019-08-27 | Stop reason: SDUPTHER

## 2019-08-26 NOTE — TELEPHONE ENCOUNTER
Fabi Smith is calling to clarify order of mammogram. Order should be a diagnostic mammogram? Please contact Marisol once correct and she will contact patient.

## 2019-08-26 NOTE — TELEPHONE ENCOUNTER
Vipin Hidalgo called &  Diagnostic Additional views Tori needs to be changed to Diagnostic since pt has not had previous Tori.  Order changed in [address]

## 2019-08-27 RX ORDER — LOSARTAN POTASSIUM 25 MG/1
25 TABLET ORAL DAILY
Qty: 90 TABLET | Refills: 0 | Status: SHIPPED | OUTPATIENT
Start: 2019-08-27 | End: 2020-05-21

## 2019-09-03 ENCOUNTER — TELEPHONE (OUTPATIENT)
Dept: SURGERY | Facility: CLINIC | Age: 34
End: 2019-09-03

## 2019-09-05 ENCOUNTER — APPOINTMENT (OUTPATIENT)
Dept: LAB | Age: 34
End: 2019-09-05
Attending: INTERNAL MEDICINE
Payer: COMMERCIAL

## 2019-09-05 DIAGNOSIS — I10 ESSENTIAL HYPERTENSION, BENIGN: ICD-10-CM

## 2019-09-05 DIAGNOSIS — D49.7 ADRENAL TUMOR: ICD-10-CM

## 2019-09-05 DIAGNOSIS — E04.1 NODULAR THYROID DISEASE: ICD-10-CM

## 2019-09-05 LAB
T4 FREE SERPL-MCNC: 1.1 NG/DL (ref 0.8–1.7)
TSI SER-ACNC: 0.2 MIU/ML (ref 0.36–3.74)

## 2019-09-05 PROCEDURE — 84443 ASSAY THYROID STIM HORMONE: CPT

## 2019-09-05 PROCEDURE — 36415 COLL VENOUS BLD VENIPUNCTURE: CPT

## 2019-09-05 PROCEDURE — 84439 ASSAY OF FREE THYROXINE: CPT

## 2019-09-09 ENCOUNTER — OFFICE VISIT (OUTPATIENT)
Dept: FAMILY MEDICINE CLINIC | Facility: CLINIC | Age: 34
End: 2019-09-09
Payer: COMMERCIAL

## 2019-09-09 ENCOUNTER — HOSPITAL ENCOUNTER (OUTPATIENT)
Dept: MAMMOGRAPHY | Age: 34
Discharge: HOME | End: 2019-09-09
Attending: NURSE PRACTITIONER
Payer: COMMERCIAL

## 2019-09-09 ENCOUNTER — HOSPITAL ENCOUNTER (OUTPATIENT)
Dept: ULTRASOUND IMAGING | Age: 34
Discharge: HOME | End: 2019-09-09
Attending: NURSE PRACTITIONER
Payer: COMMERCIAL

## 2019-09-09 VITALS
DIASTOLIC BLOOD PRESSURE: 70 MMHG | BODY MASS INDEX: 36.96 KG/M2 | TEMPERATURE: 98 F | HEIGHT: 64 IN | WEIGHT: 216.5 LBS | OXYGEN SATURATION: 98 % | SYSTOLIC BLOOD PRESSURE: 120 MMHG | HEART RATE: 92 BPM | RESPIRATION RATE: 16 BRPM

## 2019-09-09 DIAGNOSIS — N63.15 BREAST LUMP ON RIGHT SIDE AT 9 O'CLOCK POSITION: ICD-10-CM

## 2019-09-09 DIAGNOSIS — N39.0 RECURRENT UTI: ICD-10-CM

## 2019-09-09 DIAGNOSIS — Z11.3 SCREENING EXAMINATION FOR STD (SEXUALLY TRANSMITTED DISEASE): Primary | ICD-10-CM

## 2019-09-09 DIAGNOSIS — Z28.21 INFLUENZA VACCINATION DECLINED BY PATIENT: ICD-10-CM

## 2019-09-09 DIAGNOSIS — Z23 NEED FOR VACCINATION: ICD-10-CM

## 2019-09-09 DIAGNOSIS — Z13.31 NEGATIVE DEPRESSION SCREENING: ICD-10-CM

## 2019-09-09 PROBLEM — R87.619 ABNORMAL PAP SMEAR OF CERVIX: Status: ACTIVE | Noted: 2017-12-06

## 2019-09-09 LAB
BILIRUBIN URINE: NEGATIVE
BLOOD URINE: NEGATIVE
CONTROL RUN WITHIN 24 HOURS?: YES
GLUCOSE URINE: NEGATIVE
KETONE URINE: NEGATIVE
LEUKOCYTE ESTERASE URINE: NEGATIVE
NITRITE URINE: NEGATIVE
PH URINE: 6 (ref 5–8)
PROTEIN URINE: NEGATIVE
SPEC GRAVITY: 1.01 (ref 1–1.03)
URINE CLARITY: CLEAR
URINE COLOR: YELLOW
UROBILINOGEN URINE: 0.2

## 2019-09-09 PROCEDURE — 99213 OFFICE O/P EST LOW 20 MIN: CPT | Performed by: FAMILY MEDICINE

## 2019-09-09 PROCEDURE — 77065 DX MAMMO INCL CAD UNI: CPT | Performed by: NURSE PRACTITIONER

## 2019-09-09 PROCEDURE — 87591 N.GONORRHOEAE DNA AMP PROB: CPT | Performed by: FAMILY MEDICINE

## 2019-09-09 PROCEDURE — 87491 CHLMYD TRACH DNA AMP PROBE: CPT | Performed by: FAMILY MEDICINE

## 2019-09-09 PROCEDURE — 76642 ULTRASOUND BREAST LIMITED: CPT | Performed by: NURSE PRACTITIONER

## 2019-09-09 PROCEDURE — 77061 BREAST TOMOSYNTHESIS UNI: CPT | Performed by: NURSE PRACTITIONER

## 2019-09-09 RX ORDER — NITROFURANTOIN 25; 75 MG/1; MG/1
CAPSULE ORAL
Qty: 30 CAPSULE | Refills: 0 | Status: SHIPPED | OUTPATIENT
Start: 2019-09-09 | End: 2020-04-13

## 2019-09-09 NOTE — PROGRESS NOTES
HPI:   Lawyer Coello is a 35year old female that presents to establish care. She is UTD on health maintenance. Patient declined flu vaccine. She would like all STD screening. She states she doesn't have any symptoms.  She states she does i 25 MG Oral Tab, Take 1 tablet by mouth every morning., Disp: , Rfl: 2  •  loratadine 10 MG Oral Tab, Take by mouth., Disp: , Rfl:   •  Probiotic Product (PROBIOTIC DAILY OR), Take by mouth., Disp: , Rfl:   •  Calcium 500-125 MG-UNIT Oral Tab, Take by mouth POCT URINALYSIS DIPSTICK neg  - will start macrobid ppx with intercourse, d/w pt in detail  - continue good hygeine and hydration  - fu PRN    3. Negative depression screening    4.  Influenza vaccination declined by patient      Risks, benefits, and altern

## 2019-09-10 LAB
C TRACH DNA SPEC QL NAA+PROBE: NEGATIVE
N GONORRHOEA DNA SPEC QL NAA+PROBE: NEGATIVE

## 2019-09-14 ENCOUNTER — LAB ENCOUNTER (OUTPATIENT)
Dept: LAB | Age: 34
End: 2019-09-14
Attending: FAMILY MEDICINE
Payer: COMMERCIAL

## 2019-09-14 DIAGNOSIS — Z11.3 SCREENING EXAMINATION FOR STD (SEXUALLY TRANSMITTED DISEASE): ICD-10-CM

## 2019-09-14 LAB
HBV SURFACE AG SER-ACNC: 0.15 [IU]/L
HBV SURFACE AG SERPL QL IA: NONREACTIVE
HCV AB SERPL QL IA: NONREACTIVE
T PALLIDUM AB SER QL IA: NONREACTIVE

## 2019-09-14 PROCEDURE — 87340 HEPATITIS B SURFACE AG IA: CPT | Performed by: FAMILY MEDICINE

## 2019-09-14 PROCEDURE — 86803 HEPATITIS C AB TEST: CPT | Performed by: FAMILY MEDICINE

## 2019-09-14 PROCEDURE — 87389 HIV-1 AG W/HIV-1&-2 AB AG IA: CPT | Performed by: FAMILY MEDICINE

## 2019-09-14 PROCEDURE — 86780 TREPONEMA PALLIDUM: CPT | Performed by: FAMILY MEDICINE

## 2019-09-14 PROCEDURE — 36415 COLL VENOUS BLD VENIPUNCTURE: CPT | Performed by: FAMILY MEDICINE

## 2019-10-03 ENCOUNTER — OFFICE VISIT (OUTPATIENT)
Dept: SURGERY | Facility: CLINIC | Age: 34
End: 2019-10-03
Payer: COMMERCIAL

## 2019-10-03 VITALS — HEIGHT: 64 IN | WEIGHT: 214.13 LBS | BODY MASS INDEX: 36.56 KG/M2

## 2019-10-03 DIAGNOSIS — E66.01 CLASS 2 SEVERE OBESITY DUE TO EXCESS CALORIES WITH SERIOUS COMORBIDITY AND BODY MASS INDEX (BMI) OF 36.0 TO 36.9 IN ADULT (HCC): Primary | ICD-10-CM

## 2019-10-03 PROCEDURE — 97803 MED NUTRITION INDIV SUBSEQ: CPT | Performed by: DIETITIAN, REGISTERED

## 2019-10-03 NOTE — PROGRESS NOTES
[address] [place] [address] AND WEIGHT LOSS CLINIC  [address] [place] [address]  Dept: [phone]  Loc: [phone]    10/03/19      Bariatric Follow-up Nutrition Session    Jorge Berry is a 35year old female Component Value Date    CHOLEST 142 12/15/2018    TRIG 141 12/15/2018    HDL 32 (L) 12/15/2018    LDL 82 12/15/2018    VLDL 28 12/15/2018    TCHDLRATIO 4.42 08/05/2016    NONHDLC 110 12/15/2018    CHOLHDLRATIO 5.4 (H) 06/05/2015        Vitamins/Minerals: kg)  08/06/19 : 216 lb 9.6 oz (98.2 kg)  07/30/19 : 215 lb 9.6 oz (97.8 kg)      Weight change:  -8 lbs since last RD visit  Post-Op Excess Body Weight Loss: 56.3% EBWL  BMI: Body mass index is 36.75 kg/m².     Protein Intake: 70+ grams/day  Fluid intake: 6 exercise    Monitor/Evaluate     Anthropometric measurements, Food/fluid intake/choices, Food intolerances, Activity level, Vitamin/mineral supplementation, Reinforce goals, Calorie/protein intake and Other:  annual nutrition labs    Shanon Sprague MS RD

## 2019-10-10 ENCOUNTER — OFFICE VISIT (OUTPATIENT)
Dept: SURGERY | Facility: CLINIC | Age: 34
End: 2019-10-10
Payer: COMMERCIAL

## 2019-10-10 VITALS
SYSTOLIC BLOOD PRESSURE: 118 MMHG | DIASTOLIC BLOOD PRESSURE: 84 MMHG | HEART RATE: 91 BPM | WEIGHT: 214 LBS | BODY MASS INDEX: 36.54 KG/M2 | HEIGHT: 64 IN

## 2019-10-10 DIAGNOSIS — E66.9 OBESITY (BMI 30-39.9): ICD-10-CM

## 2019-10-10 DIAGNOSIS — I10 ESSENTIAL HYPERTENSION, BENIGN: Primary | ICD-10-CM

## 2019-10-10 DIAGNOSIS — Z98.84 HISTORY OF ROUX-EN-Y GASTRIC BYPASS: ICD-10-CM

## 2019-10-10 DIAGNOSIS — Z51.81 ENCOUNTER FOR THERAPEUTIC DRUG MONITORING: ICD-10-CM

## 2019-10-10 DIAGNOSIS — R73.03 PREDIABETES: ICD-10-CM

## 2019-10-10 PROCEDURE — 99214 OFFICE O/P EST MOD 30 MIN: CPT | Performed by: INTERNAL MEDICINE

## 2019-10-10 RX ORDER — PHENTERMINE HYDROCHLORIDE 37.5 MG/1
TABLET ORAL
Qty: 45 TABLET | Refills: 2 | Status: SHIPPED | OUTPATIENT
Start: 2019-10-10 | End: 2019-12-30

## 2019-10-10 NOTE — PROGRESS NOTES
[address], [address]  Dept: [phone]      Patient:  Akua Haro  :      1985  MRN:      [id number]    Referring Provider: Junaid Michelle 500-125 MG-UNIT Oral Tab, Take by mouth., Disp: , Rfl:   •  Multiple Vitamins-Minerals (MULTIVITAMIN ADULT OR), Take by mouth 2 (two) times daily. , Disp: , Rfl:   •  Biotin 52086 MCG Oral Tab, Take by mouth., Disp: , Rfl:     Allergies:  Penicillin G; Sulf negative  Gastrointestinal: positive for constipation  Musculoskeletal:negative  Neurological: negative  Behavioral/Psych: negative  Endocrine: negative  All other systems were reviewed and are negative    Assessment       Encounter Diagnosis(ses):   Michael

## 2019-11-17 DIAGNOSIS — Z30.41 ENCOUNTER FOR BIRTH CONTROL PILLS MAINTENANCE: ICD-10-CM

## 2019-11-18 ENCOUNTER — TELEPHONE (OUTPATIENT)
Dept: FAMILY MEDICINE CLINIC | Facility: CLINIC | Age: 34
End: 2019-11-18

## 2019-11-18 DIAGNOSIS — Z30.41 ENCOUNTER FOR BIRTH CONTROL PILLS MAINTENANCE: ICD-10-CM

## 2019-11-18 RX ORDER — NORGESTIMATE AND ETHINYL ESTRADIOL 7DAYSX3 28
1 KIT ORAL
Qty: 84 TABLET | Refills: 0 | OUTPATIENT
Start: 2019-11-18

## 2019-11-18 NOTE — TELEPHONE ENCOUNTER
Pt requesting refill on Tri-Sprintec. Last filled by old PCP 8/6/19 #84 with 0 refills.     Last OV 9/9/19 Establish care/Physical    Rx pended for approval/denial

## 2019-11-18 NOTE — TELEPHONE ENCOUNTER
Pt will need new prescription for TriSprintec sent to [address] [place] [address]. Previous PCP filled this. Pt is completely out of medication.

## 2019-11-19 ENCOUNTER — OFFICE VISIT (OUTPATIENT)
Dept: FAMILY MEDICINE CLINIC | Facility: CLINIC | Age: 34
End: 2019-11-19
Payer: COMMERCIAL

## 2019-11-19 VITALS
DIASTOLIC BLOOD PRESSURE: 70 MMHG | RESPIRATION RATE: 16 BRPM | TEMPERATURE: 98 F | HEART RATE: 88 BPM | SYSTOLIC BLOOD PRESSURE: 122 MMHG | WEIGHT: 217 LBS | HEIGHT: 64 IN | BODY MASS INDEX: 37.05 KG/M2

## 2019-11-19 DIAGNOSIS — L65.9 HAIR LOSS: Primary | ICD-10-CM

## 2019-11-19 PROCEDURE — 99213 OFFICE O/P EST LOW 20 MIN: CPT | Performed by: FAMILY MEDICINE

## 2019-11-19 RX ORDER — NORGESTIMATE AND ETHINYL ESTRADIOL 7DAYSX3 28
1 KIT ORAL
Qty: 3 PACKAGE | Refills: 3 | Status: SHIPPED | OUTPATIENT
Start: 2019-11-19 | End: 2020-09-01

## 2019-11-19 RX ORDER — NORGESTIMATE AND ETHINYL ESTRADIOL 7DAYSX3 28
1 KIT ORAL
Qty: 84 TABLET | Refills: 0 | OUTPATIENT
Start: 2019-11-19

## 2019-11-19 NOTE — PROGRESS NOTES
HPI:   Cecelia Parker is a 35year old female that presents for several months of hair loss. States she feels a lot of stress. Notices shedding in the shower. No scalp itching, lesions or bald patches. No new products. Wears hair loose.   Hx o capsule, Rfl: 0  •  Adapalene 0.3 % External Gel, as directed, Disp: , Rfl:   •  Losartan Potassium 25 MG Oral Tab, Take 1 tablet by mouth every morning., Disp: , Rfl: 2  •  loratadine 10 MG Oral Tab, Take by mouth., Disp: , Rfl:   •  Probiotic Product ([place] current treatment plan discussed in detail. Questions and concerns addressed. Red flags to RTC or ED reviewed. Patient (or parent) agrees to plan. Return if symptoms worsen or fail to improve.     Sherman Edmondson DO  Family Medicine   11/19/2019  5:

## 2019-11-19 NOTE — TELEPHONE ENCOUNTER
Medication(s) to Refill:   Requested Prescriptions     Pending Prescriptions Disp Refills   • TRI-SPRINTEC 0.18/0.215/0.25 MG-35 MCG Oral Tab [Pharmacy Med Name: TRI-SPRINTEC TAB] 84 tablet 0     Sig: TAKE 1 TABLET BY MOUTH ONCE DAILY             [facility]

## 2019-11-21 ENCOUNTER — LAB ENCOUNTER (OUTPATIENT)
Dept: LAB | Age: 34
End: 2019-11-21
Attending: FAMILY MEDICINE
Payer: COMMERCIAL

## 2019-11-21 ENCOUNTER — OFFICE VISIT (OUTPATIENT)
Dept: CARDIOLOGY | Age: 34
End: 2019-11-21

## 2019-11-21 VITALS
HEART RATE: 64 BPM | HEIGHT: 64 IN | WEIGHT: 216 LBS | SYSTOLIC BLOOD PRESSURE: 120 MMHG | DIASTOLIC BLOOD PRESSURE: 80 MMHG | BODY MASS INDEX: 36.88 KG/M2

## 2019-11-21 DIAGNOSIS — R73.01 IMPAIRED FASTING GLUCOSE: ICD-10-CM

## 2019-11-21 DIAGNOSIS — L65.9 HAIR LOSS: ICD-10-CM

## 2019-11-21 DIAGNOSIS — I34.0 NON-RHEUMATIC MITRAL REGURGITATION: ICD-10-CM

## 2019-11-21 DIAGNOSIS — E78.2 HYPERLIPIDEMIA, MIXED: ICD-10-CM

## 2019-11-21 DIAGNOSIS — I10 HYPERTENSION, BENIGN: Primary | ICD-10-CM

## 2019-11-21 LAB
ABSOLUTE IMMATURE GRANULOCYTES (OFFPRE24): NORMAL
ALBUMIN SERPL-MCNC: 3.4 G/DL
ALBUMIN/GLOB SERPL: NORMAL {RATIO}
ALP SERPL-CCNC: 57 U/L
ALT SERPL-CCNC: 19 U/L
ANION GAP SERPL CALC-SCNC: NORMAL MMOL/L
AST SERPL-CCNC: 20 U/L
BASO+EOS+MONOS # BLD: NORMAL 10*3/UL
BASO+EOS+MONOS NFR BLD: NORMAL %
BASOPHILS # BLD: NORMAL 10*3/UL
BASOPHILS NFR BLD: NORMAL %
BILIRUB SERPL-MCNC: 0.4 MG/DL
BUN SERPL-MCNC: 13 MG/DL
BUN/CREAT SERPL: NORMAL
CALCIUM SERPL-MCNC: 8.9 MG/DL
CHLORIDE SERPL-SCNC: 105 MMOL/L
CO2 SERPL-SCNC: NORMAL MMOL/L
CREAT SERPL-MCNC: 0.78 MG/DL
DIFFERENTIAL METHOD BLD: NORMAL
EOSINOPHIL # BLD: NORMAL 10*3/UL
EOSINOPHIL NFR BLD: NORMAL %
ERYTHROCYTE [DISTWIDTH] IN BLOOD: NORMAL %
EST. AVERAGE GLUCOSE BLD GHB EST-MCNC: NORMAL MG/DL
FERRITIN SERPL-MCNC: 5.9 NG/ML
GLOBULIN SER-MCNC: 3.8 G/DL
GLUCOSE SERPL-MCNC: 111 MG/DL
HBA1C MFR BLD: 5.2 %
HBA1C MFR BLD: NORMAL % (ref 4.5–5.6)
HCT VFR BLD CALC: 37.3 %
HGB BLD-MCNC: 12.3 G/DL
IMMATURE GRANULOCYTES (OFFPRE25): NORMAL
LENGTH OF FAST TIME PATIENT: NORMAL H
LYMPHOCYTES # BLD: NORMAL 10*3/UL
LYMPHOCYTES NFR BLD: NORMAL %
MCH RBC QN AUTO: NORMAL PG
MCHC RBC AUTO-ENTMCNC: NORMAL G/DL
MCV RBC AUTO: NORMAL FL
MONOCYTES # BLD: NORMAL 10*3/UL
MONOCYTES NFR BLD: NORMAL %
MPV (OFFPRE2): NORMAL
NEUTROPHILS # BLD: NORMAL 10*3/UL
NEUTROPHILS NFR BLD: NORMAL %
NRBC BLD MANUAL-RTO: NORMAL %
PLAT MORPH BLD: NORMAL
PLATELET # BLD: 293 10*3/UL
POTASSIUM SERPL-SCNC: 4.2 MMOL/L
PROT SERPL-MCNC: 7.2 G/DL
RBC # BLD: 4.01 10*6/UL
RBC MORPH BLD: NORMAL
SODIUM SERPL-SCNC: 139 MMOL/L
WBC # BLD: 7.3 10*3/UL
WBC MORPH BLD: NORMAL

## 2019-11-21 PROCEDURE — 86038 ANTINUCLEAR ANTIBODIES: CPT | Performed by: FAMILY MEDICINE

## 2019-11-21 PROCEDURE — 82728 ASSAY OF FERRITIN: CPT | Performed by: FAMILY MEDICINE

## 2019-11-21 PROCEDURE — 99214 OFFICE O/P EST MOD 30 MIN: CPT | Performed by: INTERNAL MEDICINE

## 2019-11-21 PROCEDURE — 36415 COLL VENOUS BLD VENIPUNCTURE: CPT | Performed by: FAMILY MEDICINE

## 2019-11-21 PROCEDURE — 80053 COMPREHEN METABOLIC PANEL: CPT | Performed by: FAMILY MEDICINE

## 2019-11-21 PROCEDURE — 86235 NUCLEAR ANTIGEN ANTIBODY: CPT | Performed by: FAMILY MEDICINE

## 2019-11-21 PROCEDURE — 83036 HEMOGLOBIN GLYCOSYLATED A1C: CPT | Performed by: FAMILY MEDICINE

## 2019-11-21 PROCEDURE — 86225 DNA ANTIBODY NATIVE: CPT | Performed by: FAMILY MEDICINE

## 2019-11-21 PROCEDURE — 3074F SYST BP LT 130 MM HG: CPT | Performed by: INTERNAL MEDICINE

## 2019-11-21 PROCEDURE — 85025 COMPLETE CBC W/AUTO DIFF WBC: CPT | Performed by: FAMILY MEDICINE

## 2019-11-21 RX ORDER — GLUCOSAMINE/D3/BOSWELLIA SERRA 1500MG-400
1000 TABLET ORAL DAILY
COMMUNITY

## 2019-11-21 RX ORDER — LORATADINE 10 MG/1
10 TABLET ORAL
COMMUNITY

## 2019-11-21 RX ORDER — NORGESTIMATE AND ETHINYL ESTRADIOL 7DAYSX3 28
1 KIT ORAL DAILY
COMMUNITY
Start: 2017-09-17 | End: 2020-12-27 | Stop reason: DRUGHIGH

## 2019-11-21 RX ORDER — PHENTERMINE HYDROCHLORIDE 37.5 MG/1
37.5 TABLET ORAL DAILY
COMMUNITY
Start: 2019-10-10 | End: 2020-12-27 | Stop reason: DRUGHIGH

## 2019-11-22 DIAGNOSIS — R73.01 IMPAIRED FASTING GLUCOSE: Primary | ICD-10-CM

## 2019-11-25 PROBLEM — R76.8 POSITIVE ANA (ANTINUCLEAR ANTIBODY): Status: ACTIVE | Noted: 2019-11-25

## 2019-11-25 PROBLEM — R79.0 LOW FERRITIN: Status: ACTIVE | Noted: 2019-11-25

## 2019-11-26 DIAGNOSIS — R79.0 LOW FERRITIN: Primary | ICD-10-CM

## 2019-12-09 ENCOUNTER — PATIENT MESSAGE (OUTPATIENT)
Dept: FAMILY MEDICINE CLINIC | Facility: CLINIC | Age: 34
End: 2019-12-09

## 2019-12-10 NOTE — TELEPHONE ENCOUNTER
From: Ventura White  To: Weatherfordhebert Arredondo, DO  Sent: 12/9/2019 5:35 PM CST  Subject: Non-Urgent Donna Lemos, Dr. Jaren Welch!   I reviewed my lab results on MyChart and I have been taking the iron supplement that was recommended in the no

## 2019-12-10 NOTE — TELEPHONE ENCOUNTER
Please advise     Notes recorded by Demetris Show, DO on 11/25/2019 at 9:34 PM CST  Low ferritin, recommend iron supplement 324 mg daily for one month and the repeat CBC and ferritin.  May need stool softener while on iron.    +BRIEN which is non specific

## 2019-12-10 NOTE — TELEPHONE ENCOUNTER
Yes, low iron can cause hair loss, would still f/u with Rheum given +BRIEN.      Alessia Garcia, DO  Family Medicine

## 2019-12-11 ENCOUNTER — E-ADVICE (OUTPATIENT)
Dept: CARDIOLOGY | Age: 34
End: 2019-12-11

## 2019-12-13 ENCOUNTER — E-ADVICE (OUTPATIENT)
Dept: CARDIOLOGY | Age: 34
End: 2019-12-13

## 2019-12-13 DIAGNOSIS — E78.2 HYPERLIPIDEMIA, MIXED: Primary | ICD-10-CM

## 2019-12-16 ENCOUNTER — LAB ENCOUNTER (OUTPATIENT)
Dept: LAB | Age: 34
End: 2019-12-16
Attending: FAMILY MEDICINE
Payer: COMMERCIAL

## 2019-12-16 DIAGNOSIS — E78.2 MIXED HYPERLIPIDEMIA: Primary | ICD-10-CM

## 2019-12-16 LAB
CHOLEST SERPL-MCNC: 169 MG/DL
CHOLEST/HDLC SERPL: NORMAL {RATIO}
HDLC SERPL-MCNC: 40 MG/DL
LDLC SERPL CALC-MCNC: 107 MG/DL
LENGTH OF FAST TIME PATIENT: NORMAL H
NONHDLC SERPL-MCNC: 129 MG/DL
TRIGL SERPL-MCNC: 110 MG/DL
VLDLC SERPL CALC-MCNC: NORMAL MG/DL

## 2019-12-16 PROCEDURE — 36415 COLL VENOUS BLD VENIPUNCTURE: CPT

## 2019-12-16 PROCEDURE — 80061 LIPID PANEL: CPT

## 2019-12-23 ENCOUNTER — E-ADVICE (OUTPATIENT)
Dept: CARDIOLOGY | Age: 34
End: 2019-12-23

## 2019-12-23 ENCOUNTER — TELEPHONE (OUTPATIENT)
Dept: SURGERY | Facility: CLINIC | Age: 34
End: 2019-12-23

## 2019-12-30 ENCOUNTER — OFFICE VISIT (OUTPATIENT)
Dept: SURGERY | Facility: CLINIC | Age: 34
End: 2019-12-30
Payer: COMMERCIAL

## 2019-12-30 ENCOUNTER — LAB ENCOUNTER (OUTPATIENT)
Dept: LAB | Facility: HOSPITAL | Age: 34
End: 2019-12-30
Attending: FAMILY MEDICINE
Payer: COMMERCIAL

## 2019-12-30 VITALS
HEART RATE: 69 BPM | HEIGHT: 64 IN | OXYGEN SATURATION: 98 % | DIASTOLIC BLOOD PRESSURE: 83 MMHG | WEIGHT: 212 LBS | SYSTOLIC BLOOD PRESSURE: 138 MMHG | BODY MASS INDEX: 36.19 KG/M2

## 2019-12-30 VITALS — HEIGHT: 64 IN | BODY MASS INDEX: 36.26 KG/M2 | WEIGHT: 212.38 LBS

## 2019-12-30 DIAGNOSIS — R79.0 LOW FERRITIN: ICD-10-CM

## 2019-12-30 DIAGNOSIS — R73.03 PREDIABETES: ICD-10-CM

## 2019-12-30 DIAGNOSIS — E66.01 CLASS 2 SEVERE OBESITY DUE TO EXCESS CALORIES WITH SERIOUS COMORBIDITY AND BODY MASS INDEX (BMI) OF 36.0 TO 36.9 IN ADULT (HCC): Primary | ICD-10-CM

## 2019-12-30 DIAGNOSIS — I10 ESSENTIAL HYPERTENSION, BENIGN: Primary | ICD-10-CM

## 2019-12-30 DIAGNOSIS — Z98.84 HISTORY OF ROUX-EN-Y GASTRIC BYPASS: ICD-10-CM

## 2019-12-30 DIAGNOSIS — E66.9 OBESITY (BMI 30-39.9): ICD-10-CM

## 2019-12-30 DIAGNOSIS — E04.1 NODULAR THYROID DISEASE: ICD-10-CM

## 2019-12-30 DIAGNOSIS — Z51.81 ENCOUNTER FOR THERAPEUTIC DRUG MONITORING: ICD-10-CM

## 2019-12-30 LAB
BASOPHILS # BLD AUTO: 0.07 X10(3) UL (ref 0–0.2)
BASOPHILS NFR BLD AUTO: 1.1 %
DEPRECATED HBV CORE AB SER IA-ACNC: 8.6 NG/ML (ref 12–160)
DEPRECATED RDW RBC AUTO: 47.2 FL (ref 35.1–46.3)
EOSINOPHIL # BLD AUTO: 0.17 X10(3) UL (ref 0–0.7)
EOSINOPHIL NFR BLD AUTO: 2.7 %
ERYTHROCYTE [DISTWIDTH] IN BLOOD BY AUTOMATED COUNT: 13.7 % (ref 11–15)
HCT VFR BLD AUTO: 39.6 % (ref 35–48)
HGB BLD-MCNC: 12.5 G/DL (ref 12–16)
IMM GRANULOCYTES # BLD AUTO: 0.01 X10(3) UL (ref 0–1)
IMM GRANULOCYTES NFR BLD: 0.2 %
LYMPHOCYTES # BLD AUTO: 1.86 X10(3) UL (ref 1–4)
LYMPHOCYTES NFR BLD AUTO: 29.9 %
MCH RBC QN AUTO: 29.8 PG (ref 26–34)
MCHC RBC AUTO-ENTMCNC: 31.6 G/DL (ref 31–37)
MCV RBC AUTO: 94.3 FL (ref 80–100)
MONOCYTES # BLD AUTO: 0.38 X10(3) UL (ref 0.1–1)
MONOCYTES NFR BLD AUTO: 6.1 %
NEUTROPHILS # BLD AUTO: 3.73 X10 (3) UL (ref 1.5–7.7)
NEUTROPHILS # BLD AUTO: 3.73 X10(3) UL (ref 1.5–7.7)
NEUTROPHILS NFR BLD AUTO: 60 %
PLATELET # BLD AUTO: 335 10(3)UL (ref 150–450)
RBC # BLD AUTO: 4.2 X10(6)UL (ref 3.8–5.3)
T4 FREE SERPL-MCNC: 1 NG/DL (ref 0.8–1.7)
TSI SER-ACNC: 0.91 MIU/ML (ref 0.36–3.74)
WBC # BLD AUTO: 6.2 X10(3) UL (ref 4–11)

## 2019-12-30 PROCEDURE — 84439 ASSAY OF FREE THYROXINE: CPT

## 2019-12-30 PROCEDURE — 36415 COLL VENOUS BLD VENIPUNCTURE: CPT

## 2019-12-30 PROCEDURE — 82728 ASSAY OF FERRITIN: CPT

## 2019-12-30 PROCEDURE — 85025 COMPLETE CBC W/AUTO DIFF WBC: CPT

## 2019-12-30 PROCEDURE — 99214 OFFICE O/P EST MOD 30 MIN: CPT | Performed by: INTERNAL MEDICINE

## 2019-12-30 PROCEDURE — 97803 MED NUTRITION INDIV SUBSEQ: CPT | Performed by: DIETITIAN, REGISTERED

## 2019-12-30 PROCEDURE — 84443 ASSAY THYROID STIM HORMONE: CPT

## 2019-12-30 RX ORDER — PHENTERMINE HYDROCHLORIDE 37.5 MG/1
TABLET ORAL
Qty: 45 TABLET | Refills: 2 | Status: SHIPPED | OUTPATIENT
Start: 2019-12-30 | End: 2020-01-13

## 2019-12-30 NOTE — PROGRESS NOTES
[address] [place] [address] AND WEIGHT LOSS CLINIC  [address] [place] [address]  Dept: [phone]  Loc: [phone]    12/30/19      Bariatric Follow-up Nutrition Session    Reginekristine Ilya is a 29year old female Component Value Date    CHOLEST 169 12/16/2019    TRIG 110 12/16/2019    HDL 40 12/16/2019     (H) 12/16/2019    VLDL 22 12/16/2019    TCHDLRATIO 4.42 08/05/2016    NONHDLC 129 12/16/2019    CHOLHDLRATIO 5.4 (H) 06/05/2015        Vitamins/Minerals kg)  09/09/19 : 216 lb 8 oz (98.2 kg)  08/22/19 : 217 lb (98.4 kg)    Weight change: -4.6 lbs since LOV  Post-Op Excess Body Weight Loss: 57% EBWL  BMI: Body mass index is 36.46 kg/m².     Protein Intake: 106 grams/day, 136g CHO per day  Fluid intake:  64+ exercise    Monitor/Evaluate     Anthropometric measurements, Food/fluid intake/choices, Food intolerances, Activity level, Vitamin/mineral supplementation, Reinforce goals, Calorie/protein intake and Other:  annual nutrition labs    Jaelyn Blanco MS RD

## 2019-12-30 NOTE — PROGRESS NOTES
[address], [address]  Dept: [phone]      Patient:  Hiren Leyva  :      1985  MRN:      [id number]    Referring Provider: Candance Calkin MG-UNIT Oral Tab, Take by mouth., Disp: , Rfl:   •  Multiple Vitamins-Minerals (MULTIVITAMIN ADULT OR), Take by mouth 2 (two) times daily. , Disp: , Rfl:   •  Biotin 32897 MCG Oral Tab, Take by mouth., Disp: , Rfl:     Allergies:  Penicillin G; Sulfa Antibi constipation  Musculoskeletal:negative  Neurological: negative  Behavioral/Psych: negative  Endocrine: negative  All other systems were reviewed and are negative    Assessment       Encounter Diagnosis(ses):   Essential hypertension, benign  (primary encou

## 2020-01-03 ENCOUNTER — TELEPHONE (OUTPATIENT)
Dept: CARDIOLOGY | Age: 35
End: 2020-01-03

## 2020-01-06 ENCOUNTER — CLINICAL ABSTRACT (OUTPATIENT)
Dept: CARDIOLOGY | Age: 35
End: 2020-01-06

## 2020-01-13 ENCOUNTER — OFFICE VISIT (OUTPATIENT)
Dept: FAMILY MEDICINE CLINIC | Facility: CLINIC | Age: 35
End: 2020-01-13
Payer: COMMERCIAL

## 2020-01-13 VITALS
SYSTOLIC BLOOD PRESSURE: 128 MMHG | RESPIRATION RATE: 16 BRPM | TEMPERATURE: 98 F | WEIGHT: 222.25 LBS | BODY MASS INDEX: 37.94 KG/M2 | HEART RATE: 72 BPM | HEIGHT: 64 IN | DIASTOLIC BLOOD PRESSURE: 72 MMHG

## 2020-01-13 DIAGNOSIS — Z11.3 SCREENING EXAMINATION FOR STD (SEXUALLY TRANSMITTED DISEASE): Primary | ICD-10-CM

## 2020-01-13 PROCEDURE — 87591 N.GONORRHOEAE DNA AMP PROB: CPT | Performed by: FAMILY MEDICINE

## 2020-01-13 PROCEDURE — 87491 CHLMYD TRACH DNA AMP PROBE: CPT | Performed by: FAMILY MEDICINE

## 2020-01-13 PROCEDURE — 99213 OFFICE O/P EST LOW 20 MIN: CPT | Performed by: FAMILY MEDICINE

## 2020-01-13 NOTE — PROGRESS NOTES
HPI:   Romana Blanks is a 29year old female that presents for STD testing. States its not symptomatic, no vaginal discharge, pelvic pain, dysuria, lesions. She states she was sexually active late December, uses condoms.   Likes to check every 3 morning., Disp: , Rfl: 2  •  loratadine 10 MG Oral Tab, Take by mouth., Disp: , Rfl:   •  Probiotic Product (PROBIOTIC DAILY OR), Take by mouth., Disp: , Rfl:   •  Calcium 500-125 MG-UNIT Oral Tab, Take by mouth., Disp: , Rfl:   •  Multiple Vitamins-[place] of current treatment plan discussed in detail. Questions and concerns addressed. Red flags to RTC or ED reviewed. Patient (or parent) agrees to plan. Return in about 6 months (around 7/13/2020) for annual exam, or sooner if needed.     Sara Cherry

## 2020-01-14 ENCOUNTER — LAB ENCOUNTER (OUTPATIENT)
Dept: LAB | Age: 35
End: 2020-01-14
Attending: FAMILY MEDICINE
Payer: COMMERCIAL

## 2020-01-14 DIAGNOSIS — D64.9 ANEMIA, UNSPECIFIED TYPE: ICD-10-CM

## 2020-01-14 DIAGNOSIS — Z11.3 SCREENING EXAMINATION FOR STD (SEXUALLY TRANSMITTED DISEASE): ICD-10-CM

## 2020-01-14 LAB
C TRACH DNA SPEC QL NAA+PROBE: NEGATIVE
HCV AB SERPL QL IA: NONREACTIVE
N GONORRHOEA DNA SPEC QL NAA+PROBE: NEGATIVE
T PALLIDUM AB SER QL IA: NONREACTIVE

## 2020-01-14 PROCEDURE — 86780 TREPONEMA PALLIDUM: CPT | Performed by: FAMILY MEDICINE

## 2020-01-14 PROCEDURE — 36415 COLL VENOUS BLD VENIPUNCTURE: CPT | Performed by: FAMILY MEDICINE

## 2020-01-14 PROCEDURE — 87389 HIV-1 AG W/HIV-1&-2 AB AG IA: CPT | Performed by: FAMILY MEDICINE

## 2020-01-14 PROCEDURE — 86803 HEPATITIS C AB TEST: CPT | Performed by: FAMILY MEDICINE

## 2020-01-15 ENCOUNTER — OFFICE VISIT (OUTPATIENT)
Dept: HEMATOLOGY/ONCOLOGY | Facility: HOSPITAL | Age: 35
End: 2020-01-15
Attending: INTERNAL MEDICINE
Payer: COMMERCIAL

## 2020-01-15 VITALS
DIASTOLIC BLOOD PRESSURE: 79 MMHG | TEMPERATURE: 100 F | BODY MASS INDEX: 38.07 KG/M2 | HEIGHT: 64 IN | OXYGEN SATURATION: 99 % | SYSTOLIC BLOOD PRESSURE: 152 MMHG | RESPIRATION RATE: 16 BRPM | WEIGHT: 223 LBS | HEART RATE: 99 BPM

## 2020-01-15 DIAGNOSIS — D64.9 ANEMIA, UNSPECIFIED TYPE: Primary | ICD-10-CM

## 2020-01-15 DIAGNOSIS — E61.1 IRON DEFICIENCY: ICD-10-CM

## 2020-01-15 PROBLEM — Z98.84 S/P GASTRIC BYPASS: Status: ACTIVE | Noted: 2020-01-15

## 2020-01-15 LAB
IRON SATURATION: 9 % (ref 15–50)
IRON SERPL-MCNC: 49 UG/DL (ref 50–170)
TOTAL IRON BINDING CAPACITY: 539 UG/DL (ref 240–450)
TRANSFERRIN SERPL-MCNC: 362 MG/DL (ref 200–360)
VIT B12 SERPL-MCNC: 562 PG/ML (ref 193–986)

## 2020-01-15 PROCEDURE — 99243 OFF/OP CNSLTJ NEW/EST LOW 30: CPT | Performed by: INTERNAL MEDICINE

## 2020-01-15 NOTE — PROGRESS NOTES
MD consult for iron deficiency. Has been taking oral iron since Thanksgiving and tolerating well. H/o gastric bypass in 2017. C/o hair loss and fatigue.      Education Record    Learner:  Patient    Barriers / Limitations:  None   Comments:    Method:  Disc

## 2020-01-15 NOTE — CONSULTS
[facility] Report of Consultation    Patient Name: Be España   YOB: 1985   Medical Record Number: [id number]   CSN: [id number]   Consulting Physician: Taylor Adrian MD  Referring Physician(s): Jyothi Gusman  Date of Con Penicillin G            HIVES  Sulfa Antibiotics       HIVES    Current Medications:    Current Outpatient Medications:   •  Norgestim-Eth Estrad Triphasic (TRI-SPRINTEC) 0.18/0.215/0.25 MG-35 MCG Oral Tab, Take 1 tablet by mouth once daily. , Disp: 3 P in acute distress. HEENT: EOMs intact. PERRL. Oropharynx is clear. Neck: No JVD. No palpable lymphadenopathy. Neck is supple. Lymphatics: There is no palpable lymphadenopathy  in the cervical, axillary, or inguinal regions.   Chest: Clear to auscultatio

## 2020-01-15 NOTE — PATIENT INSTRUCTIONS
Please call [phone] ([phone]) with any questions or concerns Monday through Friday 8:00 to 4:30.     For after hours or weekends/holidays for emergent needs, [phone] will reach the on-call MD.

## 2020-01-16 ENCOUNTER — CLINICAL ABSTRACT (OUTPATIENT)
Dept: CARDIOLOGY | Age: 35
End: 2020-01-16

## 2020-01-20 ENCOUNTER — OFFICE VISIT (OUTPATIENT)
Dept: HEMATOLOGY/ONCOLOGY | Age: 35
End: 2020-01-20
Attending: INTERNAL MEDICINE
Payer: COMMERCIAL

## 2020-01-20 VITALS
OXYGEN SATURATION: 99 % | SYSTOLIC BLOOD PRESSURE: 107 MMHG | HEART RATE: 89 BPM | TEMPERATURE: 98 F | DIASTOLIC BLOOD PRESSURE: 68 MMHG | RESPIRATION RATE: 16 BRPM

## 2020-01-20 DIAGNOSIS — R79.0 LOW FERRITIN: ICD-10-CM

## 2020-01-20 DIAGNOSIS — Z98.84 S/P GASTRIC BYPASS: ICD-10-CM

## 2020-01-20 DIAGNOSIS — E61.1 IRON DEFICIENCY: Primary | ICD-10-CM

## 2020-01-20 PROCEDURE — 96374 THER/PROPH/DIAG INJ IV PUSH: CPT

## 2020-01-20 NOTE — PROGRESS NOTES
Pt here for Feraheme infusion.   Arrives Ambulating independently, accompanied by Self           Patient reports possible pregnancy since last therapy cycle: No    Modifications in dose or schedule: No     Frequency of blood return and site check throughout

## 2020-01-20 NOTE — PATIENT INSTRUCTIONS
Ferumoxytol injection  Brand Name: Antonio Israel  What is this medicine? FERUMOXYTOL is an iron complex. Iron is used to make healthy red blood cells, which carry oxygen and nutrients throughout the body.  This medicine is used to treat iron deficiency anemia What should I watch for while using this medicine? Visit your doctor or healthcare professional regularly. Tell your doctor or healthcare professional if your symptoms do not start to get better or if they get worse.  You may need blood work done while you

## 2020-01-20 NOTE — PROGRESS NOTES
Pt tolerated Feraheme infusion well. Given written info. 30 min post VSS- see flow sheet. Pt departed stable, all questions answered.

## 2020-03-05 ENCOUNTER — APPOINTMENT (OUTPATIENT)
Dept: INTERNAL MEDICINE | Age: 35
End: 2020-03-05

## 2020-03-11 ENCOUNTER — OFFICE VISIT (OUTPATIENT)
Dept: FAMILY MEDICINE CLINIC | Facility: CLINIC | Age: 35
End: 2020-03-11
Payer: COMMERCIAL

## 2020-03-11 VITALS
TEMPERATURE: 100 F | OXYGEN SATURATION: 99 % | WEIGHT: 220 LBS | HEART RATE: 98 BPM | HEIGHT: 64 IN | BODY MASS INDEX: 37.56 KG/M2 | DIASTOLIC BLOOD PRESSURE: 80 MMHG | SYSTOLIC BLOOD PRESSURE: 112 MMHG

## 2020-03-11 DIAGNOSIS — J11.1 INFLUENZA-LIKE ILLNESS: Primary | ICD-10-CM

## 2020-03-11 PROCEDURE — 99213 OFFICE O/P EST LOW 20 MIN: CPT | Performed by: NURSE PRACTITIONER

## 2020-03-11 NOTE — PATIENT INSTRUCTIONS
Understanding the Cold Virus  Colds are the most common illness that people get. Most adults get 2 or 3 colds per year, and most children get 5 to 7 colds per year. Colds may be caused by over 200 types of viruses.  The most common of these are rhinovirus Because viruses cause colds, antibiotics do not help. They do not make a cold shorter or relieve symptoms. Taking antibiotics when you don’t need them can make them work less well when you need them for another illness.   Follow all directions for using med Date Last Reviewed: 3/28/2016  © 7370-5658 [employer] [address]. [address] [facility] [place], [address] [place]. All rights reserved. This information is not intended as a substitute for professional medical care.  Always follow your healthcare professional

## 2020-03-11 NOTE — PROGRESS NOTES
CHIEF COMPLAINT:   Patient presents with:  Cold: diahrrea, cough, x 2 days. HPI:   Yenifer Preciado is a 29year old female who presents for upper respiratory symptoms for  2 days. Patient reports congestion, low grade fever, dry cough.  Sympt Performed by Irina Castillo MD at [facility] OR   • GASTRIC BYPASS,OBESE<100CM MOE-EN-Y  08/21/2017   • LASER SURGERY OF EYE Bilateral 6/5/2017         Social History    Tobacco Use      Smoking status: Former Smoker        Years: 12.00        Quit date Educated on supportive measures  Educated on s/s of worsening sx and when to seek higher level of care  Follow up with pcp if not improving  Meds & Refills for this Visit:  Requested Prescriptions     Signed Prescriptions Disp Refills   • Ana María Orta · Decongestant medicines. Several types of decongestants are available without prescription. These may help reduce stuffy or runny nose symptoms. · Prescription or over-the-counter nasal sprays. These may help reduce nasal symptoms, including stuffiness. · Lung infection, such as bronchitis or pneumonia  · Ear infection  If you have asthma or chronic bronchitis, a cold can make your condition worse. When should I call my healthcare provider?   Call your healthcare provider right away if you have any of the

## 2020-03-23 DIAGNOSIS — E66.9 OBESITY (BMI 30-39.9): Primary | ICD-10-CM

## 2020-03-23 DIAGNOSIS — Z98.84 S/P GASTRIC BYPASS: ICD-10-CM

## 2020-04-13 RX ORDER — NITROFURANTOIN 25; 75 MG/1; MG/1
CAPSULE ORAL
Qty: 30 CAPSULE | Refills: 0 | Status: SHIPPED | OUTPATIENT
Start: 2020-04-13 | End: 2020-09-01

## 2020-04-13 NOTE — TELEPHONE ENCOUNTER
Requested Prescriptions     Pending Prescriptions Disp Refills   • Nitrofurantoin Monohyd Macro 100 MG Oral Cap [Pharmacy Med Name: Nitrofurantoin Monohyd Macro 100 MG Oral Capsule] 30 capsule 0     Sig: TAKE 1 CAPSULE BY MOUTH AS NEEDED AFTER INTERCOURSE

## 2020-04-29 ENCOUNTER — TELEPHONE (OUTPATIENT)
Dept: SURGERY | Facility: CLINIC | Age: 35
End: 2020-04-29

## 2020-05-04 ENCOUNTER — LAB ENCOUNTER (OUTPATIENT)
Dept: LAB | Age: 35
End: 2020-05-04
Attending: INTERNAL MEDICINE
Payer: COMMERCIAL

## 2020-05-04 DIAGNOSIS — D64.9 ANEMIA, UNSPECIFIED TYPE: ICD-10-CM

## 2020-05-04 PROCEDURE — 83550 IRON BINDING TEST: CPT

## 2020-05-04 PROCEDURE — 83540 ASSAY OF IRON: CPT

## 2020-05-04 PROCEDURE — 82728 ASSAY OF FERRITIN: CPT

## 2020-05-04 PROCEDURE — 36415 COLL VENOUS BLD VENIPUNCTURE: CPT

## 2020-05-04 PROCEDURE — 85025 COMPLETE CBC W/AUTO DIFF WBC: CPT

## 2020-05-20 NOTE — TELEPHONE ENCOUNTER
4/29/20  @9:01am  Spoke to You Lovett at [facility] , #[phone], [facility]#[phone].  They verified that patient has following benefits for below services:   DX     E66.9    I10       R73.03   Z98.84   [facility] Bariatric Dept

## 2020-05-21 RX ORDER — LOSARTAN POTASSIUM 25 MG/1
TABLET ORAL
Qty: 90 TABLET | Refills: 2 | Status: SHIPPED | OUTPATIENT
Start: 2020-05-21 | End: 2021-04-13

## 2020-05-27 NOTE — TELEPHONE ENCOUNTER
Pharmacy would like to clarify rx, wants to know if pt is to be on belvique and contrave at the same time? Naltrexone-Bupropion HCl ER (CONTRAVE) 8-90 MG Oral Tablet 12 Hr 120 tablet 5 12/15/2016      Sig :  Take 2 tablets by mouth 2 (two) times daily. [facility]

## 2020-06-03 ENCOUNTER — OFFICE VISIT (OUTPATIENT)
Dept: FAMILY MEDICINE CLINIC | Facility: CLINIC | Age: 35
End: 2020-06-03
Payer: COMMERCIAL

## 2020-06-03 VITALS
HEIGHT: 64 IN | WEIGHT: 228.38 LBS | HEART RATE: 114 BPM | SYSTOLIC BLOOD PRESSURE: 110 MMHG | DIASTOLIC BLOOD PRESSURE: 72 MMHG | OXYGEN SATURATION: 98 % | TEMPERATURE: 99 F | RESPIRATION RATE: 18 BRPM | BODY MASS INDEX: 38.99 KG/M2

## 2020-06-03 DIAGNOSIS — N75.1 ABSCESS OF BARTHOLIN'S GLAND: Primary | ICD-10-CM

## 2020-06-03 PROCEDURE — 99213 OFFICE O/P EST LOW 20 MIN: CPT | Performed by: FAMILY MEDICINE

## 2020-06-03 RX ORDER — IBUPROFEN 800 MG/1
800 TABLET ORAL EVERY 8 HOURS PRN
Qty: 60 TABLET | Refills: 0 | Status: SHIPPED | OUTPATIENT
Start: 2020-06-03 | End: 2020-06-26

## 2020-06-03 RX ORDER — DOXYCYCLINE HYCLATE 100 MG/1
100 CAPSULE ORAL 2 TIMES DAILY
Qty: 20 CAPSULE | Refills: 0 | Status: SHIPPED | OUTPATIENT
Start: 2020-06-03 | End: 2020-06-13

## 2020-06-03 NOTE — PROGRESS NOTES
HPI:   Jay Santos is a 29year old female that presents for boil on left labia majora for 3 days, walnut sized, red and very painful. No injury. Has been doing warm compresses and got some exudate today. No fever or dysuria.   Sexually acti Norgestim-Eth Estrad Triphasic (TRI-SPRINTEC) 0.18/0.215/0.25 MG-35 MCG Oral Tab, Take 1 tablet by mouth once daily. , Disp: 3 Package, Rfl: 3  •  NON FORMULARY, [place], Disp: , Rfl:   •  Oxymetazoline HCl (RHOFADE) 1 % External Cream, , Disp: , Rfl:   • for Pain. Dispense: 60 tablet; Refill: 0  - start doxy (allergy to pcn and sulfa) and continue warm compress. Tylenol and ibuprofen for pain. F/u with GYN, will help pt call to set up appt in 1-2 days.      Risks, benefits, and alternatives of current karla

## 2020-06-03 NOTE — PATIENT INSTRUCTIONS
Ibuprofen 800 mg every 8 hours as needed. Can also take with tylenol 1,000 mg every 8 hours as needed. Warm compresses. Follow up with GYN. Understanding Bartholin Cyst and Abscess     A woman has two Bartholin glands.  They are located on the provider  Call your healthcare provider right away if you have any of these:  · Fever of 100.4°F (38°C) or higher, or as directed by your provider  · Redness, swelling, or fluid leaking from the cyst that gets worse  · Pain that gets worse  · Symptoms that

## 2020-06-04 ENCOUNTER — TELEPHONE (OUTPATIENT)
Dept: FAMILY MEDICINE CLINIC | Facility: CLINIC | Age: 35
End: 2020-06-04

## 2020-06-04 NOTE — TELEPHONE ENCOUNTER
Pt had OV 6/3/2020 with Dr. Vicente Martienz for Abscess on left labia and Dr. Vicente Martinez asked me to help facilitate an appt with OB/GYN in the next 1-2 days. Spoke with [address] Dr OB/GYN, scheduled pt for 6/5/2020 at 12:30pm at the [place] office.     Pt informed of

## 2020-06-05 ENCOUNTER — OFFICE VISIT (OUTPATIENT)
Dept: OBGYN CLINIC | Facility: CLINIC | Age: 35
End: 2020-06-05
Payer: COMMERCIAL

## 2020-06-05 VITALS
DIASTOLIC BLOOD PRESSURE: 73 MMHG | HEART RATE: 85 BPM | WEIGHT: 232 LBS | HEIGHT: 64 IN | SYSTOLIC BLOOD PRESSURE: 106 MMHG | BODY MASS INDEX: 39.61 KG/M2

## 2020-06-05 DIAGNOSIS — N76.4 LEFT GENITAL LABIAL ABSCESS: Primary | ICD-10-CM

## 2020-06-05 PROCEDURE — 99213 OFFICE O/P EST LOW 20 MIN: CPT | Performed by: NURSE PRACTITIONER

## 2020-06-05 NOTE — PROGRESS NOTES
Here for new gynecology visit. 29year old G 0 P 0. Patient's last menstrual period was 05/25/2020 (exact date). .     She is here to follow up after seeing her PCP for this same problem 2 days ago.  She states it started approximately 5 days ago and gradu External Gel, as directed, Disp: , Rfl:   Losartan Potassium 25 MG Oral Tab, Take 1 tablet by mouth every morning., Disp: , Rfl: 2  loratadine 10 MG Oral Tab, Take by mouth., Disp: , Rfl:   Probiotic Product (PROBIOTIC DAILY OR), Take by mouth., Disp: , Rf recommend she continue the full course of previously prescribed Rx antibiotics.   To ensure full drainage I would start sitz baths at least BID combined with warm compresses until fully resolved  She is to call with any change where it starts to enlarge aga

## 2020-06-26 ENCOUNTER — OFFICE VISIT (OUTPATIENT)
Dept: RHEUMATOLOGY | Facility: CLINIC | Age: 35
End: 2020-06-26
Payer: COMMERCIAL

## 2020-06-26 VITALS
WEIGHT: 132 LBS | RESPIRATION RATE: 18 BRPM | HEIGHT: 64 IN | TEMPERATURE: 99 F | SYSTOLIC BLOOD PRESSURE: 128 MMHG | DIASTOLIC BLOOD PRESSURE: 82 MMHG | HEART RATE: 88 BPM | BODY MASS INDEX: 22.53 KG/M2

## 2020-06-26 DIAGNOSIS — R76.8 CENTROMERE ANTIBODY POSITIVE: Primary | ICD-10-CM

## 2020-06-26 DIAGNOSIS — R53.82 CHRONIC FATIGUE: ICD-10-CM

## 2020-06-26 DIAGNOSIS — L65.9 HAIR LOSS: ICD-10-CM

## 2020-06-26 DIAGNOSIS — R76.8 POSITIVE ANA (ANTINUCLEAR ANTIBODY): ICD-10-CM

## 2020-06-26 PROCEDURE — 99244 OFF/OP CNSLTJ NEW/EST MOD 40: CPT | Performed by: INTERNAL MEDICINE

## 2020-06-26 NOTE — PROGRESS NOTES
?  RHEUMATOLOGY NEW PATIENT   Date of visit: 6/26/2020  ? Patient presents with:  Establish Care: new pt, referred by PCP, abnormal labs, hair loss, denies joint and muscle pain.       ?  ASSESSMENT, DISCUSSION & PLAN   Assessment:  Centromere antibody p visit:    Centromere antibody positive  -     BRIEN BY IFA, IGG; Future  -     COMPLEMENT C3, SERUM; Future  -     COMPLEMENT C4, SERUM; Future  -     SED RATE, WESTERGREN (AUTOMATED);  Future  -     C-REACTIVE PROTEIN; Future  -     ANTI-CENTROMERE B ANTIBOD surgery, not a current issue  + dry mouth when on weight loss medication, not a current issue  + hx of thyroid biopsy in the past, but states workup was grossly negative at that time, follows with endocrinology   + easy bruising since childhood, slightly i bypass 08/21/2017   • High blood pressure    • Murmur     possible murmur - no problems   • Obesity, unspecified    • Unspecified sleep apnea     AHI 5 c-pap   • Visual impairment     lasik 6/17 - no glasses     Past Surgical History:  Past Surgical Histor Tab, Take by mouth., Disp: , Rfl:   Multiple Vitamins-Minerals (MULTIVITAMIN ADULT OR), Take by mouth 2 (two) times daily. , Disp: , Rfl:   Biotin 54317 MCG Oral Tab, Take by mouth., Disp: , Rfl:       Modified Medications    No medications on file     Medi time. She appears well-developed and well-nourished. No distress. HENT:   Head: Normocephalic and atraumatic. Eyes: Pupils are equal, round, and reactive to light. Conjunctivae and EOM are normal. No scleral icterus. Neck: No JVD present.  No tracheal 07/09/2015    NEPERCENT 48.3 05/04/2020    LYPERCENT 37.4 05/04/2020    MOPERCENT 8.7 05/04/2020    EOPERCENT 4.3 05/04/2020    BAPERCENT 1.0 05/04/2020    NE 3.40 05/04/2020    LYMABS 2.63 05/04/2020    MOABSO 0.61 05/04/2020    EOABSO 0.30 05/04/2020

## 2020-06-29 ENCOUNTER — LAB ENCOUNTER (OUTPATIENT)
Dept: LAB | Facility: HOSPITAL | Age: 35
End: 2020-06-29
Attending: INTERNAL MEDICINE
Payer: COMMERCIAL

## 2020-06-29 ENCOUNTER — HOSPITAL ENCOUNTER (OUTPATIENT)
Dept: CT IMAGING | Age: 35
Discharge: HOME | End: 2020-06-29
Attending: INTERNAL MEDICINE
Payer: COMMERCIAL

## 2020-06-29 ENCOUNTER — OFFICE VISIT (OUTPATIENT)
Dept: SURGERY | Facility: CLINIC | Age: 35
End: 2020-06-29
Payer: COMMERCIAL

## 2020-06-29 VITALS
SYSTOLIC BLOOD PRESSURE: 120 MMHG | BODY MASS INDEX: 40.12 KG/M2 | HEIGHT: 64 IN | WEIGHT: 235 LBS | DIASTOLIC BLOOD PRESSURE: 80 MMHG

## 2020-06-29 VITALS — WEIGHT: 235 LBS | HEIGHT: 64 IN | BODY MASS INDEX: 40.12 KG/M2

## 2020-06-29 DIAGNOSIS — R76.8 POSITIVE ANA (ANTINUCLEAR ANTIBODY): ICD-10-CM

## 2020-06-29 DIAGNOSIS — E66.01 MORBID OBESITY WITH BMI OF 40.0-44.9, ADULT (HCC): ICD-10-CM

## 2020-06-29 DIAGNOSIS — R73.03 PREDIABETES: ICD-10-CM

## 2020-06-29 DIAGNOSIS — Z51.81 ENCOUNTER FOR THERAPEUTIC DRUG MONITORING: ICD-10-CM

## 2020-06-29 DIAGNOSIS — D49.7 ADRENAL TUMOR: ICD-10-CM

## 2020-06-29 DIAGNOSIS — E66.01 CLASS 3 SEVERE OBESITY DUE TO EXCESS CALORIES WITH SERIOUS COMORBIDITY AND BODY MASS INDEX (BMI) OF 40.0 TO 44.9 IN ADULT (HCC): Primary | ICD-10-CM

## 2020-06-29 DIAGNOSIS — R76.8 CENTROMERE ANTIBODY POSITIVE: ICD-10-CM

## 2020-06-29 DIAGNOSIS — I10 ESSENTIAL HYPERTENSION, BENIGN: Primary | ICD-10-CM

## 2020-06-29 DIAGNOSIS — Z98.84 HISTORY OF ROUX-EN-Y GASTRIC BYPASS: ICD-10-CM

## 2020-06-29 DIAGNOSIS — I10 ESSENTIAL HYPERTENSION, BENIGN: ICD-10-CM

## 2020-06-29 DIAGNOSIS — E04.1 NODULAR THYROID DISEASE: ICD-10-CM

## 2020-06-29 DIAGNOSIS — R53.82 CHRONIC FATIGUE: ICD-10-CM

## 2020-06-29 LAB
BUN BLD-MCNC: 11 MG/DL (ref 7–18)
C3 SERPL-MCNC: 138 MG/DL (ref 90–180)
C4 SERPL-MCNC: 25.6 MG/DL (ref 10–40)
CREAT BLD-MCNC: 0.62 MG/DL (ref 0.55–1.02)
CRP SERPL-MCNC: 0.43 MG/DL (ref ?–0.3)
ERYTHROCYTE [SEDIMENTATION RATE] IN BLOOD: 10 MM/HR (ref 0–20)

## 2020-06-29 PROCEDURE — 97803 MED NUTRITION INDIV SUBSEQ: CPT | Performed by: DIETITIAN, REGISTERED

## 2020-06-29 PROCEDURE — 86160 COMPLEMENT ANTIGEN: CPT

## 2020-06-29 PROCEDURE — 86038 ANTINUCLEAR ANTIBODIES: CPT

## 2020-06-29 PROCEDURE — 86140 C-REACTIVE PROTEIN: CPT

## 2020-06-29 PROCEDURE — 83516 IMMUNOASSAY NONANTIBODY: CPT

## 2020-06-29 PROCEDURE — 74170 CT ABD WO CNTRST FLWD CNTRST: CPT | Performed by: INTERNAL MEDICINE

## 2020-06-29 PROCEDURE — 84520 ASSAY OF UREA NITROGEN: CPT

## 2020-06-29 PROCEDURE — 85652 RBC SED RATE AUTOMATED: CPT

## 2020-06-29 PROCEDURE — 82565 ASSAY OF CREATININE: CPT

## 2020-06-29 PROCEDURE — 86039 ANTINUCLEAR ANTIBODIES (ANA): CPT

## 2020-06-29 PROCEDURE — 99214 OFFICE O/P EST MOD 30 MIN: CPT | Performed by: INTERNAL MEDICINE

## 2020-06-29 PROCEDURE — 36415 COLL VENOUS BLD VENIPUNCTURE: CPT

## 2020-06-29 NOTE — PROGRESS NOTES
[address] [place] [address] AND WEIGHT LOSS CLINIC  [address] [place] [address]  Dept: [phone]  Loc: [phone]    06/29/20      Bariatric Follow-up Nutrition Session    Belinda Da Silva is a 29year old female Value Date    CHOLEST 169 12/16/2019    TRIG 110 12/16/2019    HDL 40 12/16/2019     (H) 12/16/2019    VLDL 22 12/16/2019    TCHDLRATIO 4.42 08/05/2016    NONHDLC 129 12/16/2019    CHOLHDLRATIO 5.4 (H) 06/05/2015        Vitamins/Minerals:  Lab Resul Encounters:  06/29/20 : 235 lb (106.6 kg)  06/26/20 : 132 lb (59.9 kg)  06/05/20 : 232 lb (105.2 kg)  06/03/20 : 228 lb 6 oz (103.6 kg)  03/11/20 : 220 lb (99.8 kg)  02/25/20 : 222 lb (100.7 kg)    Weight change: +3 lbs since LOV  Post-Op Excess Body Weigh activities when having the urge to snack, healthy dessert options, and the importance of staying active daily. Pt agreed to goals below, will rtc in 3 months or sooner, if needed.     Nutrition Diagnosis     Nutrition Diagnosis: Morbid obesity: Improvement

## 2020-06-30 ENCOUNTER — HOSPITAL ENCOUNTER (OUTPATIENT)
Dept: ULTRASOUND IMAGING | Age: 35
Discharge: HOME | End: 2020-06-30
Attending: INTERNAL MEDICINE
Payer: COMMERCIAL

## 2020-06-30 ENCOUNTER — APPOINTMENT (OUTPATIENT)
Dept: LAB | Age: 35
End: 2020-06-30
Attending: INTERNAL MEDICINE
Payer: COMMERCIAL

## 2020-06-30 DIAGNOSIS — D49.7 ADRENAL TUMOR: ICD-10-CM

## 2020-06-30 DIAGNOSIS — I10 ESSENTIAL HYPERTENSION, BENIGN: ICD-10-CM

## 2020-06-30 DIAGNOSIS — R73.03 PREDIABETES: ICD-10-CM

## 2020-06-30 DIAGNOSIS — E04.1 NODULAR THYROID DISEASE: ICD-10-CM

## 2020-06-30 DIAGNOSIS — Z98.84 HISTORY OF ROUX-EN-Y GASTRIC BYPASS: ICD-10-CM

## 2020-06-30 PROCEDURE — 76536 US EXAM OF HEAD AND NECK: CPT | Performed by: INTERNAL MEDICINE

## 2020-07-01 LAB — CENTROMERE AB, IGG: 2 AU/ML

## 2020-07-02 LAB — NUCLEAR IGG TITR SER IF: NEGATIVE {TITER}

## 2020-07-28 ENCOUNTER — OFFICE VISIT (OUTPATIENT)
Dept: SURGERY | Facility: CLINIC | Age: 35
End: 2020-07-28
Payer: COMMERCIAL

## 2020-07-28 VITALS
DIASTOLIC BLOOD PRESSURE: 74 MMHG | WEIGHT: 218.13 LBS | BODY MASS INDEX: 37.24 KG/M2 | HEART RATE: 91 BPM | HEIGHT: 64 IN | SYSTOLIC BLOOD PRESSURE: 112 MMHG | OXYGEN SATURATION: 99 %

## 2020-07-28 DIAGNOSIS — E66.01 CLASS 2 SEVERE OBESITY DUE TO EXCESS CALORIES WITH SERIOUS COMORBIDITY AND BODY MASS INDEX (BMI) OF 35.0 TO 35.9 IN ADULT (HCC): Primary | ICD-10-CM

## 2020-07-28 RX ORDER — PHENTERMINE HYDROCHLORIDE 37.5 MG/1
TABLET ORAL
COMMUNITY
End: 2020-11-05

## 2020-07-28 RX ORDER — PNV NO.95/FERROUS FUM/FOLIC AC 28MG-0.8MG
TABLET ORAL
COMMUNITY
End: 2021-05-12

## 2020-07-28 NOTE — PROGRESS NOTES
[address] [facility] [address]  [id number] [facility] [address]  Dept: [phone]    7/28/2020    BARIATRIC EXISTING PATIENT/FOLLOW UP    HPI:  Jasvir Citlaly is a 29year old-year

## 2020-07-30 ENCOUNTER — LAB ENCOUNTER (OUTPATIENT)
Dept: LAB | Age: 35
End: 2020-07-30
Attending: SURGERY
Payer: COMMERCIAL

## 2020-07-30 DIAGNOSIS — E66.01 CLASS 2 SEVERE OBESITY DUE TO EXCESS CALORIES WITH SERIOUS COMORBIDITY AND BODY MASS INDEX (BMI) OF 35.0 TO 35.9 IN ADULT (HCC): ICD-10-CM

## 2020-07-30 LAB
ALBUMIN SERPL-MCNC: 3.5 G/DL (ref 3.4–5)
ALBUMIN/GLOB SERPL: 0.9 {RATIO} (ref 1–2)
ALP LIVER SERPL-CCNC: 68 U/L (ref 37–98)
ALT SERPL-CCNC: 26 U/L (ref 13–56)
ANION GAP SERPL CALC-SCNC: 3 MMOL/L (ref 0–18)
AST SERPL-CCNC: 23 U/L (ref 15–37)
BASOPHILS # BLD AUTO: 0.08 X10(3) UL (ref 0–0.2)
BASOPHILS NFR BLD AUTO: 1.4 %
BILIRUB SERPL-MCNC: 0.5 MG/DL (ref 0.1–2)
BUN BLD-MCNC: 16 MG/DL (ref 7–18)
BUN/CREAT SERPL: 27.1 (ref 10–20)
CALCIUM BLD-MCNC: 8.8 MG/DL (ref 8.5–10.1)
CHLORIDE SERPL-SCNC: 105 MMOL/L (ref 98–112)
CO2 SERPL-SCNC: 30 MMOL/L (ref 21–32)
CREAT BLD-MCNC: 0.59 MG/DL (ref 0.55–1.02)
DEPRECATED RDW RBC AUTO: 42.4 FL (ref 35.1–46.3)
EOSINOPHIL # BLD AUTO: 0.17 X10(3) UL (ref 0–0.7)
EOSINOPHIL NFR BLD AUTO: 3.1 %
ERYTHROCYTE [DISTWIDTH] IN BLOOD BY AUTOMATED COUNT: 12 % (ref 11–15)
FOLATE SERPL-MCNC: 16.8 NG/ML (ref 8.7–?)
GLOBULIN PLAS-MCNC: 3.8 G/DL (ref 2.8–4.4)
GLUCOSE BLD-MCNC: 81 MG/DL (ref 70–99)
HCT VFR BLD AUTO: 41 % (ref 35–48)
HGB BLD-MCNC: 13.4 G/DL (ref 12–16)
IMM GRANULOCYTES # BLD AUTO: 0.01 X10(3) UL (ref 0–1)
IMM GRANULOCYTES NFR BLD: 0.2 %
LYMPHOCYTES # BLD AUTO: 1.72 X10(3) UL (ref 1–4)
LYMPHOCYTES NFR BLD AUTO: 31.2 %
M PROTEIN MFR SERPL ELPH: 7.3 G/DL (ref 6.4–8.2)
MCH RBC QN AUTO: 31.5 PG (ref 26–34)
MCHC RBC AUTO-ENTMCNC: 32.7 G/DL (ref 31–37)
MCV RBC AUTO: 96.2 FL (ref 80–100)
MONOCYTES # BLD AUTO: 0.46 X10(3) UL (ref 0.1–1)
MONOCYTES NFR BLD AUTO: 8.3 %
NEUTROPHILS # BLD AUTO: 3.08 X10 (3) UL (ref 1.5–7.7)
NEUTROPHILS # BLD AUTO: 3.08 X10(3) UL (ref 1.5–7.7)
NEUTROPHILS NFR BLD AUTO: 55.8 %
OSMOLALITY SERPL CALC.SUM OF ELEC: 286 MOSM/KG (ref 275–295)
PATIENT FASTING Y/N/NP: YES
PLATELET # BLD AUTO: 226 10(3)UL (ref 150–450)
POTASSIUM SERPL-SCNC: 4 MMOL/L (ref 3.5–5.1)
RBC # BLD AUTO: 4.26 X10(6)UL (ref 3.8–5.3)
SODIUM SERPL-SCNC: 138 MMOL/L (ref 136–145)
VIT B12 SERPL-MCNC: 471 PG/ML (ref 193–986)
VIT D+METAB SERPL-MCNC: 59.4 NG/ML (ref 30–100)
WBC # BLD AUTO: 5.5 X10(3) UL (ref 4–11)

## 2020-07-30 PROCEDURE — 85025 COMPLETE CBC W/AUTO DIFF WBC: CPT

## 2020-07-30 PROCEDURE — 82746 ASSAY OF FOLIC ACID SERUM: CPT

## 2020-07-30 PROCEDURE — 84425 ASSAY OF VITAMIN B-1: CPT

## 2020-07-30 PROCEDURE — 80053 COMPREHEN METABOLIC PANEL: CPT

## 2020-07-30 PROCEDURE — 82607 VITAMIN B-12: CPT

## 2020-07-30 PROCEDURE — 36415 COLL VENOUS BLD VENIPUNCTURE: CPT

## 2020-07-30 PROCEDURE — 82306 VITAMIN D 25 HYDROXY: CPT

## 2020-08-03 ENCOUNTER — OFFICE VISIT (OUTPATIENT)
Dept: SURGERY | Facility: CLINIC | Age: 35
End: 2020-08-03
Payer: COMMERCIAL

## 2020-08-03 VITALS
DIASTOLIC BLOOD PRESSURE: 80 MMHG | HEIGHT: 64 IN | SYSTOLIC BLOOD PRESSURE: 110 MMHG | WEIGHT: 218 LBS | BODY MASS INDEX: 37.22 KG/M2

## 2020-08-03 DIAGNOSIS — F43.9 STRESS: ICD-10-CM

## 2020-08-03 DIAGNOSIS — Z51.81 ENCOUNTER FOR THERAPEUTIC DRUG MONITORING: ICD-10-CM

## 2020-08-03 DIAGNOSIS — I10 ESSENTIAL HYPERTENSION, BENIGN: Primary | ICD-10-CM

## 2020-08-03 DIAGNOSIS — E66.9 OBESITY (BMI 30-39.9): ICD-10-CM

## 2020-08-03 DIAGNOSIS — Z98.84 HISTORY OF ROUX-EN-Y GASTRIC BYPASS: ICD-10-CM

## 2020-08-03 LAB — VITAMIN B1 (THIAMINE), WHOLE B: 195 NMOL/L

## 2020-08-03 PROCEDURE — 3074F SYST BP LT 130 MM HG: CPT | Performed by: INTERNAL MEDICINE

## 2020-08-03 PROCEDURE — 3008F BODY MASS INDEX DOCD: CPT | Performed by: INTERNAL MEDICINE

## 2020-08-03 PROCEDURE — 3079F DIAST BP 80-89 MM HG: CPT | Performed by: INTERNAL MEDICINE

## 2020-08-03 PROCEDURE — 99214 OFFICE O/P EST MOD 30 MIN: CPT | Performed by: INTERNAL MEDICINE

## 2020-08-03 NOTE — PROGRESS NOTES
[address]  Dept: [phone]      Patient:  Be España  :      1985  MRN:      [id number]    Referring Provider: Kehinde Givens Adapalene 0.3 % External Gel, as needed.   , Disp: , Rfl:   •  Losartan Potassium 25 MG Oral Tab, Take 1 tablet by mouth every morning., Disp: , Rfl: 2  •  loratadine 10 MG Oral Tab, Take by mouth., Disp: , Rfl:   •  Probiotic Product (PROBIOTIC DAILY OR), normal, no murmur, click, rub or gallop, regular rate and rhythm  Abdomen: soft, non-tender; bowel sounds normal; no masses,  no organomegaly  Extremities: edema trace, good movement  Pulses: 2+ and symmetric  Skin: Skin color, texture, turgor normal. No r

## 2020-08-17 ENCOUNTER — PATIENT MESSAGE (OUTPATIENT)
Dept: FAMILY MEDICINE CLINIC | Facility: CLINIC | Age: 35
End: 2020-08-17

## 2020-08-17 DIAGNOSIS — I10 ESSENTIAL HYPERTENSION, BENIGN: Primary | ICD-10-CM

## 2020-08-17 NOTE — TELEPHONE ENCOUNTER
From: Yenifer Preciado  To: Cesia Krause DO  Sent: 8/17/2020 5:22 PM CDT  Subject: Non-Urgent Lajas Ahmet, Dr. Louise Sanderson! I received a letter stating that my cardiologist is retiring from his practice.  When you have the chance, can

## 2020-09-01 ENCOUNTER — OFFICE VISIT (OUTPATIENT)
Dept: FAMILY MEDICINE CLINIC | Facility: CLINIC | Age: 35
End: 2020-09-01
Payer: COMMERCIAL

## 2020-09-01 VITALS
DIASTOLIC BLOOD PRESSURE: 80 MMHG | WEIGHT: 219 LBS | HEIGHT: 64 IN | HEART RATE: 74 BPM | TEMPERATURE: 98 F | BODY MASS INDEX: 37.39 KG/M2 | SYSTOLIC BLOOD PRESSURE: 134 MMHG | RESPIRATION RATE: 16 BRPM

## 2020-09-01 DIAGNOSIS — N39.0 RECURRENT UTI: ICD-10-CM

## 2020-09-01 DIAGNOSIS — Z30.41 ENCOUNTER FOR BIRTH CONTROL PILLS MAINTENANCE: ICD-10-CM

## 2020-09-01 DIAGNOSIS — Z13.31 NEGATIVE DEPRESSION SCREENING: ICD-10-CM

## 2020-09-01 DIAGNOSIS — Z00.00 ANNUAL PHYSICAL EXAM: Primary | ICD-10-CM

## 2020-09-01 PROBLEM — R63.5 WEIGHT GAIN: Status: RESOLVED | Noted: 2019-07-11 | Resolved: 2020-09-01

## 2020-09-01 PROBLEM — R87.810 ASCUS WITH POSITIVE HIGH RISK HPV CERVICAL: Status: RESOLVED | Noted: 2017-12-06 | Resolved: 2020-09-01

## 2020-09-01 PROBLEM — R87.610 ASCUS WITH POSITIVE HIGH RISK HPV CERVICAL: Status: RESOLVED | Noted: 2017-12-06 | Resolved: 2020-09-01

## 2020-09-01 PROBLEM — I34.0 NON-RHEUMATIC MITRAL REGURGITATION: Status: ACTIVE | Noted: 2017-12-07

## 2020-09-01 PROBLEM — E78.2 HYPERLIPIDEMIA, MIXED: Status: ACTIVE | Noted: 2018-12-27

## 2020-09-01 PROBLEM — E66.9 OBESITY (BMI 30-39.9): Status: RESOLVED | Noted: 2018-08-03 | Resolved: 2020-09-01

## 2020-09-01 PROBLEM — E87.8 ELECTROLYTE IMBALANCE: Status: RESOLVED | Noted: 2018-01-02 | Resolved: 2020-09-01

## 2020-09-01 PROBLEM — Z87.42 HISTORY OF OVARIAN CYST: Status: RESOLVED | Noted: 2017-11-30 | Resolved: 2020-09-01

## 2020-09-01 PROBLEM — Z98.84 HISTORY OF ROUX-EN-Y GASTRIC BYPASS: Status: RESOLVED | Noted: 2017-09-12 | Resolved: 2020-09-01

## 2020-09-01 PROBLEM — Z51.81 ENCOUNTER FOR THERAPEUTIC DRUG MONITORING: Status: RESOLVED | Noted: 2017-09-12 | Resolved: 2020-09-01

## 2020-09-01 PROBLEM — E66.9 OBESITY: Status: RESOLVED | Noted: 2017-08-21 | Resolved: 2020-09-01

## 2020-09-01 PROCEDURE — 3075F SYST BP GE 130 - 139MM HG: CPT | Performed by: FAMILY MEDICINE

## 2020-09-01 PROCEDURE — 3008F BODY MASS INDEX DOCD: CPT | Performed by: FAMILY MEDICINE

## 2020-09-01 PROCEDURE — 3079F DIAST BP 80-89 MM HG: CPT | Performed by: FAMILY MEDICINE

## 2020-09-01 PROCEDURE — 99395 PREV VISIT EST AGE 18-39: CPT | Performed by: FAMILY MEDICINE

## 2020-09-01 RX ORDER — NORGESTIMATE AND ETHINYL ESTRADIOL 7DAYSX3 28
1 KIT ORAL DAILY
Qty: 3 PACKAGE | Refills: 4 | Status: SHIPPED | OUTPATIENT
Start: 2020-09-01 | End: 2021-04-17

## 2020-09-01 RX ORDER — NITROFURANTOIN 25; 75 MG/1; MG/1
CAPSULE ORAL
Qty: 30 CAPSULE | Refills: 1 | Status: SHIPPED | OUTPATIENT
Start: 2020-09-01

## 2020-09-01 RX ORDER — SERTRALINE HYDROCHLORIDE 100 MG/1
100 TABLET, FILM COATED ORAL DAILY
COMMUNITY
Start: 2020-08-25

## 2020-09-01 RX ORDER — CHLORAL HYDRATE 500 MG
CAPSULE ORAL
COMMUNITY
Start: 2020-08-28 | End: 2021-05-12

## 2020-09-01 NOTE — PROGRESS NOTES
Venancio Hi is a 29year old female that presents for annual physical exam.     Last Pap: Pap Smear,1 Year due on 12/04/2020  Hx of abnormal pap: yes, follows with GYN, most recent pap neg in 2019   STI testing desired: no  PHQ2: 0  Vaccines: AS NEEDED AFTER INTERCOURSE, Disp: 30 capsule, Rfl: 1  •  Norgestim-Eth Estrad Triphasic (TRI-SPRINTEC) 0.18/0.215/0.25 MG-35 MCG Oral Tab, Take 1 tablet by mouth daily. , Disp: 3 Package, Rfl: 4  •  Phentermine HCl 37.5 MG Oral Tab, Take by mouth every mor Single      Spouse name: Not on file      Number of children: 0      Years of education: Not on file      Highest education level: Not on file    Occupational History      Occupation: TEACHER        Employer: [employer] [address]: [id number] Exercise:  Yes          recently        Bike Helmet: Not Asked        Seat Belt: Yes        Self-Exams: Not Asked    Social History Narrative      Not on file      REVIEW OF SYSTEMS:   GENERAL: feels well otherwise  SKIN: denies any unusual skin lesions  EY : 132 lb (59.9 kg)      ASSESSMENT AND PLAN:   29year old year old female who presents for a complete physical exam.     1. Annual physical exam  - continue to work on healthy diet and regular exercise  - breast self awareness taught  - following with GYN

## 2020-09-01 NOTE — PATIENT INSTRUCTIONS
Thank you for allowing me to participate in your care today. I will contact you with any results from today's visit. Lab results are typically available in 2-3 days for blood tests, and 3-5 days for any cultures or Paps.    Please let me know if you hav prediabetes All women with no symptoms who are overweight or obese and have 1 or more other risk factors for diabetes At least every 3 years.  Also, testing for diabetes during pregnancy after the 24th week.    Type 2 diabetes, prediabetes All women diagnos months after the first dose and the third dose given 6 months after the first dose   Influenza (flu) All women in this age group Once a year   Measles, mumps, rubella (MMR) All women in this age group who have no record of these infections or vaccines 1 or not up-to-date on their childhood vaccines should get all appropriate catch-up vaccines recommended by the CDC. Date Last Reviewed: 10/1/2017  © 9377-4974 [employer] [address]. [address] [facility] [place] [place]. All rights reserved.  This info

## 2020-09-23 ENCOUNTER — LAB ENCOUNTER (OUTPATIENT)
Dept: LAB | Age: 35
End: 2020-09-23
Attending: INTERNAL MEDICINE
Payer: COMMERCIAL

## 2020-09-23 DIAGNOSIS — E61.1 IRON DEFICIENCY: ICD-10-CM

## 2020-09-23 LAB
BASOPHILS # BLD AUTO: 0.09 X10(3) UL (ref 0–0.2)
BASOPHILS NFR BLD AUTO: 1.1 %
DEPRECATED HBV CORE AB SER IA-ACNC: 50.5 NG/ML
DEPRECATED RDW RBC AUTO: 45.4 FL (ref 35.1–46.3)
EOSINOPHIL # BLD AUTO: 0.19 X10(3) UL (ref 0–0.7)
EOSINOPHIL NFR BLD AUTO: 2.3 %
ERYTHROCYTE [DISTWIDTH] IN BLOOD BY AUTOMATED COUNT: 12.9 % (ref 11–15)
HCT VFR BLD AUTO: 39.7 %
HGB BLD-MCNC: 13 G/DL
IMM GRANULOCYTES # BLD AUTO: 0.02 X10(3) UL (ref 0–1)
IMM GRANULOCYTES NFR BLD: 0.2 %
IRON SATURATION: 25 %
IRON SERPL-MCNC: 105 UG/DL
LYMPHOCYTES # BLD AUTO: 2.48 X10(3) UL (ref 1–4)
LYMPHOCYTES NFR BLD AUTO: 30 %
MCH RBC QN AUTO: 31.4 PG (ref 26–34)
MCHC RBC AUTO-ENTMCNC: 32.7 G/DL (ref 31–37)
MCV RBC AUTO: 95.9 FL
MONOCYTES # BLD AUTO: 0.75 X10(3) UL (ref 0.1–1)
MONOCYTES NFR BLD AUTO: 9.1 %
NEUTROPHILS # BLD AUTO: 4.74 X10 (3) UL (ref 1.5–7.7)
NEUTROPHILS # BLD AUTO: 4.74 X10(3) UL (ref 1.5–7.7)
NEUTROPHILS NFR BLD AUTO: 57.3 %
PLATELET # BLD AUTO: 285 10(3)UL (ref 150–450)
RBC # BLD AUTO: 4.14 X10(6)UL
TOTAL IRON BINDING CAPACITY: 426 UG/DL (ref 240–450)
TRANSFERRIN SERPL-MCNC: 286 MG/DL (ref 200–360)
WBC # BLD AUTO: 8.3 X10(3) UL (ref 4–11)

## 2020-09-23 PROCEDURE — 82728 ASSAY OF FERRITIN: CPT

## 2020-09-23 PROCEDURE — 83540 ASSAY OF IRON: CPT

## 2020-09-23 PROCEDURE — 83550 IRON BINDING TEST: CPT

## 2020-09-23 PROCEDURE — 85025 COMPLETE CBC W/AUTO DIFF WBC: CPT

## 2020-09-23 PROCEDURE — 36415 COLL VENOUS BLD VENIPUNCTURE: CPT

## 2020-10-15 ENCOUNTER — OFFICE VISIT (OUTPATIENT)
Dept: SLEEP CENTER | Age: 35
End: 2020-10-15
Attending: INTERNAL MEDICINE
Payer: COMMERCIAL

## 2020-10-15 DIAGNOSIS — G47.33 OSA (OBSTRUCTIVE SLEEP APNEA): ICD-10-CM

## 2020-10-15 DIAGNOSIS — R06.83 SNORING: ICD-10-CM

## 2020-10-15 PROCEDURE — 95806 SLEEP STUDY UNATT&RESP EFFT: CPT

## 2020-10-21 ENCOUNTER — TELEPHONE (OUTPATIENT)
Dept: RHEUMATOLOGY | Facility: CLINIC | Age: 35
End: 2020-10-21

## 2020-10-23 NOTE — PROCEDURES
[address] [place] [address],[facility] 100       Accredited by the [facility] of [facility] (AASM)    PATIENT'S NAME:        [facility]  ATTENDING PHYSICIAN:   Jet Gonzalez M.D.   REFERRING PHYSICIAN:   Jet Gonzalez M.D heart rate of 111 beats per minute. IMPRESSION:  This study did not reveal evidence of sleep-disordered breathing or significant oxyhemoglobin desaturations. RECOMMENDATIONS:    1.    In the absence of obstructive sleep apnea, CPAP therapy is no longe

## 2020-10-28 ENCOUNTER — PATIENT MESSAGE (OUTPATIENT)
Dept: FAMILY MEDICINE CLINIC | Facility: CLINIC | Age: 35
End: 2020-10-28

## 2020-10-28 NOTE — TELEPHONE ENCOUNTER
From: Mario Patterson  To: Fernanda Rose DO  Sent: 10/28/2020 11:22 AM CDT  Subject: Other    Hi, Dr. Francesca Schneider! At our last appointment, you inspired me to make a Halloween costume for my kitten. I wanted to share the finished design with you.  I

## 2020-11-05 ENCOUNTER — OFFICE VISIT (OUTPATIENT)
Dept: SURGERY | Facility: CLINIC | Age: 35
End: 2020-11-05
Payer: COMMERCIAL

## 2020-11-05 VITALS
SYSTOLIC BLOOD PRESSURE: 116 MMHG | DIASTOLIC BLOOD PRESSURE: 77 MMHG | HEART RATE: 81 BPM | WEIGHT: 228.81 LBS | HEIGHT: 64 IN | BODY MASS INDEX: 39.06 KG/M2

## 2020-11-05 DIAGNOSIS — I10 ESSENTIAL HYPERTENSION, BENIGN: Primary | ICD-10-CM

## 2020-11-05 DIAGNOSIS — Z51.81 ENCOUNTER FOR THERAPEUTIC DRUG MONITORING: ICD-10-CM

## 2020-11-05 DIAGNOSIS — Z98.84 HISTORY OF ROUX-EN-Y GASTRIC BYPASS: ICD-10-CM

## 2020-11-05 DIAGNOSIS — F43.9 STRESS: ICD-10-CM

## 2020-11-05 DIAGNOSIS — E66.9 OBESITY (BMI 30-39.9): ICD-10-CM

## 2020-11-05 PROCEDURE — 3008F BODY MASS INDEX DOCD: CPT | Performed by: INTERNAL MEDICINE

## 2020-11-05 PROCEDURE — 3074F SYST BP LT 130 MM HG: CPT | Performed by: INTERNAL MEDICINE

## 2020-11-05 PROCEDURE — 99214 OFFICE O/P EST MOD 30 MIN: CPT | Performed by: INTERNAL MEDICINE

## 2020-11-05 PROCEDURE — 3078F DIAST BP <80 MM HG: CPT | Performed by: INTERNAL MEDICINE

## 2020-11-05 PROCEDURE — 99072 ADDL SUPL MATRL&STAF TM PHE: CPT | Performed by: INTERNAL MEDICINE

## 2020-11-05 RX ORDER — PHENTERMINE HYDROCHLORIDE 37.5 MG/1
37.5 TABLET ORAL
Qty: 30 TABLET | Refills: 1 | Status: SHIPPED | OUTPATIENT
Start: 2020-11-05 | End: 2021-07-19

## 2020-11-05 NOTE — PROGRESS NOTES
[address]  Dept: [phone]      Patient:  Miya Caraballo  :      1985  MRN:      [id number]    Referring Provider: Wendi Núñez Adapalene 0.3 % External Gel, as needed.   , Disp: , Rfl:   •  Losartan Potassium 25 MG Oral Tab, Take 1 tablet by mouth every morning., Disp: , Rfl: 2  •  loratadine 10 MG Oral Tab, Take by mouth., Disp: , Rfl:   •  Probiotic Product (PROBIOTIC DAILY OR), bilaterally  Heart: S1, S2 normal, no murmur, click, rub or gallop, regular rate and rhythm  Abdomen: soft, non-tender; bowel sounds normal; no masses,  no organomegaly  Extremities: edema trace, good movement  Pulses: 2+ and symmetric  Skin: Skin color, t

## 2020-11-09 ENCOUNTER — PATIENT MESSAGE (OUTPATIENT)
Dept: FAMILY MEDICINE CLINIC | Facility: CLINIC | Age: 35
End: 2020-11-09

## 2020-11-09 NOTE — TELEPHONE ENCOUNTER
From: Saman Mackay  To: Sara Cherry DO  Sent: 11/9/2020 11:59 AM CST  Subject: Prescription Question    Dr. Jordyn Pollack! I have a boil in my labia that is causing me discomfort. It’s not as bad as it was in June.  It’s also not located i

## 2020-11-09 NOTE — TELEPHONE ENCOUNTER
Spoke to patient. Scheduled appt.    Future Appointments   Date Time Provider Nba Kalani   11/12/2020  4:30 PM Denise Baca DO [facility] [address] [facility] [address]   11/30/2020  3:30 PM Nelly Rodriguez, DO EMGRHEUMPLFD [facility] [address]   12/7/2020  5:00 PM PF  RM1 P

## 2020-12-03 ENCOUNTER — APPOINTMENT (OUTPATIENT)
Dept: CARDIOLOGY | Age: 35
End: 2020-12-03

## 2020-12-07 ENCOUNTER — HOSPITAL ENCOUNTER (OUTPATIENT)
Dept: ULTRASOUND IMAGING | Age: 35
Discharge: HOME | End: 2020-12-07
Attending: INTERNAL MEDICINE
Payer: COMMERCIAL

## 2020-12-07 DIAGNOSIS — E04.1 NODULAR THYROID DISEASE: ICD-10-CM

## 2020-12-07 PROCEDURE — 76536 US EXAM OF HEAD AND NECK: CPT | Performed by: INTERNAL MEDICINE

## 2020-12-21 ENCOUNTER — OFFICE VISIT (OUTPATIENT)
Dept: RHEUMATOLOGY | Facility: CLINIC | Age: 35
End: 2020-12-21
Payer: COMMERCIAL

## 2020-12-21 VITALS
SYSTOLIC BLOOD PRESSURE: 128 MMHG | HEIGHT: 64 IN | DIASTOLIC BLOOD PRESSURE: 80 MMHG | TEMPERATURE: 97 F | WEIGHT: 234.63 LBS | RESPIRATION RATE: 16 BRPM | BODY MASS INDEX: 40.06 KG/M2 | HEART RATE: 80 BPM

## 2020-12-21 DIAGNOSIS — R76.8 POSITIVE ANA (ANTINUCLEAR ANTIBODY): Primary | ICD-10-CM

## 2020-12-21 DIAGNOSIS — L65.9 HAIR LOSS: ICD-10-CM

## 2020-12-21 DIAGNOSIS — R76.8 CENTROMERE ANTIBODY POSITIVE: ICD-10-CM

## 2020-12-21 DIAGNOSIS — R53.82 CHRONIC FATIGUE: ICD-10-CM

## 2020-12-21 PROCEDURE — 3074F SYST BP LT 130 MM HG: CPT | Performed by: INTERNAL MEDICINE

## 2020-12-21 PROCEDURE — 3079F DIAST BP 80-89 MM HG: CPT | Performed by: INTERNAL MEDICINE

## 2020-12-21 PROCEDURE — 3008F BODY MASS INDEX DOCD: CPT | Performed by: INTERNAL MEDICINE

## 2020-12-21 PROCEDURE — 99214 OFFICE O/P EST MOD 30 MIN: CPT | Performed by: INTERNAL MEDICINE

## 2020-12-21 NOTE — PROGRESS NOTES
?  RHEUMATOLOGY FOLLOW UP   Date of visit: 12/21/2020  ? Patient presents with: Follow - Up: 5 month f/u. Feeling ok. 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 Hair loss not as bad as it was.  Has added zoloft th instructions. No further questions at this time. Spent 25 min face to face with patient, >50% of that time spent discussing potential etiology to symptoms, treatment options and coordinating care.      ?  Plan:  Diagnoses and all orders for this visit: rheumatologic evaluation due to BRIEN positivity and hair loss. Has hx of gastric bypass surgery in August 2017. Was going for her routine exam, and while there, she brought up the significant hair loss.  Was evaluated by dermatologist and told that she seizures. Denies hx of pericarditis or pleuritis. No history of prior blood clot in the legs or lungs, strokes or ischemic phenomenon.   The patient denies any history of uveitis, crampy abdominal pain, constipation, diarrhea, bloody stools, Achilles heel session: 1 or 2      Comment: social    Drug use: No    Medications:    •  Phentermine HCl 37.5 MG Oral Tab, Take 1 tablet (37.5 mg total) by mouth every morning before breakfast., Disp: 30 tablet, Rfl: 1    •  Sertraline HCl 100 MG Oral Tab, Take 100 mg b vision and photophobia. Respiratory: Negative for cough, shortness of breath and wheezing. Cardiovascular: Negative for chest pain, palpitations and leg swelling. Gastrointestinal: Negative for abdominal pain, heartburn and nausea.    Genitourinary: ankles, or joints of the feet. Bilateral shoulders with full ROM. Bilateral knees without medial joint line tenderness, no crepitus, no effusion. Lymphadenopathy:     She has no cervical adenopathy.    Neurological: She is alert and oriented to person, 0.19 09/23/2020    BAABSO 0.09 09/23/2020     Lab Results   Component Value Date    GLU 81 07/30/2020    BUN 16 07/30/2020    BUNCREA 27.1 (H) 07/30/2020    CREATSERUM 0.59 07/30/2020    ANIONGAP 3 07/30/2020     08/05/2016    GFRNAA 120 07/30/2020

## 2021-01-28 RX ORDER — SERTRALINE HYDROCHLORIDE 100 MG/1
100 TABLET, FILM COATED ORAL
COMMUNITY
Start: 2020-08-25

## 2021-01-28 RX ORDER — CHLORAL HYDRATE 500 MG
CAPSULE ORAL
COMMUNITY
Start: 2020-08-28 | End: 2021-01-29

## 2021-01-28 RX ORDER — PNV NO.95/FERROUS FUM/FOLIC AC 28MG-0.8MG
TABLET ORAL
COMMUNITY
End: 2021-01-29

## 2021-01-29 ENCOUNTER — OFFICE VISIT (OUTPATIENT)
Dept: CARDIOLOGY | Age: 36
End: 2021-01-29

## 2021-01-29 VITALS
BODY MASS INDEX: 39.27 KG/M2 | DIASTOLIC BLOOD PRESSURE: 70 MMHG | HEIGHT: 64 IN | WEIGHT: 230 LBS | HEART RATE: 86 BPM | SYSTOLIC BLOOD PRESSURE: 122 MMHG

## 2021-01-29 DIAGNOSIS — I34.0 NON-RHEUMATIC MITRAL REGURGITATION: ICD-10-CM

## 2021-01-29 DIAGNOSIS — I10 HYPERTENSION, BENIGN: Primary | ICD-10-CM

## 2021-01-29 DIAGNOSIS — E78.2 HYPERLIPIDEMIA, MIXED: ICD-10-CM

## 2021-01-29 PROCEDURE — 99213 OFFICE O/P EST LOW 20 MIN: CPT | Performed by: INTERNAL MEDICINE

## 2021-01-29 SDOH — HEALTH STABILITY: PHYSICAL HEALTH: ON AVERAGE, HOW MANY DAYS PER WEEK DO YOU ENGAGE IN MODERATE TO STRENUOUS EXERCISE (LIKE A BRISK WALK)?: 0 DAYS

## 2021-01-29 SDOH — HEALTH STABILITY: PHYSICAL HEALTH: ON AVERAGE, HOW MANY MINUTES DO YOU ENGAGE IN EXERCISE AT THIS LEVEL?: 0 MIN

## 2021-01-29 ASSESSMENT — ENCOUNTER SYMPTOMS
COUGH: 0
FEVER: 0
SUSPICIOUS LESIONS: 0
WEIGHT GAIN: 0
WEIGHT LOSS: 0
CHILLS: 0
HEMATOCHEZIA: 0
HEMOPTYSIS: 0
BRUISES/BLEEDS EASILY: 0

## 2021-01-29 ASSESSMENT — PATIENT HEALTH QUESTIONNAIRE - PHQ9
CLINICAL INTERPRETATION OF PHQ9 SCORE: NO FURTHER SCREENING NEEDED
1. LITTLE INTEREST OR PLEASURE IN DOING THINGS: NOT AT ALL
SUM OF ALL RESPONSES TO PHQ9 QUESTIONS 1 AND 2: 0
SUM OF ALL RESPONSES TO PHQ9 QUESTIONS 1 AND 2: 0
CLINICAL INTERPRETATION OF PHQ2 SCORE: NO FURTHER SCREENING NEEDED
2. FEELING DOWN, DEPRESSED OR HOPELESS: NOT AT ALL

## 2021-01-30 ENCOUNTER — LAB ENCOUNTER (OUTPATIENT)
Dept: LAB | Age: 36
End: 2021-01-30
Attending: INTERNAL MEDICINE
Payer: COMMERCIAL

## 2021-01-30 DIAGNOSIS — L65.9 HAIR LOSS: ICD-10-CM

## 2021-01-30 DIAGNOSIS — E78.2 HYPERLIPIDEMIA, MIXED: Primary | ICD-10-CM

## 2021-01-30 DIAGNOSIS — D64.9 ANEMIA, UNSPECIFIED TYPE: ICD-10-CM

## 2021-01-30 DIAGNOSIS — E61.1 IRON DEFICIENCY: ICD-10-CM

## 2021-01-30 DIAGNOSIS — R76.8 CENTROMERE ANTIBODY POSITIVE: ICD-10-CM

## 2021-01-30 DIAGNOSIS — R76.8 POSITIVE ANA (ANTINUCLEAR ANTIBODY): ICD-10-CM

## 2021-01-30 DIAGNOSIS — R53.82 CHRONIC FATIGUE: ICD-10-CM

## 2021-01-30 DIAGNOSIS — R79.0 LOW FERRITIN: ICD-10-CM

## 2021-01-30 DIAGNOSIS — Z98.84 S/P GASTRIC BYPASS: ICD-10-CM

## 2021-01-30 LAB
BASOPHILS # BLD AUTO: 0.07 X10(3) UL (ref 0–0.2)
BASOPHILS NFR BLD AUTO: 1.1 %
CHOLEST SMN-MCNC: 144 MG/DL (ref ?–200)
DEPRECATED HBV CORE AB SER IA-ACNC: 20.1 NG/ML
DEPRECATED RDW RBC AUTO: 45.7 FL (ref 35.1–46.3)
EOSINOPHIL # BLD AUTO: 0.21 X10(3) UL (ref 0–0.7)
EOSINOPHIL NFR BLD AUTO: 3.3 %
ERYTHROCYTE [DISTWIDTH] IN BLOOD BY AUTOMATED COUNT: 12.7 % (ref 11–15)
HCT VFR BLD AUTO: 42.8 %
HDLC SERPL-MCNC: 42 MG/DL (ref 40–59)
HGB BLD-MCNC: 13.8 G/DL
IMM GRANULOCYTES # BLD AUTO: 0.01 X10(3) UL (ref 0–1)
IMM GRANULOCYTES NFR BLD: 0.2 %
IRON SATURATION: 29 %
IRON SERPL-MCNC: 140 UG/DL
LDLC SERPL CALC-MCNC: 84 MG/DL (ref ?–100)
LYMPHOCYTES # BLD AUTO: 1.68 X10(3) UL (ref 1–4)
LYMPHOCYTES NFR BLD AUTO: 26.4 %
MCH RBC QN AUTO: 31.4 PG (ref 26–34)
MCHC RBC AUTO-ENTMCNC: 32.2 G/DL (ref 31–37)
MCV RBC AUTO: 97.3 FL
MONOCYTES # BLD AUTO: 0.41 X10(3) UL (ref 0.1–1)
MONOCYTES NFR BLD AUTO: 6.4 %
NEUTROPHILS # BLD AUTO: 3.98 X10 (3) UL (ref 1.5–7.7)
NEUTROPHILS # BLD AUTO: 3.98 X10(3) UL (ref 1.5–7.7)
NEUTROPHILS NFR BLD AUTO: 62.6 %
NONHDLC SERPL-MCNC: 102 MG/DL (ref ?–130)
PATIENT FASTING Y/N/NP: YES
PLATELET # BLD AUTO: 304 10(3)UL (ref 150–450)
RBC # BLD AUTO: 4.4 X10(6)UL
TOTAL IRON BINDING CAPACITY: 487 UG/DL (ref 240–450)
TRANSFERRIN SERPL-MCNC: 327 MG/DL (ref 200–360)
TRIGL SERPL-MCNC: 92 MG/DL (ref 30–149)
VIT B12 SERPL-MCNC: 702 PG/ML (ref 193–986)
VLDLC SERPL CALC-MCNC: 18 MG/DL (ref 0–30)
WBC # BLD AUTO: 6.4 X10(3) UL (ref 4–11)

## 2021-01-30 PROCEDURE — 80061 LIPID PANEL: CPT

## 2021-01-30 PROCEDURE — 83550 IRON BINDING TEST: CPT

## 2021-01-30 PROCEDURE — 83540 ASSAY OF IRON: CPT

## 2021-01-30 PROCEDURE — 36415 COLL VENOUS BLD VENIPUNCTURE: CPT

## 2021-01-30 PROCEDURE — 86038 ANTINUCLEAR ANTIBODIES: CPT

## 2021-01-30 PROCEDURE — 85025 COMPLETE CBC W/AUTO DIFF WBC: CPT

## 2021-01-30 PROCEDURE — 82728 ASSAY OF FERRITIN: CPT

## 2021-01-30 PROCEDURE — 82607 VITAMIN B-12: CPT

## 2021-02-01 LAB — ANA SCREEN: NEGATIVE

## 2021-02-02 ENCOUNTER — TELEPHONE (OUTPATIENT)
Dept: CARDIOLOGY | Age: 36
End: 2021-02-02

## 2021-02-08 ENCOUNTER — TELEPHONE (OUTPATIENT)
Dept: RHEUMATOLOGY | Facility: CLINIC | Age: 36
End: 2021-02-08

## 2021-03-04 ENCOUNTER — OFFICE VISIT (OUTPATIENT)
Dept: SURGERY | Facility: CLINIC | Age: 36
End: 2021-03-04
Payer: COMMERCIAL

## 2021-03-04 VITALS
DIASTOLIC BLOOD PRESSURE: 70 MMHG | BODY MASS INDEX: 40.12 KG/M2 | WEIGHT: 235 LBS | HEIGHT: 64 IN | SYSTOLIC BLOOD PRESSURE: 110 MMHG

## 2021-03-04 DIAGNOSIS — I10 ESSENTIAL HYPERTENSION, BENIGN: Primary | ICD-10-CM

## 2021-03-04 DIAGNOSIS — Z51.81 ENCOUNTER FOR THERAPEUTIC DRUG MONITORING: ICD-10-CM

## 2021-03-04 DIAGNOSIS — F43.9 STRESS: ICD-10-CM

## 2021-03-04 DIAGNOSIS — Z98.84 HISTORY OF ROUX-EN-Y GASTRIC BYPASS: ICD-10-CM

## 2021-03-04 PROCEDURE — 3074F SYST BP LT 130 MM HG: CPT | Performed by: INTERNAL MEDICINE

## 2021-03-04 PROCEDURE — 99214 OFFICE O/P EST MOD 30 MIN: CPT | Performed by: INTERNAL MEDICINE

## 2021-03-04 PROCEDURE — 3008F BODY MASS INDEX DOCD: CPT | Performed by: INTERNAL MEDICINE

## 2021-03-04 PROCEDURE — 3078F DIAST BP <80 MM HG: CPT | Performed by: INTERNAL MEDICINE

## 2021-03-04 NOTE — PROGRESS NOTES
[address] [place] [address],[address]  Dept: [phone]      Patient:  Saman Mackay  :      1985  MRN:      [id number]    Referring Provider: Moni Heck Losartan Potassium 25 MG Oral Tab, Take 1 tablet by mouth every morning., Disp: , Rfl: 2  •  loratadine 10 MG Oral Tab, Take by mouth., Disp: , Rfl:   •  Probiotic Product (PROBIOTIC DAILY OR), Take by mouth., Disp: , Rfl:   •  Calcium 500-125 MG-UNIT Ora rhythm  Abdomen: soft, non-tender; bowel sounds normal; no masses,  no organomegaly  Extremities: edema trace, good movement  Pulses: 2+ and symmetric  Skin: Skin color, texture, turgor normal. No rashes or lesions       ROS:    Constitutional: negative  R

## 2021-04-13 RX ORDER — LOSARTAN POTASSIUM 25 MG/1
TABLET ORAL
Qty: 90 TABLET | Refills: 3 | Status: SHIPPED | OUTPATIENT
Start: 2021-04-13

## 2021-04-17 ENCOUNTER — OFFICE VISIT (OUTPATIENT)
Dept: FAMILY MEDICINE CLINIC | Facility: CLINIC | Age: 36
End: 2021-04-17
Payer: COMMERCIAL

## 2021-04-17 VITALS
SYSTOLIC BLOOD PRESSURE: 132 MMHG | RESPIRATION RATE: 16 BRPM | HEART RATE: 81 BPM | TEMPERATURE: 98 F | OXYGEN SATURATION: 98 % | DIASTOLIC BLOOD PRESSURE: 78 MMHG

## 2021-04-17 DIAGNOSIS — B37.2 CANDIDAL SKIN INFECTION: Primary | ICD-10-CM

## 2021-04-17 PROCEDURE — 3075F SYST BP GE 130 - 139MM HG: CPT | Performed by: NURSE PRACTITIONER

## 2021-04-17 PROCEDURE — 99213 OFFICE O/P EST LOW 20 MIN: CPT | Performed by: NURSE PRACTITIONER

## 2021-04-17 PROCEDURE — 3078F DIAST BP <80 MM HG: CPT | Performed by: NURSE PRACTITIONER

## 2021-04-17 RX ORDER — NORGESTIMATE AND ETHINYL ESTRADIOL 0.25-0.035
1 KIT ORAL DAILY
COMMUNITY
Start: 2020-12-15

## 2021-04-17 RX ORDER — NYSTATIN 100000 [USP'U]/G
1 POWDER TOPICAL 3 TIMES DAILY
Qty: 60 G | Refills: 0 | Status: SHIPPED | OUTPATIENT
Start: 2021-04-17 | End: 2021-06-01

## 2021-04-17 NOTE — PATIENT INSTRUCTIONS
Candida Skin Infection (Adult)   Candida is a type of yeast. It grows naturally on the skin and in the mouth. If it grows out of control, it can cause an infection.  Candida can cause infections in the genital area, skin folds, in the mouth, and under the healthcare provider right away if any of these occur:  · Pain or redness that gets worse or spreads  · Fluid coming from the skin  · Yellow crusts on the skin  · Fever of 100.4°F (38°C) or higher, or as directed by your provider  Radha last reviewed thi

## 2021-04-17 NOTE — PROGRESS NOTES
CHIEF COMPLAINT:   Patient presents with:  Rash         HPI:    Jasvir Boucher is a 28year old female who presents for evaluation of a rash. Per patient rash started in the past 2 days. Rash has been unchanged since onset.   Patient has not had HCl injection 100 mg, 100 mg, Intramuscular, Once, Kodi Doherty MD  cyanocobalamin (VITAMIN B12) 1000 MCG/ML injection 1,000 mcg, 1,000 mcg, Intramuscular, Once, Kodi Doherty MD       Past Medical History:   Diagnosis Date   • Anxiety state     seeing ps Irma Castro is a 28year old female who presents for evaluation of a rash. Findings are consistent with:    ASSESSMENT:  Candidal skin infection  (primary encounter diagnosis)    PLAN: Meds as listed below.   Comfort measures as described in occur.  Home care  Your healthcare provider will advise using an antifungal cream, powder, or gel for the rash. He or she may also prescribe a medicine for the itch. Follow all instructions for using these medicines.   General care  · Keep your skin clean b

## 2021-05-12 ENCOUNTER — OFFICE VISIT (OUTPATIENT)
Dept: FAMILY MEDICINE CLINIC | Facility: CLINIC | Age: 36
End: 2021-05-12
Payer: COMMERCIAL

## 2021-05-12 VITALS
TEMPERATURE: 98 F | OXYGEN SATURATION: 99 % | BODY MASS INDEX: 40.29 KG/M2 | RESPIRATION RATE: 16 BRPM | HEIGHT: 64 IN | HEART RATE: 76 BPM | SYSTOLIC BLOOD PRESSURE: 128 MMHG | WEIGHT: 236 LBS | DIASTOLIC BLOOD PRESSURE: 80 MMHG

## 2021-05-12 DIAGNOSIS — N89.8 VAGINAL DISCHARGE: Primary | ICD-10-CM

## 2021-05-12 DIAGNOSIS — R30.0 BURNING WITH URINATION: ICD-10-CM

## 2021-05-12 PROCEDURE — 87086 URINE CULTURE/COLONY COUNT: CPT | Performed by: PHYSICIAN ASSISTANT

## 2021-05-12 PROCEDURE — 81003 URINALYSIS AUTO W/O SCOPE: CPT | Performed by: PHYSICIAN ASSISTANT

## 2021-05-12 PROCEDURE — 87510 GARDNER VAG DNA DIR PROBE: CPT | Performed by: PHYSICIAN ASSISTANT

## 2021-05-12 PROCEDURE — 3074F SYST BP LT 130 MM HG: CPT | Performed by: PHYSICIAN ASSISTANT

## 2021-05-12 PROCEDURE — 99213 OFFICE O/P EST LOW 20 MIN: CPT | Performed by: PHYSICIAN ASSISTANT

## 2021-05-12 PROCEDURE — 87480 CANDIDA DNA DIR PROBE: CPT | Performed by: PHYSICIAN ASSISTANT

## 2021-05-12 PROCEDURE — 87660 TRICHOMONAS VAGIN DIR PROBE: CPT | Performed by: PHYSICIAN ASSISTANT

## 2021-05-12 PROCEDURE — 3008F BODY MASS INDEX DOCD: CPT | Performed by: PHYSICIAN ASSISTANT

## 2021-05-12 PROCEDURE — 3079F DIAST BP 80-89 MM HG: CPT | Performed by: PHYSICIAN ASSISTANT

## 2021-05-12 RX ORDER — METRONIDAZOLE 500 MG/1
500 TABLET ORAL 2 TIMES DAILY
Qty: 14 TABLET | Refills: 0 | Status: SHIPPED | OUTPATIENT
Start: 2021-05-12 | End: 2021-06-01 | Stop reason: ALTCHOICE

## 2021-05-12 NOTE — PATIENT INSTRUCTIONS
Thank you for choosing Eliza Garcia PA-C at [place] [address]  To Do: [address]  1. Begin medications as prescribed  2. Will contact with results  3.  Follow-up if symptoms persist or increase    • Please signup for MY CHART, which is insurance company approved your testing, please call Central Scheduling at [phone]  Please allow our office 5 business days to contact you regarding any testing results.     Refill policies:   Allow 3 business days for refills; controlled substances ma

## 2021-05-12 NOTE — PROGRESS NOTES
[address] [facility] Internal Medicine Progress Note    CC:  Patient presents with:  Vaginal Discharge: x1 week with burning on urination      HPI:   HPI  Vaginal discharge x 1 week  Burning with urination  + vaginal discharge  + odor  + irritation  No con MD  cyanocobalamin (VITAMIN B12) 1000 MCG/ML injection 1,000 mcg, 1,000 mcg, Intramuscular, Once, Joselin Pulliam MD        Review of Systems :  Review of Systems   Constitutional: Negative for chills and fever.    Respiratory: Negative for cough and shortnes Routine      Meds & Refills for this Visit:  Requested Prescriptions     Signed Prescriptions Disp Refills   • metRONIDAZOLE 500 MG Oral Tab 14 tablet 0     Sig: Take 1 tablet (500 mg total) by mouth 2 (two) times a day.        Imaging & Consults:  None

## 2021-05-13 ENCOUNTER — TELEPHONE (OUTPATIENT)
Dept: FAMILY MEDICINE CLINIC | Facility: CLINIC | Age: 36
End: 2021-05-13

## 2021-05-13 ENCOUNTER — PATIENT MESSAGE (OUTPATIENT)
Dept: FAMILY MEDICINE CLINIC | Facility: CLINIC | Age: 36
End: 2021-05-13

## 2021-05-14 ENCOUNTER — TELEPHONE (OUTPATIENT)
Dept: FAMILY MEDICINE CLINIC | Facility: CLINIC | Age: 36
End: 2021-05-14

## 2021-05-14 NOTE — TELEPHONE ENCOUNTER
From: Mario Patterson  To: Nikole Rodriguez PA-C  Sent: 5/13/2021 5:54 PM CDT  Subject: Test Results Question    Hi! I’m concerned about my test results, and would like some clarification.  Could you or a nurse call me to discuss my test results

## 2021-05-14 NOTE — TELEPHONE ENCOUNTER
Pt calling to discuss trichomonas prior to talking with her partner.  Pt asking several questions regarding how long a person can have a trichomonas and be asymptomatic for more that 1 year, this writer informed pt it is possible to have it and be asymptoma

## 2021-05-14 NOTE — PROGRESS NOTES
Positive for trichomonas which is a STI  She is on appropriate treatment  Partner should contact doctor to begin treatment as well

## 2021-05-19 ENCOUNTER — OFFICE VISIT (OUTPATIENT)
Dept: FAMILY MEDICINE CLINIC | Facility: CLINIC | Age: 36
End: 2021-05-19
Payer: COMMERCIAL

## 2021-05-19 VITALS
TEMPERATURE: 98 F | BODY MASS INDEX: 40.29 KG/M2 | SYSTOLIC BLOOD PRESSURE: 122 MMHG | DIASTOLIC BLOOD PRESSURE: 74 MMHG | WEIGHT: 236 LBS | RESPIRATION RATE: 16 BRPM | HEIGHT: 64 IN | OXYGEN SATURATION: 99 % | HEART RATE: 76 BPM

## 2021-05-19 DIAGNOSIS — Z11.3 SCREENING FOR STD (SEXUALLY TRANSMITTED DISEASE): Primary | ICD-10-CM

## 2021-05-19 PROCEDURE — 87491 CHLMYD TRACH DNA AMP PROBE: CPT | Performed by: PHYSICIAN ASSISTANT

## 2021-05-19 PROCEDURE — 3074F SYST BP LT 130 MM HG: CPT | Performed by: PHYSICIAN ASSISTANT

## 2021-05-19 PROCEDURE — 99213 OFFICE O/P EST LOW 20 MIN: CPT | Performed by: PHYSICIAN ASSISTANT

## 2021-05-19 PROCEDURE — 3078F DIAST BP <80 MM HG: CPT | Performed by: PHYSICIAN ASSISTANT

## 2021-05-19 PROCEDURE — 3008F BODY MASS INDEX DOCD: CPT | Performed by: PHYSICIAN ASSISTANT

## 2021-05-19 PROCEDURE — 87591 N.GONORRHOEAE DNA AMP PROB: CPT | Performed by: PHYSICIAN ASSISTANT

## 2021-05-19 NOTE — PROGRESS NOTES
[address] [facility] Internal Medicine Progress Note    CC:  Patient presents with: Follow - Up      HPI:   HPI  Patient is following up from her abnormal lab results last week. Patient tested positive for trichomoniasis.   She states since taking medicat 600-d3 20 , Disp: , Rfl:   Multiple Vitamins-Minerals (MULTIVITAMIN ADULT OR), Take by mouth 2 (two) times daily. , Disp: , Rfl:   Biotin 97599 MCG Oral Tab, Take by mouth., Disp: , Rfl:     Thiamine HCl injection 100 mg, 100 mg, Intramuscular, Once, [facility] Chlamydia/Gc Amplification      Meds & Refills for this Visit:  Requested Prescriptions      No prescriptions requested or ordered in this encounter       Imaging & Consults:  None     Patient/Caregiver Education: Patient/Caregiver Education: There are no

## 2021-05-19 NOTE — PATIENT INSTRUCTIONS
Thank you for choosing Bella Baires PA-C at [place] [address]  To Do: [address]  1.  Get lab work done    • Please signup for [facility] CHART, which is electronic access to your record if you have not done so.  All your results will post on allow our office 5 business days to contact you regarding any testing results. Refill policies:   Allow 3 business days for refills; controlled substances may take longer and must be picked up from the office in person.   Narcotic medications can only be

## 2021-05-21 ENCOUNTER — LAB ENCOUNTER (OUTPATIENT)
Dept: LAB | Age: 36
End: 2021-05-21
Attending: PHYSICIAN ASSISTANT
Payer: COMMERCIAL

## 2021-05-21 DIAGNOSIS — E61.1 IRON DEFICIENCY: ICD-10-CM

## 2021-05-21 DIAGNOSIS — Z98.84 S/P GASTRIC BYPASS: ICD-10-CM

## 2021-05-21 DIAGNOSIS — Z11.3 SCREENING FOR STD (SEXUALLY TRANSMITTED DISEASE): ICD-10-CM

## 2021-05-21 PROCEDURE — 87389 HIV-1 AG W/HIV-1&-2 AB AG IA: CPT | Performed by: PHYSICIAN ASSISTANT

## 2021-05-21 PROCEDURE — 86803 HEPATITIS C AB TEST: CPT | Performed by: PHYSICIAN ASSISTANT

## 2021-05-21 PROCEDURE — 87340 HEPATITIS B SURFACE AG IA: CPT | Performed by: PHYSICIAN ASSISTANT

## 2021-05-21 PROCEDURE — 86706 HEP B SURFACE ANTIBODY: CPT | Performed by: PHYSICIAN ASSISTANT

## 2021-05-21 PROCEDURE — 86780 TREPONEMA PALLIDUM: CPT | Performed by: PHYSICIAN ASSISTANT

## 2021-06-01 ENCOUNTER — OFFICE VISIT (OUTPATIENT)
Dept: FAMILY MEDICINE CLINIC | Facility: CLINIC | Age: 36
End: 2021-06-01
Payer: COMMERCIAL

## 2021-06-01 VITALS
BODY MASS INDEX: 39.99 KG/M2 | TEMPERATURE: 97 F | OXYGEN SATURATION: 98 % | HEIGHT: 64 IN | DIASTOLIC BLOOD PRESSURE: 70 MMHG | SYSTOLIC BLOOD PRESSURE: 110 MMHG | RESPIRATION RATE: 16 BRPM | HEART RATE: 72 BPM | WEIGHT: 234.25 LBS

## 2021-06-01 DIAGNOSIS — B37.2 CANDIDAL SKIN INFECTION: ICD-10-CM

## 2021-06-01 DIAGNOSIS — M62.830 SPASM OF THORACOLUMBAR MUSCLE: Primary | ICD-10-CM

## 2021-06-01 DIAGNOSIS — Z20.2 EXPOSURE TO SEXUALLY TRANSMITTED DISEASE (STD): ICD-10-CM

## 2021-06-01 PROCEDURE — 87480 CANDIDA DNA DIR PROBE: CPT | Performed by: FAMILY MEDICINE

## 2021-06-01 PROCEDURE — 3074F SYST BP LT 130 MM HG: CPT | Performed by: FAMILY MEDICINE

## 2021-06-01 PROCEDURE — 3078F DIAST BP <80 MM HG: CPT | Performed by: FAMILY MEDICINE

## 2021-06-01 PROCEDURE — 3008F BODY MASS INDEX DOCD: CPT | Performed by: FAMILY MEDICINE

## 2021-06-01 PROCEDURE — 87510 GARDNER VAG DNA DIR PROBE: CPT | Performed by: FAMILY MEDICINE

## 2021-06-01 PROCEDURE — 87660 TRICHOMONAS VAGIN DIR PROBE: CPT | Performed by: FAMILY MEDICINE

## 2021-06-01 PROCEDURE — 99214 OFFICE O/P EST MOD 30 MIN: CPT | Performed by: FAMILY MEDICINE

## 2021-06-01 RX ORDER — CYCLOBENZAPRINE HCL 10 MG
10 TABLET ORAL NIGHTLY PRN
Qty: 15 TABLET | Refills: 0 | Status: SHIPPED | OUTPATIENT
Start: 2021-06-01 | End: 2021-07-19

## 2021-06-01 RX ORDER — NYSTATIN 100000 [USP'U]/G
1 POWDER TOPICAL 3 TIMES DAILY
Qty: 60 G | Refills: 2 | Status: SHIPPED | OUTPATIENT
Start: 2021-06-01 | End: 2021-06-11

## 2021-06-01 NOTE — PROGRESS NOTES
HPI:   Saman Mackay is a 28year old female.     Patient presents with:  STD: tested +trich - finished abx - would like to re-test and would like to review results  Shoulder Pain: left shoulder - has tried heating with no help - also back pain cyclobenzaprine 10 MG Oral Tab, Take 1 tablet (10 mg total) by mouth nightly as needed for Muscle spasms. , Disp: 15 tablet, Rfl: 0  •  Nystatin 789806 UNIT/GM External Powder, Apply 1 Application topically 3 (three) times daily for 10 days. , Disp: 60 g, Rf well developed, well nourished, no apparent distress. HEENT:     Head:  Normocephalic, atraumatic   NECK: Supple, full ROM, no spinous process tenderness   SKIN: No rashes, no skin lesion, no bruising, good turgor.   HEART:  Regular rate and rhythm, no mur

## 2021-06-01 NOTE — PATIENT INSTRUCTIONS
Pain  Alternate Tylenol and ibuprofen as needed for pain and inflammation  Can add muscle relaxer cyclobenzaprine at night only as needed for pain. This medication can be sedating and habit forming. Do not combine with alcohol.   Biofreeze and heating pad stretching or moving wrong, without noting pain at the time. Other causes include:  · Overexertion, lifting, pushing, pulling incorrectly or too aggressively. · Sudden twisting, bending or stretching from an accident (car or fall), or accidental movement. especially if you have other medical problems or are taking other medicines. · You may use over-the-counter medicine to control pain, unless another pain medicine was prescribed.  If you have chronic conditions like diabetes, liver or kidney disease, stoma

## 2021-06-16 ENCOUNTER — OFFICE VISIT (OUTPATIENT)
Dept: SURGERY | Facility: CLINIC | Age: 36
End: 2021-06-16
Payer: COMMERCIAL

## 2021-06-16 VITALS
DIASTOLIC BLOOD PRESSURE: 80 MMHG | OXYGEN SATURATION: 98 % | BODY MASS INDEX: 40.34 KG/M2 | SYSTOLIC BLOOD PRESSURE: 124 MMHG | HEART RATE: 81 BPM | WEIGHT: 236.31 LBS | HEIGHT: 64 IN

## 2021-06-16 DIAGNOSIS — E66.01 MORBID OBESITY WITH BMI OF 40.0-44.9, ADULT (HCC): ICD-10-CM

## 2021-06-16 DIAGNOSIS — I10 ESSENTIAL HYPERTENSION, BENIGN: Primary | ICD-10-CM

## 2021-06-16 DIAGNOSIS — Z51.81 ENCOUNTER FOR THERAPEUTIC DRUG MONITORING: ICD-10-CM

## 2021-06-16 DIAGNOSIS — F43.9 STRESS: ICD-10-CM

## 2021-06-16 DIAGNOSIS — Z98.84 HISTORY OF ROUX-EN-Y GASTRIC BYPASS: ICD-10-CM

## 2021-06-16 PROCEDURE — 3079F DIAST BP 80-89 MM HG: CPT | Performed by: INTERNAL MEDICINE

## 2021-06-16 PROCEDURE — 99214 OFFICE O/P EST MOD 30 MIN: CPT | Performed by: INTERNAL MEDICINE

## 2021-06-16 PROCEDURE — 3008F BODY MASS INDEX DOCD: CPT | Performed by: INTERNAL MEDICINE

## 2021-06-16 PROCEDURE — 3074F SYST BP LT 130 MM HG: CPT | Performed by: INTERNAL MEDICINE

## 2021-06-16 NOTE — PROGRESS NOTES
[address], [address]  Dept: [phone]      Patient:  Lawyer Coello  :      1985  MRN:      [id number]    Referring Provider: Taco Capps Potassium 25 MG Oral Tab, Take 1 tablet by mouth every morning., Disp: , Rfl: 2  •  loratadine 10 MG Oral Tab, Take by mouth., Disp: , Rfl:   •  Probiotic Product (PROBIOTIC DAILY OR), Take by mouth., Disp: , Rfl:   •  Calcium 500-125 MG-UNIT Oral Tab, Miguel Felix trace, good movement  Pulses: 2+ and symmetric  Skin: Skin color, texture, turgor normal. No rashes or lesions       ROS:    Constitutional: negative  Respiratory: negative  Cardiovascular: negative  Gastrointestinal: positive for constipation  Musculoskel

## 2021-07-07 ENCOUNTER — HOSPITAL ENCOUNTER (OUTPATIENT)
Age: 36
Discharge: HOME | End: 2021-07-07
Attending: EMERGENCY MEDICINE
Payer: COMMERCIAL

## 2021-07-07 ENCOUNTER — OFFICE VISIT (OUTPATIENT)
Dept: FAMILY MEDICINE CLINIC | Facility: CLINIC | Age: 36
End: 2021-07-07
Payer: COMMERCIAL

## 2021-07-07 VITALS
RESPIRATION RATE: 18 BRPM | BODY MASS INDEX: 34.66 KG/M2 | DIASTOLIC BLOOD PRESSURE: 97 MMHG | WEIGHT: 203 LBS | TEMPERATURE: 98 F | HEIGHT: 64 IN | HEART RATE: 95 BPM | SYSTOLIC BLOOD PRESSURE: 137 MMHG | OXYGEN SATURATION: 98 %

## 2021-07-07 DIAGNOSIS — M54.50 BACK PAIN OF THORACOLUMBAR REGION: Primary | ICD-10-CM

## 2021-07-07 DIAGNOSIS — Z02.9 ADMINISTRATIVE ENCOUNTER: Primary | ICD-10-CM

## 2021-07-07 DIAGNOSIS — M54.6 BACK PAIN OF THORACOLUMBAR REGION: Primary | ICD-10-CM

## 2021-07-07 PROCEDURE — 99213 OFFICE O/P EST LOW 20 MIN: CPT

## 2021-07-07 PROCEDURE — 99203 OFFICE O/P NEW LOW 30 MIN: CPT

## 2021-07-07 RX ORDER — CYCLOBENZAPRINE HCL 10 MG
10 TABLET ORAL 3 TIMES DAILY PRN
Qty: 20 TABLET | Refills: 0 | Status: SHIPPED | OUTPATIENT
Start: 2021-07-07 | End: 2021-07-14

## 2021-07-07 RX ORDER — LIDOCAINE 50 MG/G
1 PATCH TOPICAL EVERY 24 HOURS
Qty: 6 PATCH | Refills: 0 | Status: SHIPPED | OUTPATIENT
Start: 2021-07-07 | End: 2021-07-19

## 2021-07-07 NOTE — PROGRESS NOTES
C/o right sided low back pain without radiation onset yesterday while at work. \" Maybe I pulled something, . I dont know\" Patient thought she was at [facility]. Given address of the [address] [place] [address] [address]. States will go there now. Denies N/T in legs or perineum.  Denies incontinenc

## 2021-07-07 NOTE — ED PROVIDER NOTES
Patient Seen in: Immediate [facility]      History   Patient presents with:  Back Pain    Stated Complaint: c/o low back pain onset yesterday at work.  Denies N/T in toes, or radiation of *    HPI/Subjective:   HPI    Patient is a pleasant 35-year-o src Tympanic   SpO2 98 %   O2 Device None (Room air)       Current:BP (!) 137/97   Pulse 95   Temp 98.2 °F (36.8 °C) (Tympanic)   Resp 18   Ht 162.6 cm (5' 4\")   Wt 92.1 kg   LMP 06/16/2021   SpO2 98%   BMI 34.84 kg/m²         Physical Exam    General: We

## 2021-07-07 NOTE — ED INITIAL ASSESSMENT (HPI)
Pt here c/o rt flank pain since yesterday. States was cleaning a lot yesterday and bending down. Denies any urinary symptoms. Pain increases on mov't.

## 2021-07-16 NOTE — PROGRESS NOTES
[facility] Progress Note      Patient Name:  Santa Clarke  YOB: 1985  Medical Record Number:  [id number]    Date of visit:  7/19/2021    CHIEF COMPLAINT: iron defi anemia.     HPI:     28year old that I initially saw i (MULTIVITAMIN ADULT OR) Take by mouth 2 (two) times daily. • Biotin 75943 MCG Oral Tab Take by mouth.          VITALS:  Height: --  Weight: 105.1 kg (231 lb 9.6 oz) (07/19 1340)  BSA (Calculated - sq m): --  Pulse: 86 (07/19 1340)  BP: 127/80 (07/19 134 Labs 5/21 showed normal hemoglobin but low ferritin. Repeat labs to get a better baseline. Will treat with additional dose of IV iron. She may start oral iron, once every other day with orange juice.   Then recommend labs in 6 months to assess for add

## 2021-07-19 ENCOUNTER — OFFICE VISIT (OUTPATIENT)
Dept: HEMATOLOGY/ONCOLOGY | Age: 36
End: 2021-07-19
Attending: INTERNAL MEDICINE
Payer: COMMERCIAL

## 2021-07-19 ENCOUNTER — TELEPHONE (OUTPATIENT)
Dept: PHYSICAL THERAPY | Facility: HOSPITAL | Age: 36
End: 2021-07-19

## 2021-07-19 VITALS
RESPIRATION RATE: 18 BRPM | SYSTOLIC BLOOD PRESSURE: 127 MMHG | DIASTOLIC BLOOD PRESSURE: 80 MMHG | WEIGHT: 231.63 LBS | TEMPERATURE: 99 F | OXYGEN SATURATION: 97 % | BODY MASS INDEX: 40 KG/M2 | HEART RATE: 86 BPM

## 2021-07-19 DIAGNOSIS — E61.1 IRON DEFICIENCY: Primary | ICD-10-CM

## 2021-07-19 DIAGNOSIS — R79.0 LOW FERRITIN: ICD-10-CM

## 2021-07-19 DIAGNOSIS — Z98.84 S/P GASTRIC BYPASS: ICD-10-CM

## 2021-07-19 LAB
BASOPHILS # BLD AUTO: 0.03 X10(3) UL (ref 0–0.2)
BASOPHILS NFR BLD AUTO: 0.4 %
DEPRECATED HBV CORE AB SER IA-ACNC: 20.7 NG/ML
DEPRECATED RDW RBC AUTO: 47.4 FL (ref 35.1–46.3)
EOSINOPHIL # BLD AUTO: 0.11 X10(3) UL (ref 0–0.7)
EOSINOPHIL NFR BLD AUTO: 1.6 %
ERYTHROCYTE [DISTWIDTH] IN BLOOD BY AUTOMATED COUNT: 13.2 % (ref 11–15)
HCT VFR BLD AUTO: 38 %
HGB BLD-MCNC: 12.5 G/DL
IMM GRANULOCYTES # BLD AUTO: 0.01 X10(3) UL (ref 0–1)
IMM GRANULOCYTES NFR BLD: 0.1 %
IRON SATURATION: 22 %
IRON SERPL-MCNC: 100 UG/DL
LYMPHOCYTES # BLD AUTO: 2.12 X10(3) UL (ref 1–4)
LYMPHOCYTES NFR BLD AUTO: 30.8 %
MCH RBC QN AUTO: 31.6 PG (ref 26–34)
MCHC RBC AUTO-ENTMCNC: 32.9 G/DL (ref 31–37)
MCV RBC AUTO: 96.2 FL
MONOCYTES # BLD AUTO: 0.45 X10(3) UL (ref 0.1–1)
MONOCYTES NFR BLD AUTO: 6.5 %
NEUTROPHILS # BLD AUTO: 4.17 X10 (3) UL (ref 1.5–7.7)
NEUTROPHILS # BLD AUTO: 4.17 X10(3) UL (ref 1.5–7.7)
NEUTROPHILS NFR BLD AUTO: 60.6 %
PLATELET # BLD AUTO: 246 10(3)UL (ref 150–450)
RBC # BLD AUTO: 3.95 X10(6)UL
TOTAL IRON BINDING CAPACITY: 456 UG/DL (ref 240–450)
TRANSFERRIN SERPL-MCNC: 306 MG/DL (ref 200–360)
WBC # BLD AUTO: 6.9 X10(3) UL (ref 4–11)

## 2021-07-19 PROCEDURE — 99214 OFFICE O/P EST MOD 30 MIN: CPT | Performed by: INTERNAL MEDICINE

## 2021-07-19 PROCEDURE — 85025 COMPLETE CBC W/AUTO DIFF WBC: CPT

## 2021-07-19 PROCEDURE — 96374 THER/PROPH/DIAG INJ IV PUSH: CPT

## 2021-07-19 PROCEDURE — 83550 IRON BINDING TEST: CPT

## 2021-07-19 PROCEDURE — 83540 ASSAY OF IRON: CPT

## 2021-07-19 PROCEDURE — 82728 ASSAY OF FERRITIN: CPT

## 2021-07-19 NOTE — PROGRESS NOTES
Pt here for feraheme.   Arrives Ambulating independently, accompanied by Self           Pregnancy screening: Denies possibility of pregnancy    Modifications in dose or schedule: No     Frequency of blood return and site check throughout administration: Ligia

## 2021-07-19 NOTE — PROGRESS NOTES
Education Record    Learner:  Patient    Disease / Sharlette Sovereign      Barriers / Limitations:  None   Comments:    Method:  Discussion   Comments:    General Topics:  Plan of care reviewed   Comments:    Outcome:  Shows understanding   Comments:  Pt fee

## 2021-07-20 ENCOUNTER — OFFICE VISIT (OUTPATIENT)
Dept: PHYSICAL THERAPY | Age: 36
End: 2021-07-20
Attending: FAMILY MEDICINE
Payer: COMMERCIAL

## 2021-07-20 DIAGNOSIS — M62.830 SPASM OF THORACOLUMBAR MUSCLE: ICD-10-CM

## 2021-07-20 PROCEDURE — 97110 THERAPEUTIC EXERCISES: CPT

## 2021-07-20 PROCEDURE — 97161 PT EVAL LOW COMPLEX 20 MIN: CPT

## 2021-07-20 NOTE — PROGRESS NOTES
SPINE EVALUATION:   Referring Physician: Dr. Jessica Du  Diagnosis: Spasm of thoracolumbar muscle (H53.774)     Date of Service: 7/20/2021   MD f/u: N/S    PATIENT [address] is a 28year old female who presents to therapy today with c degrees: (* denotes performed with pain)  AROM hip flex, standin R; 90* L  AROM hip ext, standin R, 0 L  PROM hip flex:  90 R,  120 L  PROM hip IR:  40R,  40 L  PROM hip ER:  60R,  60 L    Strength: (* denotes performed with pain)  LE   Hip flexi bend to the floor. · Pt will have normal tone of thor/lumbar paraspinal muscles to tolerate carrying, lifting. · Pt will demonstrate improved core strength to be able to perform 3, 30 modified sec planks as needed to take garbage out.   · Pt will be indep

## 2021-07-22 ENCOUNTER — OFFICE VISIT (OUTPATIENT)
Dept: PHYSICAL THERAPY | Age: 36
End: 2021-07-22
Attending: FAMILY MEDICINE
Payer: COMMERCIAL

## 2021-07-22 PROCEDURE — 97110 THERAPEUTIC EXERCISES: CPT

## 2021-07-22 PROCEDURE — 97140 MANUAL THERAPY 1/> REGIONS: CPT

## 2021-07-22 NOTE — PROGRESS NOTES
Dx: Spasm of thoracolumbar muscle (M62.830)          Insurance (Authorized # of Visits):  [employer] PPO 25 v, no auth           Authorizing Physician: Dr. Yue Hoff  Next MD visit: none scheduled  Fall Risk: standard         Precautions: n/a             Subjective thor/lumbar PS, R L/S distraction; thor mobs grade III                     HEP: cat back to modified plank, supine marching resisted red band, thor ext in chair.       Charges: 2 there ex, 1 manual       Total Timed Treatment: 45 min  Total Treatment Time:

## 2021-07-26 ENCOUNTER — OFFICE VISIT (OUTPATIENT)
Dept: PHYSICAL THERAPY | Age: 36
End: 2021-07-26
Attending: FAMILY MEDICINE
Payer: COMMERCIAL

## 2021-07-26 PROCEDURE — 97110 THERAPEUTIC EXERCISES: CPT

## 2021-07-26 PROCEDURE — 97140 MANUAL THERAPY 1/> REGIONS: CPT

## 2021-07-26 NOTE — PROGRESS NOTES
Dx: Spasm of thoracolumbar muscle (M62.830)          Insurance (Authorized # of Visits):  BCBS PPO 25 v, no auth           Authorizing Physician: Dr. Serrano Persons  Next MD visit: none scheduled  Fall Risk: standard         Precautions: n/a             Subjective 5x5  Thor ext in chair 5x5 There ex: 35 min  Nustep L3, 5'  Prayer stretch 3 positions 10\"'x3 each, and H neutral spine  Thread the needle 2x5 B  Quadruped hip ext x10 B  Supine marching with red reviewed HEP  TA bracing, BKFO red, on FR 2x10 B  Ball sque

## 2021-07-30 ENCOUNTER — APPOINTMENT (OUTPATIENT)
Dept: PHYSICAL THERAPY | Age: 36
End: 2021-07-30
Attending: FAMILY MEDICINE
Payer: COMMERCIAL

## 2021-08-02 ENCOUNTER — APPOINTMENT (OUTPATIENT)
Dept: PHYSICAL THERAPY | Age: 36
End: 2021-08-02
Payer: COMMERCIAL

## 2021-08-05 ENCOUNTER — APPOINTMENT (OUTPATIENT)
Dept: PHYSICAL THERAPY | Age: 36
End: 2021-08-05
Payer: COMMERCIAL

## 2021-08-10 ENCOUNTER — OFFICE VISIT (OUTPATIENT)
Dept: PHYSICAL THERAPY | Age: 36
End: 2021-08-10
Attending: FAMILY MEDICINE
Payer: COMMERCIAL

## 2021-08-10 PROCEDURE — 97110 THERAPEUTIC EXERCISES: CPT

## 2021-08-10 NOTE — PROGRESS NOTES
Dx: Spasm of thoracolumbar muscle (M62.830)          Insurance (Authorized # of Visits):  BCBS PPO 25 v, no auth           Authorizing Physician: Dr. Jordyn Womack  Next MD visit: none scheduled  Fall Risk: standard         Precautions: n/a             Subjective marching with red 2x10 B  TA bracing, BKFO red 2x10 B  Ball squeeze with bridge 2x10 B  Thor ext w FR 5x5  Thor ext in chair 5x5 There ex: 35 min  Nustep L3, 5'  Prayer stretch 3 positions 10\"'x3 each, and H neutral spine  Thread the needle 2x5 B  Pauly Wynne

## 2021-08-12 ENCOUNTER — OFFICE VISIT (OUTPATIENT)
Dept: PHYSICAL THERAPY | Age: 36
End: 2021-08-12
Attending: FAMILY MEDICINE
Payer: COMMERCIAL

## 2021-08-12 PROCEDURE — 97110 THERAPEUTIC EXERCISES: CPT

## 2021-08-12 NOTE — PROGRESS NOTES
Luzmaria  Pt has attended 5 visits in Physical Therapy.    Dx: Spasm of thoracolumbar muscle (M62.830)          Insurance (Authorized # of Visits):  [facility] PPO 25 v, no [facility] Physician: Dr. Darrius Read  Next MD visit: none scheduled WNL.  Mild gains in core strength. Current/resting back pain now 0/10 was 2/10.     Goals: (to be met in 6 visits)   · Pt will improve transversus abdominis recruitment to perform proper isometric contraction without requiring verbal or tactile cuing or gt B  TA bracing, BKFO red 2x10 B  Ball squeeze with bridge 2x10 B  Thor ext w FR 5x5  Thor ext in chair 5x5 There ex: 35 min  Nustep L3, 5'  Prayer stretch 3 positions 10\"'x3 each, and H neutral spine  Thread the needle 2x5 B  Quadruped hip ext x10 B  Supin

## 2021-10-05 ENCOUNTER — OFFICE VISIT (OUTPATIENT)
Dept: SURGERY | Facility: CLINIC | Age: 36
End: 2021-10-05
Payer: COMMERCIAL

## 2021-10-05 VITALS
HEIGHT: 64 IN | DIASTOLIC BLOOD PRESSURE: 70 MMHG | WEIGHT: 221 LBS | BODY MASS INDEX: 37.73 KG/M2 | SYSTOLIC BLOOD PRESSURE: 112 MMHG

## 2021-10-05 NOTE — PROGRESS NOTES
[address] [facility]  [address] [facility] [address]  Dept: [phone]    10/5/2021    BARIATRIC EXISTING PATIENT/FOLLOW UP    HPI:  Hiren Leyva is a 28year old-year

## 2021-11-10 ENCOUNTER — OFFICE VISIT (OUTPATIENT)
Dept: RHEUMATOLOGY | Facility: CLINIC | Age: 36
End: 2021-11-10
Payer: COMMERCIAL

## 2021-11-10 VITALS
RESPIRATION RATE: 16 BRPM | DIASTOLIC BLOOD PRESSURE: 80 MMHG | HEIGHT: 64 IN | OXYGEN SATURATION: 98 % | HEART RATE: 96 BPM | SYSTOLIC BLOOD PRESSURE: 140 MMHG | TEMPERATURE: 98 F | BODY MASS INDEX: 37.73 KG/M2 | WEIGHT: 221 LBS

## 2021-11-10 DIAGNOSIS — L65.9 HAIR LOSS: ICD-10-CM

## 2021-11-10 DIAGNOSIS — R53.82 CHRONIC FATIGUE: ICD-10-CM

## 2021-11-10 DIAGNOSIS — L60.8 NAIL PITTING: Primary | ICD-10-CM

## 2021-11-10 PROCEDURE — 3077F SYST BP >= 140 MM HG: CPT | Performed by: INTERNAL MEDICINE

## 2021-11-10 PROCEDURE — 3079F DIAST BP 80-89 MM HG: CPT | Performed by: INTERNAL MEDICINE

## 2021-11-10 PROCEDURE — 99214 OFFICE O/P EST MOD 30 MIN: CPT | Performed by: INTERNAL MEDICINE

## 2021-11-10 PROCEDURE — 3008F BODY MASS INDEX DOCD: CPT | Performed by: INTERNAL MEDICINE

## 2021-11-10 NOTE — PROGRESS NOTES
?  RHEUMATOLOGY FOLLOW UP   Date of visit: 11/10/2021  ? Patient presents with: Follow - Up: 10 month f/u. Feeling ok. Got some oral iron supplements and infusion, which has helped with energy level. No other new symptoms.  No joint pain or joint swelling see the patient, obtaining and/or reviewing separately obtained history, performing a medically appropriate examination, counseling and educating the patient/family/caregiver, ordering medications or testing, referring and communicating with other healthca significant hair loss. Was evaluated by dermatologist and told that she does have a small area of possible alopecia. She has since gone through iron transfusion. Has started female rogaine as well as biotin supplementation for the past one month.  But state crampy abdominal pain, constipation, diarrhea, bloody stools, Achilles heel pain, psoriatic lesions, spooning or pitting of the nails, history of dactylitis, or pain awakening the patient from sleep.   There are no symptoms of severe dry eyes, dry mouth, or BY MOUTH AS NEEDED AFTER INTERCOURSE, Disp: 30 capsule, Rfl: 1  Losartan Potassium 25 MG Oral Tab, Take 1 tablet by mouth every morning., Disp: , Rfl: 2  loratadine 10 MG Oral Tab, Take by mouth., Disp: , Rfl:   Probiotic Product (PROBIOTIC DAILY OR), Take Pain   Wt Readings from Last 1 Encounters:  11/10/21 : 221 lb (100.2 kg)   Height: 5' 4\" (162.6 cm) Body mass index is 37.93 kg/m². Body surface area is 2.04 meters squared. Physical Exam  Vitals and nursing note reviewed.    Constitutional: 3.95 07/19/2021    HGB 12.5 07/19/2021    HCT 38.0 07/19/2021    .0 07/19/2021    MPV 9.0 01/02/2018    MCV 96.2 07/19/2021    MCH 31.6 07/19/2021    MCHC 32.9 07/19/2021    RDW 13.2 07/19/2021    NEPRELIM 4.17 07/19/2021    NEPERCENT 60.6 07/19/202

## 2021-11-18 ENCOUNTER — OFFICE VISIT (OUTPATIENT)
Dept: FAMILY MEDICINE CLINIC | Facility: CLINIC | Age: 36
End: 2021-11-18
Payer: COMMERCIAL

## 2021-11-18 VITALS
RESPIRATION RATE: 20 BRPM | WEIGHT: 220 LBS | HEIGHT: 64 IN | TEMPERATURE: 99 F | DIASTOLIC BLOOD PRESSURE: 87 MMHG | SYSTOLIC BLOOD PRESSURE: 130 MMHG | OXYGEN SATURATION: 97 % | BODY MASS INDEX: 37.56 KG/M2 | HEART RATE: 88 BPM

## 2021-11-18 DIAGNOSIS — R19.7 DIARRHEA, UNSPECIFIED TYPE: Primary | ICD-10-CM

## 2021-11-18 PROCEDURE — 3075F SYST BP GE 130 - 139MM HG: CPT | Performed by: NURSE PRACTITIONER

## 2021-11-18 PROCEDURE — 99213 OFFICE O/P EST LOW 20 MIN: CPT | Performed by: NURSE PRACTITIONER

## 2021-11-18 PROCEDURE — 3079F DIAST BP 80-89 MM HG: CPT | Performed by: NURSE PRACTITIONER

## 2021-11-18 PROCEDURE — 3008F BODY MASS INDEX DOCD: CPT | Performed by: NURSE PRACTITIONER

## 2021-11-18 NOTE — PATIENT INSTRUCTIONS
Self-Care for Vomiting and Diarrhea  Vomiting and diarrhea can make you miserable. Your stomach and bowels are reacting to an irritant. This might be food, medicine, or a viral stomach flu.  Vomiting and diarrhea are two ways your body can remove the prob upset. Ask your healthcare provider which medicines may help you.   When to call your healthcare provider  Call your healthcare provider if you have:  · Bloody or black vomit or stools  · Severe, steady belly pain  · Vomiting with a severe headache or stiff hours you may add the following to the above:  · Hot cereal, plain toast, bread, rolls, and crackers  · Plain noodles, rice, mashed potatoes, and chicken noodle or rice soup  · Unsweetened canned fruit (but not pineapple) and bananas  Don't eat more than 1

## 2021-11-18 NOTE — PROGRESS NOTES
CHIEF COMPLAINT:   Patient presents with:  Diarrhea      HPI:   Cecelia Parker is a 28year old female who presents for complaints of diarrhea for 3 days. Reports generalized mild cramping abdominal pain. Symptoms are worsened by eating.  Ge Past Medical History:   Diagnosis Date   • Anxiety state     seeing psychologist-no meds   • Depression     hx of years ago   • H/O gastric bypass 08/21/2017   • High blood pressure    • Obesity, unspecified    • Unspecified sleep apnea     AHI 5 c-p clubbing or edema    ASSESSMENT AND PLAN:   ASSESSMENT:  Diarrhea, unspecified type  (primary encounter diagnosis)  1. Diarrhea, unspecified type  - SARS-COV-2 BY PCR ([facility]INITY); Future  - SARS-COV-2 BY PCR ([facility]INITY)    PLAN: Proper diet discussed.   Instruc away from any foods. Medicines  Know the following about medicines:  · Vomiting and diarrhea are ways your body uses to rid itself of harmful substances such as bacteria.  Don't use antidiarrheal or antivomiting (antiemetic) medicines unless your healthcar vomiting stops, follow the steps below.   During the first 12 to 24 hours  During the first 12 to 24 hours, follow this diet:  · Drinks. Plain water, sport drinks like electrolyte solutions, drinks without caffeine, mineral water (plain or flavored), and c

## 2021-12-13 ENCOUNTER — OFFICE VISIT (OUTPATIENT)
Dept: SURGERY | Facility: CLINIC | Age: 36
End: 2021-12-13
Payer: COMMERCIAL

## 2021-12-13 VITALS
OXYGEN SATURATION: 99 % | SYSTOLIC BLOOD PRESSURE: 126 MMHG | HEIGHT: 64 IN | BODY MASS INDEX: 37.3 KG/M2 | DIASTOLIC BLOOD PRESSURE: 68 MMHG | HEART RATE: 93 BPM | WEIGHT: 218.5 LBS

## 2021-12-13 DIAGNOSIS — L30.4 INTERTRIGO: ICD-10-CM

## 2021-12-13 DIAGNOSIS — Z98.84 HISTORY OF ROUX-EN-Y GASTRIC BYPASS: ICD-10-CM

## 2021-12-13 DIAGNOSIS — F43.9 STRESS: ICD-10-CM

## 2021-12-13 DIAGNOSIS — I10 ESSENTIAL HYPERTENSION, BENIGN: Primary | ICD-10-CM

## 2021-12-13 DIAGNOSIS — E66.9 OBESITY (BMI 30-39.9): ICD-10-CM

## 2021-12-13 PROCEDURE — 3074F SYST BP LT 130 MM HG: CPT | Performed by: INTERNAL MEDICINE

## 2021-12-13 PROCEDURE — 3008F BODY MASS INDEX DOCD: CPT | Performed by: INTERNAL MEDICINE

## 2021-12-13 PROCEDURE — 3078F DIAST BP <80 MM HG: CPT | Performed by: INTERNAL MEDICINE

## 2021-12-13 PROCEDURE — 99214 OFFICE O/P EST MOD 30 MIN: CPT | Performed by: INTERNAL MEDICINE

## 2021-12-13 NOTE — PROGRESS NOTES
[address]  Dept: [phone]      Patient:  Jay Santos  :      1985  MRN:      [id number]    Referring Provider: Petar Rothman Allergies:  Penicillin G and Sulfa Antibiotics     Social History:  Social History    Tobacco Use      Smoking status: Former Smoker        Years: 12.00        Quit date: 2016        Years since quittin.3      Smokeless tobacco: Never Used    V (primary encounter diagnosis)  Stress  History of kathie-en-y gastric bypass  Encounter for therapeutic drug monitoring  Obesity (bmi 30-39. 9)    Plan     S/p kathie en y 08/21/2017    HYPERTENSION: Blood pressure stable on the above medications.  No interval c

## 2022-01-07 ENCOUNTER — LAB ENCOUNTER (OUTPATIENT)
Dept: LAB | Age: 37
End: 2022-01-07
Attending: FAMILY MEDICINE
Payer: COMMERCIAL

## 2022-01-07 ENCOUNTER — TELEMEDICINE (OUTPATIENT)
Dept: FAMILY MEDICINE CLINIC | Facility: CLINIC | Age: 37
End: 2022-01-07
Payer: COMMERCIAL

## 2022-01-07 DIAGNOSIS — R50.9 FEVER, UNSPECIFIED FEVER CAUSE: ICD-10-CM

## 2022-01-07 DIAGNOSIS — J02.9 SORE THROAT: ICD-10-CM

## 2022-01-07 DIAGNOSIS — Z20.822 EXPOSURE TO COVID-19 VIRUS: Primary | ICD-10-CM

## 2022-01-07 DIAGNOSIS — Z20.822 EXPOSURE TO COVID-19 VIRUS: ICD-10-CM

## 2022-01-07 PROCEDURE — 99214 OFFICE O/P EST MOD 30 MIN: CPT | Performed by: FAMILY MEDICINE

## 2022-01-07 RX ORDER — AZITHROMYCIN 250 MG/1
TABLET, FILM COATED ORAL
Qty: 6 TABLET | Refills: 0 | Status: SHIPPED | OUTPATIENT
Start: 2022-01-07 | End: 2022-01-12

## 2022-01-07 NOTE — PROGRESS NOTES
Video Visit- Ashanti Elliott  This is a telemedicine visit with live, interactive video and audio.      Eli Hernández understands and accepts financial responsibility for any deductible, co-insurance and/o obvious goiter  PULMONARY:  Speaking in full sentences comfortably, normal work of breathing  ABDOMEN:  + obese  MUSCULOSKELETAL: Sitting upright. NEUROLOGIC:  Cranial nerves 2-12 grossly intact.   PSYCHIATRIC:  Normal mood, affect, wrapped in blanket wit

## 2022-01-07 NOTE — PATIENT INSTRUCTIONS
Coronavirus Disease 2019 (COVID-19): Caring for Yourself or Others   If you or a household member have symptoms of COVID-19, follow these guidelines for preventing spread of the virus and managing symptoms.  This is regardless of your vaccination status contact with. · Follow all instructions the healthcare staff give you. If you have been diagnosed with COVID-19  · Stay home and start self-isolation. Don’t leave your home unless you need to get medical care. Don't go to work, school, or public areas. pregnant or breastfeeding people to be vaccinated. Talk with your healthcare provider about which vaccine is best for you and your family. Treatment  Current treatment is mainly aimed at helping your body while it fights the virus.  This is known as suppo risk for severe COVID-19 and a hospital stay. This includes people who are 65 years and older and people with certain chronic conditions.  Monoclonal antibody therapy is not approved for people who:   · Are in the hospital with COVID-19, or  · Need oxygen t when all 3 of these are true:   1. You have had no fever for at least 24 hours. This means no fever without medicine that reduces fever, such as acetaminophen, for at least 24 hours. 2. Your symptoms such as cough or trouble breathing have improved.   3. I time. This leads to variants that are easily spread, including the delta variant.  To protect against variants, CDC advises all people over age 3, including those who are fully vaccinated, to wear a mask indoors in public settings if you are in an area of h (strep). This is often called strep throat. This is diagnosed by either a rapid strep test, which gives immediate results, or a throat culture, or both.  Strep throat can cause throat pain that is worse when swallowing, aching all over, headache, swollen ly care  Follow up with your healthcare provider or our staff if you don't feel better in 72 hours, or as directed.    When to get medical advice  Call your healthcare provider right away if any of these occur:   · Fever of 100.4°F (38°C), or higher, or as dir

## 2022-01-09 LAB — SARS-COV-2 RNA RESP QL NAA+PROBE: NOT DETECTED

## 2022-01-10 ENCOUNTER — PATIENT MESSAGE (OUTPATIENT)
Dept: FAMILY MEDICINE CLINIC | Facility: CLINIC | Age: 37
End: 2022-01-10

## 2022-01-10 ENCOUNTER — HOSPITAL ENCOUNTER (EMERGENCY)
Age: 37
Discharge: HOME | End: 2022-01-10
Attending: EMERGENCY MEDICINE
Payer: COMMERCIAL

## 2022-01-10 VITALS
DIASTOLIC BLOOD PRESSURE: 72 MMHG | TEMPERATURE: 98 F | BODY MASS INDEX: 37.56 KG/M2 | HEART RATE: 86 BPM | WEIGHT: 220 LBS | HEIGHT: 64 IN | RESPIRATION RATE: 16 BRPM | OXYGEN SATURATION: 100 % | SYSTOLIC BLOOD PRESSURE: 116 MMHG

## 2022-01-10 DIAGNOSIS — J02.9 VIRAL PHARYNGITIS: Primary | ICD-10-CM

## 2022-01-10 LAB
ALBUMIN SERPL-MCNC: 3.1 G/DL (ref 3.4–5)
ALBUMIN/GLOB SERPL: 0.7 {RATIO} (ref 1–2)
ALP LIVER SERPL-CCNC: 72 U/L
ALT SERPL-CCNC: 14 U/L
ANION GAP SERPL CALC-SCNC: 4 MMOL/L (ref 0–18)
AST SERPL-CCNC: 12 U/L (ref 15–37)
BASOPHILS # BLD AUTO: 0.04 X10(3) UL (ref 0–0.2)
BASOPHILS NFR BLD AUTO: 0.7 %
BILIRUB SERPL-MCNC: 0.4 MG/DL (ref 0.1–2)
BUN BLD-MCNC: 5 MG/DL (ref 7–18)
CALCIUM BLD-MCNC: 8.9 MG/DL (ref 8.5–10.1)
CHLORIDE SERPL-SCNC: 103 MMOL/L (ref 98–112)
CO2 SERPL-SCNC: 33 MMOL/L (ref 21–32)
CREAT BLD-MCNC: 0.55 MG/DL
EOSINOPHIL # BLD AUTO: 0.04 X10(3) UL (ref 0–0.7)
EOSINOPHIL NFR BLD AUTO: 0.7 %
ERYTHROCYTE [DISTWIDTH] IN BLOOD BY AUTOMATED COUNT: 12.1 %
GLOBULIN PLAS-MCNC: 4.7 G/DL (ref 2.8–4.4)
GLUCOSE BLD-MCNC: 91 MG/DL (ref 70–99)
HCT VFR BLD AUTO: 42.7 %
HGB BLD-MCNC: 14.5 G/DL
IMM GRANULOCYTES # BLD AUTO: 0.02 X10(3) UL (ref 0–1)
IMM GRANULOCYTES NFR BLD: 0.3 %
LYMPHOCYTES # BLD AUTO: 1.21 X10(3) UL (ref 1–4)
LYMPHOCYTES NFR BLD AUTO: 20.2 %
MCH RBC QN AUTO: 32.5 PG (ref 26–34)
MCHC RBC AUTO-ENTMCNC: 34 G/DL (ref 31–37)
MCV RBC AUTO: 95.7 FL
MONOCYTES # BLD AUTO: 0.51 X10(3) UL (ref 0.1–1)
MONOCYTES NFR BLD AUTO: 8.5 %
MONOSCREEN: NEGATIVE
NEUTROPHILS # BLD AUTO: 4.16 X10 (3) UL (ref 1.5–7.7)
NEUTROPHILS # BLD AUTO: 4.16 X10(3) UL (ref 1.5–7.7)
NEUTROPHILS NFR BLD AUTO: 69.6 %
OSMOLALITY SERPL CALC.SUM OF ELEC: 287 MOSM/KG (ref 275–295)
PLATELET # BLD AUTO: 227 10(3)UL (ref 150–450)
POTASSIUM SERPL-SCNC: 3.1 MMOL/L (ref 3.5–5.1)
PROT SERPL-MCNC: 7.8 G/DL (ref 6.4–8.2)
RBC # BLD AUTO: 4.46 X10(6)UL
SARS-COV-2 RNA RESP QL NAA+PROBE: NOT DETECTED
SODIUM SERPL-SCNC: 140 MMOL/L (ref 136–145)
WBC # BLD AUTO: 6 X10(3) UL (ref 4–11)

## 2022-01-10 PROCEDURE — 96361 HYDRATE IV INFUSION ADD-ON: CPT | Performed by: PHYSICIAN ASSISTANT

## 2022-01-10 PROCEDURE — 85025 COMPLETE CBC W/AUTO DIFF WBC: CPT | Performed by: PHYSICIAN ASSISTANT

## 2022-01-10 PROCEDURE — 99284 EMERGENCY DEPT VISIT MOD MDM: CPT | Performed by: PHYSICIAN ASSISTANT

## 2022-01-10 PROCEDURE — 80053 COMPREHEN METABOLIC PANEL: CPT | Performed by: PHYSICIAN ASSISTANT

## 2022-01-10 PROCEDURE — 86403 PARTICLE AGGLUT ANTBDY SCRN: CPT | Performed by: PHYSICIAN ASSISTANT

## 2022-01-10 PROCEDURE — 96374 THER/PROPH/DIAG INJ IV PUSH: CPT | Performed by: PHYSICIAN ASSISTANT

## 2022-01-10 RX ORDER — DEXAMETHASONE SODIUM PHOSPHATE 4 MG/ML
10 VIAL (ML) INJECTION ONCE
Status: COMPLETED | OUTPATIENT
Start: 2022-01-10 | End: 2022-01-10

## 2022-01-10 RX ORDER — POTASSIUM CHLORIDE 20 MEQ/1
40 TABLET, EXTENDED RELEASE ORAL ONCE
Status: COMPLETED | OUTPATIENT
Start: 2022-01-10 | End: 2022-01-10

## 2022-01-10 NOTE — ED INITIAL ASSESSMENT (HPI)
Symptoms since Wednesday - video visit with MD - treated for strep - had negative covid - still c/o sore throat

## 2022-01-10 NOTE — ED PROVIDER NOTES
Patient Seen in: THE [facility] Emergency Department In [place]      History   Patient presents with:  Sore Throat    Stated Complaint: SORE THROAT    Subjective:   HPI    CHIEF COMPLAINT: Sore throat     HISTORY OF PRESENT ILLNESS: Patient is a pleasant 36-y Obesity, unspecified    • Unspecified sleep apnea     AHI 5 c-pap   • Visual impairment     lasik 6/17 - no glasses              Past Surgical History:   Procedure Laterality Date   • ANKLE FRACTURE SURGERY  2008    Rt.     • GASTRIC BYPASS,OBESE<100CM MOE tender. no meningeal signs.   Extremities are symmetrical, full range of motion  Psychiatric: calm and cooperative      ED Course     Labs Reviewed   COMP METABOLIC PANEL (14) - Abnormal; Notable for the following components:       Result Value    Potassium previously prescribed. Take Tylenol as needed for pain. Salt water gargles as needed for comfort. Warm tea with honey. Follow up with your primary doctor. If you have new, changing or worsening symptoms, please go directly to the ER.     This izzy

## 2022-01-11 NOTE — TELEPHONE ENCOUNTER
From: Ann-Marie Ulloa  To: Maame Gracia MD  Sent: 1/10/2022 1:17 PM CST  Subject: Mouthwash Prescription     Hi, Dr. Tammy Valdez!   When you have the opportunity, can you please send a prescription for the mouthwash to help alleviate my throat p

## 2022-03-05 ENCOUNTER — LAB ENCOUNTER (OUTPATIENT)
Dept: LAB | Age: 37
End: 2022-03-05
Attending: INTERNAL MEDICINE
Payer: COMMERCIAL

## 2022-03-05 DIAGNOSIS — E78.2 MIXED HYPERLIPIDEMIA: Primary | ICD-10-CM

## 2022-03-05 DIAGNOSIS — E61.1 IRON DEFICIENCY: ICD-10-CM

## 2022-03-05 LAB
ALBUMIN SERPL-MCNC: 3.1 G/DL (ref 3.4–5)
ALT SERPL-CCNC: 12 U/L
ANION GAP SERPL CALC-SCNC: 5 MMOL/L (ref 0–18)
AST SERPL-CCNC: 18 U/L (ref 15–37)
BASOPHILS NFR BLD AUTO: 1.5 %
BILIRUB SERPL-MCNC: 0.6 MG/DL (ref 0.1–2)
BUN BLD-MCNC: 11 MG/DL (ref 7–18)
CALCIUM BLD-MCNC: 8.9 MG/DL (ref 8.5–10.1)
CHLORIDE SERPL-SCNC: 108 MMOL/L (ref 98–112)
CHOLEST SERPL-MCNC: 149 MG/DL (ref ?–200)
CO2 SERPL-SCNC: 27 MMOL/L (ref 21–32)
CREAT BLD-MCNC: 0.58 MG/DL
DEPRECATED HBV CORE AB SER IA-ACNC: 61.5 NG/ML
EOSINOPHIL # BLD AUTO: 0.28 X10(3) UL (ref 0–0.7)
EOSINOPHIL NFR BLD AUTO: 4.6 %
ERYTHROCYTE [DISTWIDTH] IN BLOOD BY AUTOMATED COUNT: 13.2 %
FASTING PATIENT LIPID ANSWER: YES
FASTING STATUS PATIENT QL REPORTED: YES
GLOBULIN PLAS-MCNC: 3.5 G/DL (ref 2.8–4.4)
GLUCOSE BLD-MCNC: 84 MG/DL (ref 70–99)
HDLC SERPL-MCNC: 38 MG/DL (ref 40–59)
HGB BLD-MCNC: 12.1 G/DL
IMM GRANULOCYTES # BLD AUTO: 0.02 X10(3) UL (ref 0–1)
IRON SATN MFR SERPL: 39 %
IRON SERPL-MCNC: 159 UG/DL
LDLC SERPL CALC-MCNC: 95 MG/DL (ref ?–100)
LYMPHOCYTES # BLD AUTO: 2.07 X10(3) UL (ref 1–4)
LYMPHOCYTES NFR BLD AUTO: 33.7 %
MCHC RBC AUTO-ENTMCNC: 33 G/DL (ref 31–37)
MCV RBC AUTO: 98.4 FL
MONOCYTES # BLD AUTO: 0.39 X10(3) UL (ref 0.1–1)
MONOCYTES NFR BLD AUTO: 6.3 %
NEUTROPHILS # BLD AUTO: 3.3 X10 (3) UL (ref 1.5–7.7)
NEUTROPHILS # BLD AUTO: 3.3 X10(3) UL (ref 1.5–7.7)
NEUTROPHILS NFR BLD AUTO: 53.6 %
NONHDLC SERPL-MCNC: 111 MG/DL (ref ?–130)
OSMOLALITY SERPL CALC.SUM OF ELEC: 289 MOSM/KG (ref 275–295)
PLATELET # BLD AUTO: 228 10(3)UL (ref 150–450)
POTASSIUM SERPL-SCNC: 4.3 MMOL/L (ref 3.5–5.1)
PROT SERPL-MCNC: 6.6 G/DL (ref 6.4–8.2)
RBC # BLD AUTO: 3.73 X10(6)UL
SODIUM SERPL-SCNC: 140 MMOL/L (ref 136–145)
TIBC SERPL-MCNC: 411 UG/DL (ref 240–450)
TRANSFERRIN SERPL-MCNC: 276 MG/DL (ref 200–360)
TRIGL SERPL-MCNC: 82 MG/DL (ref 30–149)
VLDLC SERPL CALC-MCNC: 13 MG/DL (ref 0–30)
WBC # BLD AUTO: 6.2 X10(3) UL (ref 4–11)

## 2022-03-05 PROCEDURE — 80053 COMPREHEN METABOLIC PANEL: CPT

## 2022-03-05 PROCEDURE — 80061 LIPID PANEL: CPT

## 2022-03-05 PROCEDURE — 85025 COMPLETE CBC W/AUTO DIFF WBC: CPT

## 2022-03-05 PROCEDURE — 83540 ASSAY OF IRON: CPT

## 2022-03-05 PROCEDURE — 83550 IRON BINDING TEST: CPT

## 2022-03-05 PROCEDURE — 82728 ASSAY OF FERRITIN: CPT

## 2022-03-05 PROCEDURE — 36415 COLL VENOUS BLD VENIPUNCTURE: CPT

## 2022-04-12 ENCOUNTER — OFFICE VISIT (OUTPATIENT)
Dept: FAMILY MEDICINE CLINIC | Facility: CLINIC | Age: 37
End: 2022-04-12
Payer: COMMERCIAL

## 2022-04-12 VITALS
TEMPERATURE: 97 F | RESPIRATION RATE: 18 BRPM | DIASTOLIC BLOOD PRESSURE: 82 MMHG | OXYGEN SATURATION: 97 % | SYSTOLIC BLOOD PRESSURE: 128 MMHG | BODY MASS INDEX: 37.56 KG/M2 | HEIGHT: 64 IN | WEIGHT: 220 LBS | HEART RATE: 90 BPM

## 2022-04-12 DIAGNOSIS — J06.9 VIRAL URI: ICD-10-CM

## 2022-04-12 DIAGNOSIS — J02.9 SORE THROAT: Primary | ICD-10-CM

## 2022-04-12 LAB
CONTROL LINE PRESENT WITH A CLEAR BACKGROUND (YES/NO): YES YES/NO
OPERATOR ID: NORMAL
POCT LOT NUMBER: NORMAL
RAPID SARS-COV-2 BY PCR: NOT DETECTED

## 2022-04-12 PROCEDURE — 3074F SYST BP LT 130 MM HG: CPT | Performed by: NURSE PRACTITIONER

## 2022-04-12 PROCEDURE — U0002 COVID-19 LAB TEST NON-CDC: HCPCS | Performed by: NURSE PRACTITIONER

## 2022-04-12 PROCEDURE — 3008F BODY MASS INDEX DOCD: CPT | Performed by: NURSE PRACTITIONER

## 2022-04-12 PROCEDURE — 3079F DIAST BP 80-89 MM HG: CPT | Performed by: NURSE PRACTITIONER

## 2022-04-12 PROCEDURE — 99213 OFFICE O/P EST LOW 20 MIN: CPT | Performed by: NURSE PRACTITIONER

## 2022-04-12 PROCEDURE — 87880 STREP A ASSAY W/OPTIC: CPT | Performed by: NURSE PRACTITIONER

## 2022-06-20 ENCOUNTER — TELEPHONE (OUTPATIENT)
Dept: FAMILY MEDICINE CLINIC | Facility: CLINIC | Age: 37
End: 2022-06-20

## 2022-06-20 DIAGNOSIS — R09.81 NASAL CONGESTION: ICD-10-CM

## 2022-06-20 DIAGNOSIS — R19.5 LOOSE STOOLS: ICD-10-CM

## 2022-06-20 DIAGNOSIS — J06.9 VIRAL UPPER RESPIRATORY TRACT INFECTION: Primary | ICD-10-CM

## 2022-06-20 NOTE — TELEPHONE ENCOUNTER
Can do virtually, I can also put in covid test for her now if she wants (or can do rapid otc)    London Card, DO  Family Medicine

## 2022-06-20 NOTE — TELEPHONE ENCOUNTER
Advised pt of Dr. Jaci Pretty recommendations, pt verbalized understanding and would like to have Covid test ordered. Pt is in driving at the moment, this writer will send pt a message with central scheduling phone number to schedule Covid test, pt expressed appreciation for call back. Pt states she has been experiencing congestion and loose stools. Tagoodies message sent to pt.

## 2022-06-20 NOTE — TELEPHONE ENCOUNTER
Future Appointments   Date Time Provider Nba Condei   6/21/2022 12:30 PM Pete Cortes, DO [facility] [address] [facility] [address]     Patient traveled to [facility] from 6-10 through 6-18, patient has upcoming appointment. Patient has a stuffy nose,congestion with slight cough. Patient was not required to test upon return, please advise on this appointment-would patient be ok to be in-person visit.

## 2022-06-21 ENCOUNTER — TELEMEDICINE (OUTPATIENT)
Dept: FAMILY MEDICINE CLINIC | Facility: CLINIC | Age: 37
End: 2022-06-21

## 2022-06-21 DIAGNOSIS — J34.89 NASAL PAIN: Primary | ICD-10-CM

## 2022-06-21 PROCEDURE — 99213 OFFICE O/P EST LOW 20 MIN: CPT | Performed by: FAMILY MEDICINE

## 2022-06-21 RX ORDER — VALACYCLOVIR HYDROCHLORIDE 1 G/1
TABLET, FILM COATED ORAL
Qty: 30 TABLET | Refills: 1 | Status: SHIPPED | OUTPATIENT
Start: 2022-06-21

## 2022-06-23 ENCOUNTER — OFFICE VISIT (OUTPATIENT)
Dept: FAMILY MEDICINE CLINIC | Facility: CLINIC | Age: 37
End: 2022-06-23
Payer: COMMERCIAL

## 2022-06-23 ENCOUNTER — PATIENT MESSAGE (OUTPATIENT)
Dept: FAMILY MEDICINE CLINIC | Facility: CLINIC | Age: 37
End: 2022-06-23

## 2022-06-23 DIAGNOSIS — R09.81 NASAL CONGESTION: ICD-10-CM

## 2022-06-23 DIAGNOSIS — N30.00 ACUTE CYSTITIS WITHOUT HEMATURIA: Primary | ICD-10-CM

## 2022-06-23 LAB
GLUCOSE (URINE DIPSTICK): NEGATIVE MG/DL
MULTISTIX LOT#: ABNORMAL NUMERIC
NITRITE, URINE: NEGATIVE
PH, URINE: 6.5 (ref 4.5–8)
PROTEIN (URINE DIPSTICK): 100 MG/DL
SPECIFIC GRAVITY: 1.03 (ref 1–1.03)
URINE-COLOR: YELLOW
UROBILINOGEN,SEMI-QN: 1 MG/DL (ref 0–1.9)

## 2022-06-23 PROCEDURE — 81003 URINALYSIS AUTO W/O SCOPE: CPT | Performed by: PHYSICIAN ASSISTANT

## 2022-06-23 PROCEDURE — 87086 URINE CULTURE/COLONY COUNT: CPT | Performed by: PHYSICIAN ASSISTANT

## 2022-06-23 PROCEDURE — 99213 OFFICE O/P EST LOW 20 MIN: CPT | Performed by: PHYSICIAN ASSISTANT

## 2022-06-23 RX ORDER — NITROFURANTOIN 25; 75 MG/1; MG/1
100 CAPSULE ORAL 2 TIMES DAILY
Qty: 14 CAPSULE | Refills: 0 | Status: SHIPPED | OUTPATIENT
Start: 2022-06-23 | End: 2022-06-30

## 2022-06-23 NOTE — TELEPHONE ENCOUNTER
From: Rhett Munoz  To: Ny Peterson DO  Sent: 6/23/2022 7:37 AM CDT  Subject: New Symptoms     Good morning, Dr. Rita Concepcion! Thank you so much for meeting with me virtually yesterday! Since our conversation, Pranav Pittman developed signs of a UTI (painful urination, frequently). Would you be able to prescribe me something to help? Or would you prefer that I make an appointment to come in and see you? Sending sunshine!   Bard Bhardwaj

## 2022-06-24 LAB — SARS-COV-2 RNA RESP QL NAA+PROBE: DETECTED

## 2022-06-28 ENCOUNTER — TELEPHONE (OUTPATIENT)
Dept: FAMILY MEDICINE CLINIC | Facility: CLINIC | Age: 37
End: 2022-06-28

## 2022-06-28 NOTE — TELEPHONE ENCOUNTER
Patient states she has covid, would like to go over quarantine information, please advise. Declined a VV.

## 2022-06-28 NOTE — TELEPHONE ENCOUNTER
Spoke to patient. Sx started 6/15/22. Tested positive for covid 6/23/22. Advised patient on quarantine guidelines. Please follow the current CDC recommendations:     \" Stay home for 5 days  \" You can leave the house after 5 days if you have no symptoms or your symptoms are resolving  \" If you have a fever, continue to stay home until your fever resolves. \" Continue to wear a mask around others for 5 additional days. \" Other symptoms of COVID-19 are improving**Loss of taste and smell may persist for weeks or months after recovery and need not delay the end of isolation     Patient verbalized understanding. Patient states she is feeling better. Patient thankful for phone call.

## 2022-07-08 ENCOUNTER — OFFICE VISIT (OUTPATIENT)
Dept: FAMILY MEDICINE CLINIC | Facility: CLINIC | Age: 37
End: 2022-07-08
Payer: COMMERCIAL

## 2022-07-08 VITALS
SYSTOLIC BLOOD PRESSURE: 122 MMHG | DIASTOLIC BLOOD PRESSURE: 60 MMHG | BODY MASS INDEX: 37.56 KG/M2 | HEART RATE: 87 BPM | TEMPERATURE: 97 F | OXYGEN SATURATION: 99 % | WEIGHT: 220 LBS | HEIGHT: 64 IN

## 2022-07-08 DIAGNOSIS — N30.01 ACUTE CYSTITIS WITH HEMATURIA: Primary | ICD-10-CM

## 2022-07-08 LAB
APPEARANCE: CLEAR
BILIRUBIN: NEGATIVE
GLUCOSE (URINE DIPSTICK): NEGATIVE MG/DL
KETONES (URINE DIPSTICK): NEGATIVE MG/DL
MULTISTIX LOT#: ABNORMAL NUMERIC
NITRITE, URINE: NEGATIVE
PH, URINE: 6 (ref 4.5–8)
PROTEIN (URINE DIPSTICK): 100 MG/DL
SPECIFIC GRAVITY: 1.02 (ref 1–1.03)
URINE-COLOR: YELLOW
UROBILINOGEN,SEMI-QN: 0.2 MG/DL (ref 0–1.9)

## 2022-07-08 PROCEDURE — 87086 URINE CULTURE/COLONY COUNT: CPT | Performed by: NURSE PRACTITIONER

## 2022-07-08 RX ORDER — NITROFURANTOIN 25; 75 MG/1; MG/1
100 CAPSULE ORAL 2 TIMES DAILY
Qty: 14 CAPSULE | Refills: 0 | Status: SHIPPED | OUTPATIENT
Start: 2022-07-08 | End: 2022-07-15

## 2022-07-19 ENCOUNTER — TELEPHONE (OUTPATIENT)
Dept: FAMILY MEDICINE CLINIC | Facility: CLINIC | Age: 37
End: 2022-07-19

## 2022-07-19 NOTE — TELEPHONE ENCOUNTER
Dr. Kali Humphreys- Pt is having vaginal discomfort during intercourse. Also feel discomfort when crossing legs. Constant awareness. Pt was treated for a UTI. No appointment available.     Please advise [phone]

## 2022-07-19 NOTE — TELEPHONE ENCOUNTER
Pt states she was treated for a UTI at the end of June for urinary frequency and urgency which resolved. Pt states she was seen again after being intimate with fiance about 1 week after completing UTI treatment and experiencing discomfort with intercourse and vaginal discomfort. Pt states pt is 1/10 but she is \"more aware of that region of my body since being treated for the UTI, I'm not sure if it is my anxiety for what so I think I need to get it checked out. \" Offered pt appointment with PCP and pt scheduled.    Future Appointments   Date Time Provider Nba Uriarte   7/22/2022 10:30 AM [place], [place],  [facility] [address] [facility] [address]

## 2022-07-22 ENCOUNTER — OFFICE VISIT (OUTPATIENT)
Dept: FAMILY MEDICINE CLINIC | Facility: CLINIC | Age: 37
End: 2022-07-22
Payer: COMMERCIAL

## 2022-07-22 VITALS
WEIGHT: 220 LBS | DIASTOLIC BLOOD PRESSURE: 82 MMHG | RESPIRATION RATE: 14 BRPM | OXYGEN SATURATION: 98 % | HEART RATE: 88 BPM | BODY MASS INDEX: 37.56 KG/M2 | HEIGHT: 64 IN | TEMPERATURE: 97 F | SYSTOLIC BLOOD PRESSURE: 122 MMHG

## 2022-07-22 DIAGNOSIS — Z00.00 ANNUAL PHYSICAL EXAM: Primary | ICD-10-CM

## 2022-07-22 DIAGNOSIS — Z13.31 NEGATIVE DEPRESSION SCREENING: ICD-10-CM

## 2022-07-22 DIAGNOSIS — N39.0 RECURRENT UTI: ICD-10-CM

## 2022-07-22 DIAGNOSIS — N89.8 VAGINA ITCHING: ICD-10-CM

## 2022-07-22 PROCEDURE — 3008F BODY MASS INDEX DOCD: CPT | Performed by: FAMILY MEDICINE

## 2022-07-22 PROCEDURE — 99395 PREV VISIT EST AGE 18-39: CPT | Performed by: FAMILY MEDICINE

## 2022-07-22 PROCEDURE — 3079F DIAST BP 80-89 MM HG: CPT | Performed by: FAMILY MEDICINE

## 2022-07-22 PROCEDURE — 87480 CANDIDA DNA DIR PROBE: CPT | Performed by: FAMILY MEDICINE

## 2022-07-22 PROCEDURE — 3074F SYST BP LT 130 MM HG: CPT | Performed by: FAMILY MEDICINE

## 2022-07-22 PROCEDURE — 99213 OFFICE O/P EST LOW 20 MIN: CPT | Performed by: FAMILY MEDICINE

## 2022-07-22 PROCEDURE — 87510 GARDNER VAG DNA DIR PROBE: CPT | Performed by: FAMILY MEDICINE

## 2022-07-22 PROCEDURE — 87660 TRICHOMONAS VAGIN DIR PROBE: CPT | Performed by: FAMILY MEDICINE

## 2022-07-22 RX ORDER — FLUCONAZOLE 150 MG/1
TABLET ORAL
Qty: 2 TABLET | Refills: 0 | Status: SHIPPED | OUTPATIENT
Start: 2022-07-22

## 2022-10-25 NOTE — TELEPHONE ENCOUNTER
[facility] Medication Refill Protocol criteria for refilling levothyroxine :Passed    Last Refill: 4/5/22  Last Office Visit for this diagnosis or CPE/MWV/PREOP: 8/29/22  (Return in 1 year)  Next Appt:  none  Pertinent labs UTD: Yes             Prescription does not require PDMP check       Rx Refilled         Cardbackt msg sent to pt.

## 2022-10-27 ENCOUNTER — OFFICE VISIT (OUTPATIENT)
Dept: FAMILY MEDICINE CLINIC | Facility: CLINIC | Age: 37
End: 2022-10-27
Payer: COMMERCIAL

## 2022-10-27 VITALS
OXYGEN SATURATION: 99 % | RESPIRATION RATE: 18 BRPM | HEIGHT: 64 IN | DIASTOLIC BLOOD PRESSURE: 86 MMHG | SYSTOLIC BLOOD PRESSURE: 132 MMHG | BODY MASS INDEX: 36.7 KG/M2 | TEMPERATURE: 98 F | HEART RATE: 81 BPM | WEIGHT: 215 LBS

## 2022-10-27 DIAGNOSIS — R39.9 UTI SYMPTOMS: Primary | ICD-10-CM

## 2022-10-27 LAB
APPEARANCE: CLEAR
BILIRUBIN: NEGATIVE
CONTROL LINE PRESENT WITH A CLEAR BACKGROUND (YES/NO): YES YES/NO
GLUCOSE (URINE DIPSTICK): NEGATIVE MG/DL
KETONES (URINE DIPSTICK): NEGATIVE MG/DL
LEUKOCYTES: NEGATIVE
MULTISTIX LOT#: NORMAL NUMERIC
NITRITE, URINE: NEGATIVE
OCCULT BLOOD: NEGATIVE
PH, URINE: 6.5 (ref 4.5–8)
PREGNANCY TEST, URINE: NEGATIVE
PROTEIN (URINE DIPSTICK): NEGATIVE MG/DL
SPECIFIC GRAVITY: 1.02 (ref 1–1.03)
URINE-COLOR: YELLOW
UROBILINOGEN,SEMI-QN: 0.2 MG/DL (ref 0–1.9)

## 2022-10-27 PROCEDURE — 81003 URINALYSIS AUTO W/O SCOPE: CPT | Performed by: NURSE PRACTITIONER

## 2022-10-27 PROCEDURE — 99213 OFFICE O/P EST LOW 20 MIN: CPT | Performed by: NURSE PRACTITIONER

## 2022-10-27 PROCEDURE — 3075F SYST BP GE 130 - 139MM HG: CPT | Performed by: NURSE PRACTITIONER

## 2022-10-27 PROCEDURE — 3079F DIAST BP 80-89 MM HG: CPT | Performed by: NURSE PRACTITIONER

## 2022-10-27 PROCEDURE — 81025 URINE PREGNANCY TEST: CPT | Performed by: NURSE PRACTITIONER

## 2022-10-27 PROCEDURE — 3008F BODY MASS INDEX DOCD: CPT | Performed by: NURSE PRACTITIONER

## 2022-10-27 PROCEDURE — 87086 URINE CULTURE/COLONY COUNT: CPT | Performed by: NURSE PRACTITIONER

## 2022-10-27 NOTE — PATIENT INSTRUCTIONS
Push fluids  Follow up with your primary care doctor if symptoms persist  We will notify you of your urine culture results in the next 2-3 days  Follow up sooner for any new or worsening symptoms.

## 2023-03-21 ENCOUNTER — OFFICE VISIT (OUTPATIENT)
Dept: FAMILY MEDICINE CLINIC | Facility: CLINIC | Age: 38
End: 2023-03-21
Payer: COMMERCIAL

## 2023-03-21 VITALS
TEMPERATURE: 99 F | SYSTOLIC BLOOD PRESSURE: 122 MMHG | HEIGHT: 64 IN | RESPIRATION RATE: 16 BRPM | HEART RATE: 125 BPM | BODY MASS INDEX: 37.22 KG/M2 | OXYGEN SATURATION: 96 % | DIASTOLIC BLOOD PRESSURE: 74 MMHG | WEIGHT: 218 LBS

## 2023-03-21 DIAGNOSIS — J06.9 UPPER RESPIRATORY TRACT INFECTION, UNSPECIFIED TYPE: Primary | ICD-10-CM

## 2023-03-21 DIAGNOSIS — R05.1 ACUTE COUGH: ICD-10-CM

## 2023-03-21 PROCEDURE — 99213 OFFICE O/P EST LOW 20 MIN: CPT

## 2023-03-21 PROCEDURE — 3074F SYST BP LT 130 MM HG: CPT

## 2023-03-21 PROCEDURE — 3008F BODY MASS INDEX DOCD: CPT

## 2023-03-21 PROCEDURE — 3078F DIAST BP <80 MM HG: CPT

## 2023-03-21 PROCEDURE — 87637 SARSCOV2&INF A&B&RSV AMP PRB: CPT

## 2023-03-22 LAB
FLUAV + FLUBV RNA SPEC NAA+PROBE: NOT DETECTED
FLUAV + FLUBV RNA SPEC NAA+PROBE: NOT DETECTED
RSV RNA SPEC NAA+PROBE: NOT DETECTED
SARS-COV-2 RNA RESP QL NAA+PROBE: NOT DETECTED

## 2023-11-27 NOTE — TELEPHONE ENCOUNTER
Pt called to see if labs needed prior to 11/30 appt? I told her it didn't look like she needed labs but I would confirm and call her if I find out otherwise. Please advise. no

## 2024-03-07 ENCOUNTER — TELEPHONE (OUTPATIENT)
Dept: SURGERY | Facility: CLINIC | Age: 39
End: 2024-03-07

## 2024-05-02 ENCOUNTER — TELEPHONE (OUTPATIENT)
Dept: SURGERY | Facility: CLINIC | Age: 39
End: 2024-05-02

## 2024-05-16 ENCOUNTER — NURSE ONLY (OUTPATIENT)
Dept: SURGERY | Facility: CLINIC | Age: 39
End: 2024-05-16

## 2024-05-16 DIAGNOSIS — E61.1 IRON DEFICIENCY: Primary | ICD-10-CM

## 2024-05-16 PROCEDURE — 96372 THER/PROPH/DIAG INJ SC/IM: CPT | Performed by: INTERNAL MEDICINE

## 2024-05-16 RX ORDER — CYANOCOBALAMIN 1000 UG/ML
1000 INJECTION, SOLUTION INTRAMUSCULAR; SUBCUTANEOUS ONCE
Status: COMPLETED | OUTPATIENT
Start: 2024-05-16 | End: 2024-05-16

## 2024-05-16 RX ADMIN — CYANOCOBALAMIN 1000 MCG: 1000 INJECTION, SOLUTION INTRAMUSCULAR; SUBCUTANEOUS at 11:51:00

## 2024-06-03 ENCOUNTER — OFFICE VISIT (OUTPATIENT)
Dept: FAMILY MEDICINE CLINIC | Facility: CLINIC | Age: 39
End: 2024-06-03
Payer: COMMERCIAL

## 2024-06-03 VITALS
HEART RATE: 97 BPM | SYSTOLIC BLOOD PRESSURE: 130 MMHG | DIASTOLIC BLOOD PRESSURE: 88 MMHG | TEMPERATURE: 98 F | BODY MASS INDEX: 45.41 KG/M2 | OXYGEN SATURATION: 99 % | RESPIRATION RATE: 18 BRPM | WEIGHT: 266 LBS | HEIGHT: 64 IN

## 2024-06-03 DIAGNOSIS — O22.43: Primary | ICD-10-CM

## 2024-06-03 PROCEDURE — 3075F SYST BP GE 130 - 139MM HG: CPT | Performed by: NURSE PRACTITIONER

## 2024-06-03 PROCEDURE — 3079F DIAST BP 80-89 MM HG: CPT | Performed by: NURSE PRACTITIONER

## 2024-06-03 PROCEDURE — 3008F BODY MASS INDEX DOCD: CPT | Performed by: NURSE PRACTITIONER

## 2024-06-03 PROCEDURE — 99213 OFFICE O/P EST LOW 20 MIN: CPT | Performed by: NURSE PRACTITIONER

## 2024-06-04 NOTE — PROGRESS NOTES
CHIEF COMPLAINT:     Chief Complaint   Patient presents with    Hemorrhoids     OTC hemorrhoid creams used for 5 days.         HPI:   Iris Jones is a 38 year old female who presents with complaints of hemorrhoid pain.  Patient is 33 weeks pregnant.  Noticed hemorrhoids and pain the past 5 days.  Minimal rectal bleeding after having a BM.  Tried a generic OTC cream with no relief.      Current Outpatient Medications   Medication Sig Dispense Refill    Sertraline HCl 100 MG Oral Tab Take 1 tablet (100 mg total) by mouth daily.      Nitrofurantoin Monohyd Macro 100 MG Oral Cap TAKE 1 CAPSULE BY MOUTH AS NEEDED AFTER INTERCOURSE 30 capsule 1    Probiotic Product (PROBIOTIC DAILY OR) Take by mouth.      Calcium 500-125 MG-UNIT Oral Tab Take by mouth. Calcium 600-d3 20       Multiple Vitamins-Minerals (MULTIVITAMIN ADULT OR) Take by mouth 2 (two) times daily.      Biotin 78062 MCG Oral Tab Take by mouth.        Past Medical History:    Anxiety state    seeing psychologist-no meds    Depression    hx of years ago    H/O gastric bypass    High blood pressure    Intertrigo    Obesity, unspecified    Unspecified sleep apnea    AHI 5 c-pap    Visual impairment    lasik 6/17 - no glasses      Social History:  Social History     Socioeconomic History    Marital status:     Number of children: 0   Occupational History    Occupation: TEACHER     Employer: [employer] [address]     Comment: Social Studies, high school   Tobacco Use    Smoking status: Former     Current packs/day: 1.00     Average packs/day: 1 pack/day for 11.0 years (11.0 ttl pk-yrs)     Types: Cigarettes    Smokeless tobacco: Never   Vaping Use    Vaping status: Never Used   Substance and Sexual Activity    Alcohol use: Yes     Comment: social    Drug use: Yes     Types: Cannabis    Sexual activity: Yes     Partners: Male     Birth control/protection: OCP   Other Topics Concern    Caffeine Concern No    Exercise Yes     Comment: recently     Seat Belt Yes     Social Determinants of Health     Food Insecurity: Low Risk  (9/25/2023)    Received from [facility]    Food Insecurity     Have there been times that your food ran out, and you didn't have money to get more?: No     Are there times that you worry that this might happen?: No   Transportation Needs: Low Risk  (9/25/2023)    Received from [facility]    Transportation Needs     Do you have trouble getting transportation to medical appointments?: No     How do you normally get to and from your appointments?: Other        REVIEW OF SYSTEMS:   GENERAL: Denies fever, chills,weight change, decreased appetite  SKIN: Denies rashes, skin wounds or ulcers.  EYES: Denies blurred vision or double vision  HENT: Denies congestion, rhinorrhea, sore throat or ear pain  CHEST: Denies chest pain, or palpitations  LUNGS: Denies shortness of breath, cough, or wheezing  GI: Denies abdominal pain, N/V/C/D.   MUSCULOSKELETAL: no arthralgia or swollen joints  LYMPH:  Denies lymphadenopathy  NEURO: Denies headaches or lightheadedness      EXAM:   /88   Pulse 97   Temp 98.1 °F (36.7 °C) (Oral)   Resp 18   Ht 5' 4\" (1.626 m)   Wt 266 lb (120.7 kg)   SpO2 99%   BMI 45.66 kg/m²   GENERAL: well developed, well nourished,in no apparent distress  SKIN: no rashes,no suspicious lesions  LUNGS: clear to auscultation bilaterally, no wheezes or rhonchi. Breathing is non labored.  CARDIO: RRR without murmur  GI: large external hemorrhoid noted.  Not thrombosed.  No bleeding.  EXTREMITIES: no cyanosis, clubbing or edema    ASSESSMENT AND PLAN:     ASSESSMENT:  Encounter Diagnosis   Name Primary?    Hemorrhoids during pregnancy in third trimester (HCC) Yes       PLAN:  Try OTC Prep H pads with lidocaine.  Follow up in a few days with MFM as scheduled.  Self care per patient instructions.     Patient Instructions   Try over the counter Prep H pads with lidocaine  Follow up with MFM  as scheduled    The patient indicates understanding of these issues and agrees to the plan.

## 2024-06-17 ENCOUNTER — NURSE ONLY (OUTPATIENT)
Dept: SURGERY | Facility: CLINIC | Age: 39
End: 2024-06-17
Payer: COMMERCIAL

## 2024-06-17 DIAGNOSIS — E61.1 IRON DEFICIENCY: Primary | ICD-10-CM

## 2024-06-17 RX ORDER — CYANOCOBALAMIN 1000 UG/ML
1000 INJECTION, SOLUTION INTRAMUSCULAR; SUBCUTANEOUS ONCE
Status: COMPLETED | OUTPATIENT
Start: 2024-06-17 | End: 2024-06-17

## 2024-06-17 RX ADMIN — CYANOCOBALAMIN 1000 MCG: 1000 INJECTION, SOLUTION INTRAMUSCULAR; SUBCUTANEOUS at 14:09:00

## 2025-06-01 ENCOUNTER — HOSPITAL ENCOUNTER (EMERGENCY)
Age: 40
Discharge: HOME | End: 2025-06-01
Attending: EMERGENCY MEDICINE
Payer: COMMERCIAL

## 2025-06-01 VITALS
WEIGHT: 260 LBS | BODY MASS INDEX: 44.39 KG/M2 | HEIGHT: 64 IN | TEMPERATURE: 98 F | RESPIRATION RATE: 15 BRPM | HEART RATE: 81 BPM | OXYGEN SATURATION: 100 % | SYSTOLIC BLOOD PRESSURE: 156 MMHG | DIASTOLIC BLOOD PRESSURE: 85 MMHG

## 2025-06-01 DIAGNOSIS — H20.9 UVEITIS: Primary | ICD-10-CM

## 2025-06-01 PROCEDURE — 99284 EMERGENCY DEPT VISIT MOD MDM: CPT

## 2025-06-01 PROCEDURE — 99283 EMERGENCY DEPT VISIT LOW MDM: CPT

## 2025-06-01 RX ORDER — TOPIRAMATE 25 MG/1
25 TABLET, FILM COATED ORAL DAILY
COMMUNITY

## 2025-06-01 RX ORDER — BRIMONIDINE TARTRATE 2 MG/ML
1 SOLUTION/ DROPS OPHTHALMIC 2 TIMES DAILY
Qty: 5 ML | Refills: 0 | Status: SHIPPED | OUTPATIENT
Start: 2025-06-01

## 2025-06-01 RX ORDER — CYCLOPENTOLATE HYDROCHLORIDE 5 MG/ML
1 SOLUTION/ DROPS OPHTHALMIC ONCE
COMMUNITY

## 2025-06-01 RX ORDER — ACETAZOLAMIDE 250 MG/1
500 TABLET ORAL DAILY
Qty: 4 TABLET | Refills: 0 | Status: SHIPPED | OUTPATIENT
Start: 2025-06-01

## 2025-06-01 RX ORDER — DIFLUPREDNATE OPHTHALMIC 0.5 MG/ML
EMULSION OPHTHALMIC
COMMUNITY

## 2025-06-01 RX ORDER — DORZOLAMIDE HCL 20 MG/ML
1 SOLUTION/ DROPS OPHTHALMIC 2 TIMES DAILY
Qty: 10 ML | Refills: 0 | Status: SHIPPED | OUTPATIENT
Start: 2025-06-01

## 2025-06-01 RX ORDER — LOSARTAN POTASSIUM 25 MG/1
TABLET ORAL DAILY
COMMUNITY

## 2025-06-01 RX ORDER — TIMOLOL MALEATE 5 MG/ML
1 SOLUTION/ DROPS OPHTHALMIC ONCE
Status: COMPLETED | OUTPATIENT
Start: 2025-06-01 | End: 2025-06-01

## 2025-06-01 RX ORDER — PHENTERMINE HYDROCHLORIDE 30 MG/1
30 CAPSULE ORAL EVERY MORNING
COMMUNITY

## 2025-06-01 RX ORDER — HYDROCODONE BITARTRATE AND ACETAMINOPHEN 5; 325 MG/1; MG/1
2 TABLET ORAL ONCE
Refills: 0 | Status: COMPLETED | OUTPATIENT
Start: 2025-06-01 | End: 2025-06-01

## 2025-06-01 RX ORDER — TIMOLOL MALEATE 5 MG/ML
1 SOLUTION/ DROPS OPHTHALMIC 2 TIMES DAILY
Qty: 5 ML | Refills: 0 | Status: SHIPPED | OUTPATIENT
Start: 2025-06-01

## 2025-06-01 RX ORDER — HYDROCODONE BITARTRATE AND ACETAMINOPHEN 5; 325 MG/1; MG/1
2 TABLET ORAL ONCE
Refills: 0 | Status: DISCONTINUED | OUTPATIENT
Start: 2025-06-01 | End: 2025-06-01

## 2025-06-02 NOTE — ED QUICK NOTES
Called [facility] [phone]  Dr Valle is patients ophthalmologist.  Dr Juan M Blanco is the provider on call.   Will call back to speak with Dr Vergara

## 2025-06-02 NOTE — ED PROVIDER NOTES
Patient Seen in: [facility] [place]        History  Chief Complaint   Patient presents with    Eye Visual Problem     Stated Complaint: Pt to ER for possible flare-up of her uveitis. She started having pain and blur*    Subjective:   HPI       Patient is a 39-year-old woman with a history of uveitis followed by [facility] recently titrated down on her difluprednate who presents with increasing pain and decreased vision in the right eye.  This is similar to previous flareups of her uveitis.  Symptoms started on Friday.  Describes a 9 out of 10 pain in that eye.  Flashing and photosensitivity as well.  No other specific complaints.  Patient's visit from [address] [place] [address] [facility] was reviewed in care everywhere.      Objective:     Past Medical History:    Anxiety state    seeing psychologist-no meds    Depression    hx of years ago    H/O gastric bypass    High blood pressure    Intertrigo    Obesity, unspecified    Unspecified sleep apnea    AHI 5 c-pap    Visual impairment    lasik 6/17 - no glasses              Past Surgical History:   Procedure Laterality Date    Ankle fracture surgery  2008    Rt.     Gastric bypass,obese<100cm kathie-en-y  08/21/2017    Laser surgery of eye Bilateral 6/5/2017                Social History     Socioeconomic History    Marital status:     Number of children: 0   Occupational History    Occupation: TEACHER     Employer: [employer] [address]     Comment: Social Studies, high school   Tobacco Use    Smoking status: Former     Current packs/day: 1.00     Average packs/day: 1 pack/day for 11.0 years (11.0 ttl pk-yrs)     Types: Cigarettes    Smokeless tobacco: Never   Vaping Use    Vaping status: Never Used   Substance and Sexual Activity    Alcohol use: Yes     Comment: social    Drug use: Not Currently     Types: Cannabis    Sexual activity: Yes     Partners: Male     Birth control/protection: OCP   Other Topics Concern    Caffeine Concern No     Exercise Yes     Comment: recently    Seat Belt Yes     Social Drivers of Health     Food Insecurity: Low Risk  (7/8/2024)    Received from [facility]    Food Insecurity     Have there been times that your food ran out, and you didn't have money to get more?: No     Are there times that you worry that this might happen?: No   Transportation Needs: Low Risk  (7/8/2024)    Received from [facility]    Transportation Needs     Do you have trouble getting transportation to medical appointments?: No   Housing Stability: Low Risk  (7/8/2024)    Received from [facility]    Housing Stability     Are you worried that your electric, gas, oil, or water might be shut off?: No     Are you concerned about having a safe and reliable place to live?: No                                Physical Exam    ED Triage Vitals [06/01/25 2122]   /85   Pulse 81   Resp 15   Temp 97.8 °F (36.6 °C)   Temp src    SpO2 100 %   O2 Device None (Room air)       Current Vitals:   Vital Signs  BP: 156/85  Pulse: 81  Resp: 15  Temp: 97.8 °F (36.6 °C)    Oxygen Therapy  SpO2: 100 %  O2 Device: None (Room air)      Right Eye Chart Acuity: 20/400 (pt unable to see board), Uncorrected  Left Eye Chart Acuity: 20/20, Uncorrected      Physical Exam  General: Well-appearing patient of stated age resting comfortably  HEENT: Normocephalic atraumatic.  Nonicteric sclera.  Moist mucous membranes.  Photophobia particular in the right eye.  Consensual photophobia on the right when light shined in the left.  Right eye pupils sluggish.  Lungs: No tachypnea  Cardiac: No tachycardia  Skin: No rashes, pallor  Neuro: No focal deficits.  Extremities: No cyanosis/edema        ED Course  Labs Reviewed - No data to display       IOP of the right eye is 54.                  MDM     Patient is a 39-year-old female who presents to the emergency room with a history of uveitis.  Followed by [facility].  Is  on difluprednate and titrated down from 6 drops a day to 4 drops a day.  Patient had increasing pain and decreasing vision in that right eye since Friday.  Complains of photosensitivity, flashing, pain.  Decreased vision.  Visual acuity noted by nurses notes.  Deon-Pen was used and IOP obtained at 54.  Did call [facility] who called back though declined to give advice.  I discussed the case with ophthalmology on-call for my department Dr. Coker.  Said to start timolol, acetazolamide, dorzolamide, brimodine.  Timolol was given here and prescription for the other drops of medications was sent to the pharmacy to be picked up tonight.  Important for her to follow-up with follow-up with ophthalmology first thing in the morning.  This was discussed at length with patient.  If she cannot get into see [facility] go to the ER.        Medical Decision Making      Disposition and Plan     Clinical Impression:  1. Uveitis     Elevated IOP    Disposition:  There is no disposition on file for this visit.  There is no disposition time on file for this visit.    Follow-up:  your optho    Follow up in 1 day(s)            Medications Prescribed:  Current Discharge Medication List        START taking these medications    Details   timolol 0.5 % Ophthalmic Solution Place 1 drop into the right eye 2 (two) times daily.  Qty: 5 mL, Refills: 0      acetaZOLAMIDE 250 MG Oral Tab Take 2 tablets (500 mg total) by mouth daily.  Qty: 4 tablet, Refills: 0      dorzolamide 2 % Ophthalmic Solution Place 1 drop into the right eye in the morning and 1 drop before bedtime.  Qty: 10 mL, Refills: 0      brimonidine 0.2 % Ophthalmic Solution Place 1 drop into the right eye in the morning and 1 drop before bedtime.  Qty: 5 mL, Refills: 0                   Supplementary Documentation:

## 2025-06-02 NOTE — ED INITIAL ASSESSMENT (HPI)
Pt to ER for flashing and photosensitvity that started Friday, states similar to her past flare up of uveitis. 2017 lasik procedure.

## 2025-06-02 NOTE — DISCHARGE INSTRUCTIONS
Your IOP is high.  Start the medications immediately on picking them up tonight.  Follow-up with [facility] first thing in the morning.  If you cannot get into see [facility] should return to the ER.

## (undated) DEVICE — VIOLET BRAIDED (POLYGLACTIN 910), SYNTHETIC ABSORBABLE SUTURE: Brand: COATED VICRYL

## (undated) DEVICE — APPLICATOR COTTON TIP 6\" 2/PK

## (undated) DEVICE — TROCAR: Brand: KII FIOS FIRST ENTRY

## (undated) DEVICE — HOVERMATT 34IN SINGLE USE

## (undated) DEVICE — STERILE LATEX POWDER-FREE SURGICAL GLOVESWITH NITRILE COATING: Brand: PROTEXIS

## (undated) DEVICE — KIT STAPLER BARIATRIC BYPASS

## (undated) DEVICE — [HIGH FLOW INSUFFLATOR,  DO NOT USE IF PACKAGE IS DAMAGED,  KEEP DRY,  KEEP AWAY FROM SUNLIGHT,  PROTECT FROM HEAT AND RADIOACTIVE SOURCES.]: Brand: PNEUMOSURE

## (undated) DEVICE — DERMABOND LIQUID ADHESIVE

## (undated) DEVICE — SOL  .9 1000ML BAG

## (undated) DEVICE — ENDOSCOPY PORT CONNECTOR FOR OLYMPUS® SCOPES: Brand: ERBE

## (undated) DEVICE — SUTURE PDS II 1 CT-1

## (undated) DEVICE — UNDYED BRAIDED (POLYGLACTIN 910), SYNTHETIC ABSORBABLE SUTURE: Brand: COATED VICRYL

## (undated) DEVICE — DEVICE CLSR CRTR-THOMASON CLSR

## (undated) DEVICE — REM POLYHESIVE ADULT PATIENT RETURN ELECTRODE: Brand: VALLEYLAB

## (undated) DEVICE — SUTURING DEVICE: Brand: ENDO STITCH

## (undated) DEVICE — SYRINGE 10ML LL CONTRL SYRINGE

## (undated) DEVICE — ENDOSTITCH SURGIDAC 0

## (undated) DEVICE — PROXIMATE SKIN STAPLERS (35 WIDE) CONTAINS 35 STAINLESS STEEL STAPLES (FIXED HEAD): Brand: PROXIMATE

## (undated) DEVICE — DISSECTOR SONICISION CORDLESS

## (undated) DEVICE — HYBRID TUBING/CAP SET FOR OLYMPUS® SCOPES: Brand: ERBE

## (undated) DEVICE — KENDALL SCD EXPRESS SLEEVES, KNEE LENGTH, MEDIUM: Brand: KENDALL SCD

## (undated) DEVICE — NEEDLE HPO 18GA 1.5IN ECLPS

## (undated) DEVICE — TISSUE RETRIEVAL SYSTEM: Brand: INZII RETRIEVAL SYSTEM

## (undated) DEVICE — LAP CHOLE: Brand: MEDLINE INDUSTRIES, INC.

## (undated) DEVICE — SOL  .9 1000ML BTL

## (undated) DEVICE — 3M™ STERI-DRAPE™ INSTRUMENT POUCH 1018L: Brand: STERI-DRAPE™

## (undated) DEVICE — DRAPE SHEET LG

## (undated) NOTE — LETTER
[address] [address] [facility] [id number] [phone]     Patient: Irma Castro   YOB: 1985   Date of Visit: 11/18/2021     Dear Employer,        November 18, 2021    At [place] [employer]LL[employer] COVID-19 symptoms may discontinue isolation and other precautions 10 days after the date of their first positive RT-PCR test for SARS-CoV-2 RNA.     Persons who are asymptomatic but have been exposed, CDC recommends 14 days of quarantine after exposure base

## (undated) NOTE — MR AVS SNAPSHOT
[facility]  [address] [facility]  [place] [address]  [phone]               Thank you for choosing us for your health care visit with Dmitry Callahan PA-C. We are glad to serve you and happy to provide you with this summary of your visit.   Patricia to injury, arthritis, chronic fatigue syndrome, and fibromyalgia. A definite connection has not been shown, though.   Symptoms  · Pain in the face, jaw, or neck  · Pain with jaw movement or chewing  · Locking or catching sensation of the jaw  · Clicking, po take care of everything at once  · Helplessness: Feeling like your problems are more than you can solve  When possible, do something about your sources of stress.  See if you can avoid hassles, limit the amount of change in your life at one time, and take b © 9745-8816 [address], [address]. All rights reserved. This information is not intended as a substitute for professional medical care. Always follow your healthcare professional's instructions.              Al Take  by mouth. * Notice: This list has 2 medication(s) that are the same as other medications prescribed for you. Read the directions carefully, and ask your doctor or other care provider to review them with you.             Ana     Visit My

## (undated) NOTE — MR AVS SNAPSHOT
[address] [place]  [phone]           Thank you for choosing [address] for your health care visit with Damaris Zuniga PsyD.   We are glad to serve you and happy to provide you with this silver [facility] ([facility] [address])    [address], [address]   [phone]            Jul 03, 2017  9:00 AM   RD Follow Up PreOp with Tevin Sahu, [address], [address], [place]

## (undated) NOTE — MR AVS SNAPSHOT
After Visit Summary   10/31/2017    Caitlyn Evans    MRN: [id number]           Visit Information     Date & Time  10/31/2017  8:45 AM Provider  Abeba Vázquez MD [address] [place] [address], [address] [place], [place] [address].  P complete it and provide feedback. We strive to deliver the best patient experience and are looking for ways to make improvements. Your feedback will help us do so. For more information on [place], please visit [email or web address]

## (undated) NOTE — MR AVS SNAPSHOT
[address] [phone]               Thank you for choosing us for your health care visit with Letha Day MD.  We are glad to serve you and happy to provide you with this summary of Instructions and Information about Your Health     None      Allergies as of Jun 20, 2017     Pcn [Penicillins]     Sulfa Antibiotics                 Today's Vital Signs     BP Pulse Height Weight BMI    122/80 mmHg 84 64\" 312 lb 53.53 kg/m2         Avi

## (undated) NOTE — LETTER
Date: 3/21/2023    Patient Name: Aimee Crews          To Whom it may concern: This letter has been written at the patient's request. The above patient was seen at the [facility] for treatment of a medical condition. This patient should be excused from attending work from 2023 through 2023. The patient may return to work on 2023 after results of pending test are back.         Sincerely,        JAMES Kerr

## (undated) NOTE — MR AVS SNAPSHOT
After Visit Summary   8/21/2018    Coy Bruner    MRN: [id number]           Visit Information     Date & Time  8/21/2018  8:00 AM Provider  Yanelis Bethea MD [address] [place] [address], [address] [place], [place] [address].  Alexander [email or web address]                   DO YOU KNOW WHERE TO GO? Injury & illness are never convenient. If you are dealing with a   non-emergency, consider your options before heading to an ER.          SAME DAY  APPOINTMENTS  Availa

## (undated) NOTE — MR AVS SNAPSHOT
[facility] [address] [place]                Thank you for choosing us for your health care visit with Nathaly Peña RD.   We are glad to serve you and happy to provide you with this summary of your Medical Issues Discussed Today     Obesity, morbid, BMI 50 or higher      Instructions and Information about Your Health     None      Allergies as of Jun 07, 2017     Pcn [Penicillins]     Sulfa Antibiotics                 Today's Vital Signs     Height W

## (undated) NOTE — ED AVS SNAPSHOT
Cecelia Parker   MRN: [id number]    Department:  [facility]   Date of Visit:  8/28/2017           Disclosure     Insurance plans vary and the physician(s) referred by the ER may not be covered by your plan.  Please CARE PHYSICIAN AT ONCE OR RETURN IMMEDIATELY TO THE EMERGENCY DEPARTMENT. If you have been prescribed any medication(s), please fill your prescription right away and begin taking the medication(s) as directed.   If you believe that any of the medications

## (undated) NOTE — MR AVS SNAPSHOT
[facility] [address] [place] [address] [phone]               Thank you for choosing us for your health care visit with Blayne Hall MD.  We are glad to serve you and happy to provide you with this summary of you Take 10 mg by mouth 2 (two) times daily.    Commonly known as:  BELVIQ           Losartan Potassium 25 MG Tabs   TAKE ONE TABLET BY MOUTH ONCE DAILY   Commonly known as:  COZAAR           MetFORMIN HCl  MG Tb24   Take 3 tablets (2,250 mg total) by jeanie

## (undated) NOTE — LETTER
Patient Name: Kenia Licona  YOB: 1985          MRN number:  [id number]  Date:  7/20/2021  Referring Physician:  Herlinda Tuttle         SPINE EVALUATION:    Referring Physician: Dr. Joan Euceda  Diagnosis: Spasm of thoracolumbar muscl increased lordosis    Lumbar AROM: (* denotes performed with pain)  Flexion: 100%  Extension: 100%  Sidebending: R <100;% L >100*%  Rotation: R 50%*% L 65%    Hip ROM, degrees: (* denotes performed with pain)  AROM hip flex, standin R; 90* L  AROM hip safety   · Pt will improve lumbar spine AROM rot to >75 % to allow increase ease with bending to clean. · Pt will improve R hip flex PROM to 120 degrees in order to bend to the floor.   · Pt will have normal tone of thor/lumbar paraspinal muscles to tolera

## (undated) NOTE — LETTER
Patient Name: Sherri Marquez  YOB: 1985          MRN number:  [id number]  Date:  8/12/2021  Referring Physician:  Florencia Vo    Discharge Summary  Initial Functional Outcome Score 50/100  Final Functional Outcome Score 68/100

## (undated) NOTE — MR AVS SNAPSHOT
[facility] [address] [facility]  [place] [address]  [phone]               Thank you for choosing us for your health care visit with GLORIA Garcia. We are glad to serve you and happy to provide you with this summary of your visit.   Ple Your doctor may prescribe medications to help treat your sinusitis. If you have an infection, antibiotics can help clear it up. If you are prescribed antibiotics, take all pills on schedule until they are gone, even if you feel better.  Decongestants help r TAKE ONE TABLET BY MOUTH ONCE DAILY   Commonly known as:  COZAAR           MetFORMIN HCl  MG Tb24   Take 3 tablets (2,250 mg total) by mouth daily. Commonly known as:  GLUCOPHAGE-XR           Multiple Vitamin Tabs   Take 1 tablet by mouth daily. Summaries. If you've been to the Emergency Department or your doctor's office, you can view your past visit information in [employer] by going to Visits < Visit Summaries. [employer] questions? Call [phone] for help.   [employer] is NOT to be used for urge

## (undated) NOTE — LETTER
Date: 3/11/2020    Patient Name: Irma Castro          To Whom it may concern: This letter has been written at the patient's request. The above patient was seen at the [facility] for treatment of a medical condition.     This pa

## (undated) NOTE — ED AVS SNAPSHOT
Elza Burnette   MRN: [id number]    Department:  [address] [facility] in [place]   Date of Visit:  5/19/2018           Disclosure     Insurance plans vary and the physician(s) referred by the ER may not be covered by your plan.  Mary Ann tell this physician (or your personal doctor if your instructions are to return to your personal doctor) about any new or lasting problems. The primary care or specialist physician will see patients referred from the [facility] Emergency Department.  Efra Piper

## (undated) NOTE — Clinical Note
I saw Marie Coronado in the [email or web address] in [facility] today for BP check,  she was 124/66.  Thank you for the opportunity to care for [facility]

## (undated) NOTE — MR AVS SNAPSHOT
[facility] [address] [place] [address] [facility] [address] [phone]               Thank you for choosing us for your health care visit with Rio Glover MD.  We are glad to serve you and happy to provide you with this summary of you 311 lb 9.6 oz (141.3 kg)             Current Medications          Accurate as of 7/18/17  9:40 AM. Always use your most recent med list.               FIBER OR  Take by mouth. Losartan Potassium 25 MG Tabs  TAKE ONE TABLET BY MOUTH ONCE DAILY.  Needs

## (undated) NOTE — MR AVS SNAPSHOT
[facility] [address]                Thank you for choosing us for your health care visit with Carole Villavicencio MD.  We are glad to serve you and happy to provide you with this summary of you Reason for Today's Visit     Follow - Up     Obesity           Instructions and Information about Your Health     None      Allergies as of Jun 06, 2017     Pcn [Penicillins]     Sulfa Antibiotics                 Today's Vital Signs     BP Pulse Height Christos Dove

## (undated) NOTE — LETTER
Date: 9/5/2017    Patient Name: Belinda Da Silva          To Whom it may concern:     The above patient has been seen in our office and is ok to return to work 9/5/17        Sincerely,    Kriss Gonsalez PA-C

## (undated) NOTE — MR AVS SNAPSHOT
[facility] [employer] [place] [address] [facility] [phone]               Thank you for choosing us for your health care visit with Leodan Goodrich MD.  We are glad to serve you and happy to provide you with this summary of you Commonly known as:  [address] by mouth. RHOFADE 1 % Crea  Generic drug:  Oxymetazoline HCl  Apply topically. ROPINIRole HCl 0.25 MG Tabs  Take 0.25 mg by mouth 3 (three) times daily.   Commonly known as:  REQ

## (undated) NOTE — LETTER
Date: 4/12/2022    Patient Name: Ifrah Lewis          To Whom it may concern: This letter has been written at the patient's request. The above patient was seen at the [facility] for treatment of a medical condition. This patient should be excused from attending work/school from 4/12/22 through 4/14/22. The patient had a COVID test today which was negative.          Sincerely,    JAMES Bryan

## (undated) NOTE — MR AVS SNAPSHOT
[facility] [address] [place] [phone]               Thank you for choosing us for your health care visit with America Crews MD.  We are glad to serve you and happy to provide you with this summary of your vi Pcn [penicillins]     Sulfa Antibiotics                 Today's Vital Signs     BP   160/94    Pulse   71    Temp   97.8 °F (36.6 °C) (Oral)    Height   64\"    Weight   306 lb 12.8 oz (139.2 kg)            Current Medications          Accurate as of 8/30 Healthy Diet and Regular Exercise  [employer] [address] [place], [place] [address] for making healthy food choices  -   Enjoy your food, but eat less. Fully enjoy your food when eating. Don’t eat while distracted and slow down. Avoid over sized portions.    Don’

## (undated) NOTE — LETTER
06/11/18        [address] [facility] [address]      Dear Michael Garland,    [address] [facility] records indicate that you have outstanding lab work and or testing that was ordered for you and has not yet been completed:        HIV AG

## (undated) NOTE — MR AVS SNAPSHOT
[address] [phone]               Thank you for choosing us for your health care visit with Burke Kate MD.  We are glad to serve you and happy to provide you with this summary of [address], [address] [facility] [address] [phone]              Follow-up Instructions     Return in about 4 weeks (around 6/20/2017).          Reason for Today's Visit     Weight Check           Medical Issues Discussed Today     [email or web address] [email or web address]. If you've recently had a stay at the Hospital you can access your discharge instructions in [employer] by going to Visits < Admission Summaries.  If you've been to the Emergency Department or your doctor's office, you can view yo

## (undated) NOTE — Clinical Note
FYI, TCM call made, see notes. NCM scheduled TCM HFU appointment with Shannon Pang, per pt's request, on 8/28/17.

## (undated) NOTE — MR AVS SNAPSHOT
After Visit Summary   7/30/2019    Hiren Leyva    MRN: [id number]           Visit Information     Date & Time  7/30/2019  9:30 AM Provider  Ann Trujillo MD [address] [place] [address], [facility]  P conditions such as allergies, colds, cough, fever, rash, sore throat, headache and pink eye. The cost for a Video Visit is currently $35.         If you receive a survey from [employer], please take a few minutes to complete it and provide feedback.  We s For more information about hours, locations or appointment options available at [facility],  visit: [email or web address] or call [phone]. ([phone])

## (undated) NOTE — LETTER
Date & Time: 1/10/2022, 9:40 AM  Patient: Breann 79  Encounter Provider(s):    Joann Elaine MD  See, Negar Govea PA-C       To Whom It May Concern:    Vimal Johnson was seen and treated in our department on 1/10/2022.  She should not r

## (undated) NOTE — Clinical Note
Hi, Dr. Adelaide Martinez! Thank you for referring Ms. Cecelia Parker for rheumatologic evaluation. Please see the discussion portion of my note and let me know if you have any questions.  [facility]6/26/2020

## (undated) NOTE — Clinical Note
Dear Dr Eduarda Britton,  Thank you for referring your patient to [facility]. We value our relationship with you and appreciate your confidence in our service and staff.    It is with great pleasure that we were able to provide treatment to [facility]

## (undated) NOTE — MR AVS SNAPSHOT
[address] [phone]               Thank you for choosing us for your health care visit with Nette Anderson MD.  We are glad to serve you and happy to provide you with this summary of authorization numbers or be assured that none are required. You can then schedule your appointment. Failure to obtain required authorization numbers can create reimbursement difficulties for you.       Assoc Dx:  Obesity, morbid, BMI 50 or higher ([facility] [B06 Commonly known as:  RETIN-A           TRI-SPRINTEC 0.18/0.215/0.25 MG-35 MCG Tabs   Generic drug:  Norgestim-Eth Estrad Triphasic           Vitamin D 2000 units Caps   Take  by mouth. * Notice:   This list has 2 medication(s) that are the same as

## (undated) NOTE — MR AVS SNAPSHOT
[facility] [employer] [place] [id number] [phone]               Thank you for choosing us for your health care visit with Mahi Sparrow MD.  We are glad to serve you and happy to provide you with this summary of you TRI-SPRINTEC 0.18/0.215/0.25 MG-35 MCG Tabs  Generic drug:  Norgestim-Eth Estrad Triphasic                 MyChart    Visit MyChart  You can access your MyChart to more actively manage your health care and view more details from this visit by going to http